# Patient Record
Sex: FEMALE | Race: WHITE | NOT HISPANIC OR LATINO | Employment: FULL TIME | URBAN - METROPOLITAN AREA
[De-identification: names, ages, dates, MRNs, and addresses within clinical notes are randomized per-mention and may not be internally consistent; named-entity substitution may affect disease eponyms.]

---

## 2017-08-01 ENCOUNTER — APPOINTMENT (OUTPATIENT)
Dept: LAB | Facility: MEDICAL CENTER | Age: 32
End: 2017-08-01
Payer: COMMERCIAL

## 2017-08-01 ENCOUNTER — LAB CONVERSION - ENCOUNTER (OUTPATIENT)
Dept: OTHER | Facility: OTHER | Age: 32
End: 2017-08-01

## 2017-08-01 ENCOUNTER — TRANSCRIBE ORDERS (OUTPATIENT)
Dept: ADMINISTRATIVE | Facility: HOSPITAL | Age: 32
End: 2017-08-01

## 2017-08-01 DIAGNOSIS — Z00.8 HEALTH EXAMINATION IN POPULATION SURVEYS: Primary | ICD-10-CM

## 2017-08-01 LAB
CHOLEST SERPL-MCNC: 170 MG/DL (ref 50–200)
EST. AVERAGE GLUCOSE BLD GHB EST-MCNC: 128 MG/DL
HBA1C MFR BLD: 6.1 % (ref 4.2–6.3)
HDLC SERPL-MCNC: 64 MG/DL (ref 40–60)
LDLC SERPL CALC-MCNC: 90 MG/DL (ref 0–100)
TRIGL SERPL-MCNC: 79 MG/DL

## 2017-08-01 PROCEDURE — 80061 LIPID PANEL: CPT | Performed by: PREVENTIVE MEDICINE

## 2017-08-01 PROCEDURE — 83036 HEMOGLOBIN GLYCOSYLATED A1C: CPT | Performed by: PREVENTIVE MEDICINE

## 2017-08-01 PROCEDURE — 36415 COLL VENOUS BLD VENIPUNCTURE: CPT | Performed by: PREVENTIVE MEDICINE

## 2017-11-06 ENCOUNTER — ALLSCRIPTS OFFICE VISIT (OUTPATIENT)
Dept: OTHER | Facility: OTHER | Age: 32
End: 2017-11-06

## 2017-11-06 ENCOUNTER — LAB REQUISITION (OUTPATIENT)
Dept: LAB | Facility: HOSPITAL | Age: 32
End: 2017-11-06
Payer: COMMERCIAL

## 2017-11-06 DIAGNOSIS — Z01.419 ENCOUNTER FOR GYNECOLOGICAL EXAMINATION WITHOUT ABNORMAL FINDING: ICD-10-CM

## 2017-11-06 DIAGNOSIS — Z11.51 ENCOUNTER FOR SCREENING FOR HUMAN PAPILLOMAVIRUS (HPV): ICD-10-CM

## 2017-11-06 PROCEDURE — 87624 HPV HI-RISK TYP POOLED RSLT: CPT | Performed by: PHYSICIAN ASSISTANT

## 2017-11-06 PROCEDURE — G0145 SCR C/V CYTO,THINLAYER,RESCR: HCPCS | Performed by: PHYSICIAN ASSISTANT

## 2017-11-07 NOTE — PROGRESS NOTES
Assessment  1  Encounter for gynecological examination without abnormal finding (V72 31) (Z01 419)   2  Encounter for birth control pills maintenance (V25 41) (Z30 41)    Plan  Encounter for gynecological examination without abnormal finding    · Tri-Sprintec 0 18/0 215/0 25 MG-35 MCG Oral Tablet; TAKE ONE TABLET EVERY  DAY   Rx By: Libby Dee; Dispense: 84 Days ; #:84 Tablet; Refill: 3;For: Encounter for gynecological examination without abnormal finding; LASHELL = N; Verified Transmission to [address]; Last Updated By: System, [employer]; 11/6/2017 9:07:50 AM  Encounter for gynecological examination without abnormal finding, Screening for HPV  (human papillomavirus)    · (1) THIN PREP PAP WITH IMAGING; Status: In Progress - Specimen/Data  Collected,Retrospective By Protocol Authorization;   Done: [id number]   Perform:[facility] In Office Collection; DGR:[id number]; Last Updated Norma Caceres; 11/6/2017 8:59:10 AM;Ordered;For:Encounter for gynecological examination without abnormal finding, Screening for HPV (human papillomavirus); Ordered By:Susy Botello;  Maturation index required? : No  : 11/2/17  HPV? : Regardless of Interpretation    Discussion/Summary  healthy adult female cervical cancer screening is needed every three years HPV and Pap Co-testing Done Today Testing was done today for declines  Annual exam and pap performed, will call if abnormalrx sent via EMR1 year for annual       Chief Complaint  Pt here for yearly doing well today  History of Present Illness  HPI: 33 y/o female here for annual GYN exam  Pt denies any changes in medical or surgical histories  MGM- dx and passed away from kidney cancer  Menses regular, pt on trisprintec, no problems  (+) sexually active with 1 partner  Pt engaged  complaints       GYN HM, Adult Female [facility] Leandra: The patient is being seen for a Annual Gyn Exam evaluation  The last health maintenance visit was 1 year(s) ago     General Health: The patient's health since the last visit is described as good  Lifestyle:  She does not have any weight concerns  -- She does not exercise regularly  -- She does not use tobacco  The patient is a former cigarette smoker  -- She consumes alcohol  She reports occasional alcohol use  -- She denies drug use  She has never used illicit drugs  Reproductive health: the patient is premenopausal--   she reports no menstrual problems  Menstrual history: LMP: the last menstrual period was 11/2/17  Recent menstrual periods: bleeding has been normal  The cycles have been regular  The duration of her recent periods has been regular  -- she uses contraception  For contraception, she uses oral contraception pills  -- she is sexually active  She is monogamous with a male partner  -- she denies prior pregnancies G     Screening: Cervical cancer screening includes a pap smear performed 10/22/14 Neg,-- human papilloma virus screening performed 10/22/14 Neg-- and-- no previous colposcopy  Breast cancer screening includes no previous mammogram  Colorectal cancer screening includes a colonoscopy performed 2 yrs  Review of Systems    Constitutional: no fever-- and-- no chills  Cardiovascular: no chest pain  Respiratory: no shortness of breath  Breasts: no breast pain-- and-- no breast lump  Gastrointestinal: no abdominal pain  Genitourinary: dysmenorrhea, but-- no dysuria,-- no pelvic pain,-- no vaginal discharge-- and-- no unexplained vaginal bleeding  Neurological: no headache  Active Problems  1  Birth Control Method - Oral Contraceptives   2  Cyst of left ovary (620 2) (N83 202)   3  Encounter for birth control pills maintenance (V25 41) (Z30 41)   4  Encounter for gynecological examination without abnormal finding (V72 31) (Z01 419)   5   Encounter for routine gynecological examination (V72 31) (Z01 419)    Past Medical History   · History of migraine (V12 49) (Z86 69)    The active problems and past medical history were reviewed and updated today  Surgical History   · Denied: History Of Prior Surgery    The surgical history was reviewed and updated today  Family History  Father    · No pertinent family history  Family History    · Family history of Denial Of Any Significant Medical History    The family history was reviewed and updated today  Social History   · Being A Social Drinker   · Birth Control Method - Oral Contraceptives   · Current Every Day Smoker (305 1)   · Current Smoker (305 1)   · Daily Coffee Consumption (1  Cups/Day)   · Exercising Erratically  The social history was reviewed and updated today  The social history was reviewed and is unchanged  Current Meds   1  Tri-Sprintec 0 18/0 215/0 25 MG-35 MCG Oral Tablet; TAKE 1 TABLET DAILY AS   DIRECTED; Therapy: [id number] to [phone]  Requested for: 48OHC5608; Last   Rx:03Nov2016 Ordered    Allergies  1  No Known Drug Allergies    Vitals   Recorded: 08MRD7073 74:98ZZ   Systolic 890, RUE, Sitting   Diastolic 82, RUE, Sitting   Height 5 ft 2 in   Weight 243 lb    BMI Calculated 44 45   BSA Calculated 2 08   LMP [id number]     Physical Exam    Constitutional   General appearance: No acute distress, well appearing and well nourished  Neck   Thyroid: Normal, no thyromegaly  Pulmonary   Respiratory effort: No increased work of breathing or signs of respiratory distress  Auscultation of lungs: Clear to auscultation  Cardiovascular   Auscultation of heart: Normal rate and rhythm, normal S1 and S2, no murmurs  Genitourinary   External genitalia: Normal and no lesions appreciated  Vagina: Normal, no lesions or dryness appreciated  Urethra: Normal     Urethral meatus: Normal     Bladder: Normal, soft, non-tender and no prolapse or masses appreciated  Cervix: Normal, no palpable masses  A Pap smear was performed  Uterus: Normal, non-tender, not enlarged, and no palpable masses      Adnexa/parametria: Normal, non-tender and no fullness or masses appreciated  Chest   Breasts: Normal and no dimpling or skin changes noted  Abdomen   Abdomen: Normal, non-tender, and no organomegaly noted      Psychiatric   Orientation to person, place, and time: Normal     Mood and affect: Normal        Signatures   Electronically signed by : Maxene Prader, PAC; Nov 6 2017  9:13AM EST                       (Author)    Electronically signed by : BETHANIE Guadalupe ; Nov 6 2017 11:42AM EST

## 2017-11-09 LAB — HPV RRNA GENITAL QL NAA+PROBE: NORMAL

## 2017-11-13 LAB
LAB AP GYN PRIMARY INTERPRETATION: NORMAL
Lab: NORMAL

## 2018-01-12 VITALS
WEIGHT: 243 LBS | SYSTOLIC BLOOD PRESSURE: 132 MMHG | HEIGHT: 62 IN | BODY MASS INDEX: 44.72 KG/M2 | DIASTOLIC BLOOD PRESSURE: 82 MMHG

## 2018-01-17 NOTE — MISCELLANEOUS
Message   Recorded as Task   Date: 10/27/2017 12:57 PM, Created By: Everett Torrez   Task Name: Med Renewal Request   Assigned To: Yahaira Fierro   Regarding Patient: Zafar Weaver, Status: Active   CommentAbdullahi Lay - 27 Oct 2017 12:57 PM     TASK CREATED  Caller: Self; [phone] (Home); [phone] (Work)  pt requested bc refill to [facility], 1 pck called in        Active Problems    1  Birth Control Method - Oral Contraceptives   2  Cyst of left ovary (620 2) (N83 202)   3  Encounter for birth control pills maintenance (V25 41) (Z30 41)   4  Encounter for gynecological examination without abnormal finding (V72 31) (Z01 419)   5  Encounter for routine gynecological examination (V72 31) (Z01 419)   6  Screening for HPV (human papillomavirus) (V73 81) (Z11 51)    Current Meds   1  Tri-Sprintec 0 18/0 215/0 25 MG-35 MCG Oral Tablet; take 1 tablet by mouth every day; Therapy: 05Slo3450 to (Evaluate:29Quv1063)  Requested for: 42LCX8699; Last   Rx:26Oct2015 Ordered   2  Tri-Sprintec 0 18/0 215/0 25 MG-35 MCG Oral Tablet; TAKE 1 TABLET DAILY AS   DIRECTED; Therapy: 09UIM1596 to [phone])  Requested for: 47GIQ2136; Last   Rx:65Jwh4007 Ordered    Allergies    1   No Known Drug Allergies    Signatures   Electronically signed by : Ani Carrillo, ; Oct 27 2017 12:57PM EST                       (Author)

## 2018-08-25 ENCOUNTER — TRANSCRIBE ORDERS (OUTPATIENT)
Dept: LAB | Facility: HOSPITAL | Age: 33
End: 2018-08-25

## 2018-08-25 ENCOUNTER — APPOINTMENT (OUTPATIENT)
Dept: LAB | Facility: HOSPITAL | Age: 33
End: 2018-08-25

## 2018-08-25 DIAGNOSIS — Z00.8 HEALTH EXAMINATION IN POPULATION SURVEY: Primary | ICD-10-CM

## 2018-08-25 DIAGNOSIS — Z00.8 HEALTH EXAMINATION IN POPULATION SURVEY: ICD-10-CM

## 2018-08-25 LAB
CHOLEST SERPL-MCNC: 172 MG/DL (ref 50–200)
EST. AVERAGE GLUCOSE BLD GHB EST-MCNC: 134 MG/DL
HBA1C MFR BLD: 6.3 % (ref 4.2–6.3)
HDLC SERPL-MCNC: 39 MG/DL (ref 40–60)
LDLC SERPL CALC-MCNC: 104 MG/DL (ref 0–100)
NONHDLC SERPL-MCNC: 133 MG/DL
TRIGL SERPL-MCNC: 144 MG/DL

## 2018-08-25 PROCEDURE — 80061 LIPID PANEL: CPT

## 2018-08-25 PROCEDURE — 83036 HEMOGLOBIN GLYCOSYLATED A1C: CPT

## 2018-08-25 PROCEDURE — 36415 COLL VENOUS BLD VENIPUNCTURE: CPT

## 2018-10-05 DIAGNOSIS — Z30.41 ORAL CONTRACEPTIVE PILL SURVEILLANCE: Primary | ICD-10-CM

## 2018-10-05 NOTE — TELEPHONE ENCOUNTER
pts yearly was resched by us from 11/12 to 11/26 ,  pls send 1 refill of bc trisprintec to [facility], thanks

## 2018-10-08 RX ORDER — NORGESTIMATE AND ETHINYL ESTRADIOL 7DAYSX3 28
1 KIT ORAL DAILY
Qty: 90 TABLET | Refills: 0 | Status: SHIPPED | OUTPATIENT
Start: 2018-10-08 | End: 2018-11-26 | Stop reason: SDUPTHER

## 2018-11-26 ENCOUNTER — ANNUAL EXAM (OUTPATIENT)
Dept: OBGYN CLINIC | Facility: MEDICAL CENTER | Age: 33
End: 2018-11-26
Payer: COMMERCIAL

## 2018-11-26 VITALS — DIASTOLIC BLOOD PRESSURE: 74 MMHG | SYSTOLIC BLOOD PRESSURE: 110 MMHG | WEIGHT: 190.8 LBS | BODY MASS INDEX: 34.9 KG/M2

## 2018-11-26 DIAGNOSIS — Z30.41 ORAL CONTRACEPTIVE PILL SURVEILLANCE: ICD-10-CM

## 2018-11-26 DIAGNOSIS — Z01.419 ENCOUNTER FOR GYNECOLOGICAL EXAMINATION (GENERAL) (ROUTINE) WITHOUT ABNORMAL FINDINGS: Primary | ICD-10-CM

## 2018-11-26 PROCEDURE — 99395 PREV VISIT EST AGE 18-39: CPT | Performed by: PHYSICIAN ASSISTANT

## 2018-11-26 RX ORDER — NORGESTIMATE AND ETHINYL ESTRADIOL 7DAYSX3 28
1 KIT ORAL DAILY
Qty: 84 TABLET | Refills: 3 | Status: SHIPPED | OUTPATIENT
Start: 2018-11-26 | End: 2019-12-03 | Stop reason: SDUPTHER

## 2018-11-26 NOTE — ASSESSMENT & PLAN NOTE
Annual exam performed, pt declined STD testing  OCP rx sent via EMR, rec taking placebo and seeing if headache improves, otherwise rec switching to monophasic and taking continuous OCP if withdrawal insertion  RTO 1 year

## 2018-11-26 NOTE — PROGRESS NOTES
Assessment/Plan  Problem List Items Addressed This Visit     Encounter for gynecological examination (general) (routine) without abnormal findings - Primary     Annual exam performed, pt declined STD testing  OCP rx sent via EMR, rec taking placebo and seeing if headache improves, otherwise rec switching to monophasic and taking continuous OCP if withdrawal insertion  RTO 1 year           Other Visit Diagnoses     Oral contraceptive pill surveillance        Relevant Medications    norgestimate-ethinyl estradiol (TRI-SPRINTEC) 0 18/0 215/0 25 MG-35 MCG per tablet        Bethany Argueta is a 28 y o  female who presents for annual GYN exam  She denies any changes in Medical, Surgical or Family histories  Periods are regular every 28-30 days, pt on trisprintec, gets headache in placebo week before menses  Dysmenorrhea:mild, occurring first 1-2 days of flow  She denies intermenstrual bleeding, spotting, or discharge  She reports that she is sexually active with 1 partner and using OCP (estrogen/progesterone) for contraception  Last Pap smear: 2017  Regular self breast exam: yes    Family history of uterine or ovarian cancer: no  Family history of breast cancer: no  Family history of colon cancer: no    Menstrual History:  OB History      Para Term  AB Living    0 0 0 0 0 0    SAB TAB Ectopic Multiple Live Births    0 0 0 0 0         Patient's last menstrual period was 2018 (exact date)    Period Pattern: Regular  Menstrual Flow: Moderate    The following portions of the patient's history were reviewed and updated as appropriate: allergies, current medications, past family history, past medical history, past social history, past surgical history and problem list     Review of Systems  Review of Systems     Objective   /74 (BP Location: Right arm)   Wt 86 5 kg (190 lb 12 8 oz)   LMP 2018 (Exact Date)   BMI 34 90 kg/m²     Physical Exam   Constitutional: She is oriented to person, place, and time  She appears well-developed and well-nourished  Neck: No thyromegaly present  Cardiovascular: Normal rate and regular rhythm  Pulmonary/Chest: Effort normal and breath sounds normal  Right breast exhibits no mass, no nipple discharge, no skin change and no tenderness  Left breast exhibits no mass, no nipple discharge, no skin change and no tenderness  Abdominal: Soft  There is no tenderness  There is no rebound and no guarding  Genitourinary: There is no rash or lesion on the right labia  There is no rash or lesion on the left labia  Uterus is not enlarged and not tender  Cervix exhibits no motion tenderness and no discharge  Right adnexum displays no mass and no tenderness  Left adnexum displays no mass and no tenderness  No bleeding in the vagina  No vaginal discharge found  Neurological: She is alert and oriented to person, place, and time  Psychiatric: She has a normal mood and affect  Nursing note and vitals reviewed

## 2019-10-19 ENCOUNTER — OFFICE VISIT (OUTPATIENT)
Dept: URGENT CARE | Facility: MEDICAL CENTER | Age: 34
End: 2019-10-19
Payer: COMMERCIAL

## 2019-10-19 VITALS
BODY MASS INDEX: 29.52 KG/M2 | HEIGHT: 62 IN | SYSTOLIC BLOOD PRESSURE: 110 MMHG | RESPIRATION RATE: 18 BRPM | DIASTOLIC BLOOD PRESSURE: 78 MMHG | HEART RATE: 94 BPM | OXYGEN SATURATION: 100 % | WEIGHT: 160.4 LBS | TEMPERATURE: 98.4 F

## 2019-10-19 DIAGNOSIS — J01.00 ACUTE MAXILLARY SINUSITIS, RECURRENCE NOT SPECIFIED: Primary | ICD-10-CM

## 2019-10-19 PROCEDURE — S9088 SERVICES PROVIDED IN URGENT: HCPCS | Performed by: PHYSICIAN ASSISTANT

## 2019-10-19 PROCEDURE — 99213 OFFICE O/P EST LOW 20 MIN: CPT | Performed by: PHYSICIAN ASSISTANT

## 2019-10-19 RX ORDER — AMOXICILLIN 500 MG/1
500 TABLET, FILM COATED ORAL 2 TIMES DAILY
Qty: 14 TABLET | Refills: 0 | Status: SHIPPED | OUTPATIENT
Start: 2019-10-19 | End: 2019-10-26

## 2019-10-19 NOTE — LETTER
October 19, 2019     Patient: Joanna Catalan   YOB: 1985   Date of Visit: 10/19/2019       To Whom it May Concern:    Joanna Catalan was seen in my clinic on 10/19/2019  If you have any questions or concerns, please don't hesitate to call           Sincerely,          Klarissa Little PA-C        CC: Joanna Marquesnatividad

## 2019-10-19 NOTE — PROGRESS NOTES
[facility]        NAME: Glenna Brown is a 27 y o  female  : 1985    MRN: [id number]  DATE: 2019  TIME: 11:16 AM    Assessment and Plan   Acute maxillary sinusitis, recurrence not specified [J01 00]  1  Acute maxillary sinusitis, recurrence not specified  amoxicillin (AMOXIL) 500 MG tablet         Patient Instructions       May continue OTC medications  Use antibiotic as directed   follow-up with PCP if symptoms do not improve    Chief Complaint     Chief Complaint   Patient presents with    Facial Pain     Pt  C/O sinus pressure and congerstion for the past two days  Reports low grade fevers  History of Present Illness        Patient is a 26-year-old female who presents today with complaints of sinus pressure, sinus congestion, sore throat, cough for the past 4 days  She reports low-grade fever of 99  She has used OTC Tylenol Sinus without any relief  Review of Systems   Review of Systems   Constitutional: Negative for fever  HENT: Positive for congestion, ear pain, sinus pressure, sinus pain and sore throat  Eyes: Negative for redness  Respiratory: Positive for cough  Negative for shortness of breath  Cardiovascular: Negative for chest pain  Current Medications       Current Outpatient Medications:     norgestimate-ethinyl estradiol (TRI-SPRINTEC) 0 18/0 215/0 25 MG-35 MCG per tablet, Take 1 tablet by mouth daily, Disp: 84 tablet, Rfl: 3    amoxicillin (AMOXIL) 500 MG tablet, Take 1 tablet (500 mg total) by mouth 2 (two) times a day for 7 days, Disp: 14 tablet, Rfl: 0    Current Allergies     Allergies as of 10/19/2019    (No Known Allergies)            The following portions of the patient's history were reviewed and updated as appropriate: allergies, current medications, past family history, past medical history, past social history, past surgical history and problem list      History reviewed  No pertinent past medical history      Past Surgical History:   Procedure Laterality Date    NO PAST SURGERIES         Family History   Problem Relation Age of Onset    No Known Problems Father     No Known Problems Family          Medications have been verified  Objective   /78   Pulse 94   Temp 98 4 °F (36 9 °C) (Temporal)   Resp 18   Ht 5' 2" (1 575 m)   Wt 72 8 kg (160 lb 6 4 oz)   SpO2 100%   BMI 29 34 kg/m²        Physical Exam     Physical Exam   Constitutional: She appears well-developed and well-nourished  HENT:   Head: Normocephalic and atraumatic  Right Ear: Tympanic membrane and ear canal normal    Left Ear: Tympanic membrane and ear canal normal    Nose: Mucosal edema present  Right sinus exhibits maxillary sinus tenderness  Left sinus exhibits maxillary sinus tenderness  Mouth/Throat: Uvula is midline and mucous membranes are normal  Posterior oropharyngeal erythema present  No oropharyngeal exudate or posterior oropharyngeal edema  Eyes: Pupils are equal, round, and reactive to light  Conjunctivae are normal    Neck: Neck supple  Cardiovascular: Normal rate and regular rhythm  Pulmonary/Chest: Effort normal and breath sounds normal    Lymphadenopathy:     She has no cervical adenopathy  Skin: Skin is warm and dry

## 2019-10-19 NOTE — PATIENT INSTRUCTIONS
May continue OTC medications  Use antibiotic as directed   follow-up with PCP if symptoms do not improve    Sinusitis   WHAT YOU NEED TO KNOW:   Sinusitis is inflammation or infection of your sinuses  It is most often caused by a virus  Acute sinusitis may last up to 12 weeks  Chronic sinusitis lasts longer than 12 weeks  Recurrent sinusitis means you have 4 or more times in 1 year  DISCHARGE INSTRUCTIONS:   Return to the emergency department if:   · Your eye and eyelid are red, swollen, and painful  · You cannot open your eye  · You have vision changes, such as double vision  · Your eyeball bulges out or you cannot move your eye  · You are more sleepy than normal, or you notice changes in your ability to think, move, or talk  · You have a stiff neck, a fever, or a bad headache  · You have swelling of your forehead or scalp  Contact your healthcare provider if:   · Your symptoms do not improve after 3 days  · Your symptoms do not go away after 10 days  · You have nausea and are vomiting  · Your nose is bleeding  · You have questions or concerns about your condition or care  Medicines: Your symptoms may go away on their own  Your healthcare provider may recommend watchful waiting for up to 10 days before starting antibiotics  You may  need any of the following:  · Acetaminophen  decreases pain and fever  It is available without a doctor's order  Ask how much to take and how often to take it  Follow directions  Read the labels of all other medicines you are using to see if they also contain acetaminophen, or ask your doctor or pharmacist  Acetaminophen can cause liver damage if not taken correctly  Do not use more than 4 grams (4,000 milligrams) total of acetaminophen in one day  · NSAIDs , such as ibuprofen, help decrease swelling, pain, and fever  This medicine is available with or without a doctor's order   NSAIDs can cause stomach bleeding or kidney problems in certain people  If you take blood thinner medicine, always ask your healthcare provider if NSAIDs are safe for you  Always read the medicine label and follow directions  · Nasal steroid sprays  may help decrease inflammation in your nose and sinuses  · Decongestants  help reduce swelling and drain mucus in the nose and sinuses  They may help you breathe easier  · Antihistamines  help dry mucus in the nose and relieve sneezing  · Antibiotics  help treat or prevent a bacterial infection  · Take your medicine as directed  Contact your healthcare provider if you think your medicine is not helping or if you have side effects  Tell him or her if you are allergic to any medicine  Keep a list of the medicines, vitamins, and herbs you take  Include the amounts, and when and why you take them  Bring the list or the pill bottles to follow-up visits  Carry your medicine list with you in case of an emergency  Self-care:   · Rinse your sinuses  Use a sinus rinse device to rinse your nasal passages with a saline (salt water) solution or distilled water  Do not use tap water  This will help thin the mucus in your nose and rinse away pollen and dirt  It will also help reduce swelling so you can breathe normally  Ask your healthcare provider how often to do this  · Breathe in steam   Heat a bowl of water until you see steam  Lean over the bowl and make a tent over your head with a large towel  Breathe deeply for about 20 minutes  Be careful not to get too close to the steam or burn yourself  Do this 3 times a day  You can also breathe deeply when you take a hot shower  · Sleep with your head elevated  Place an extra pillow under your head before you go to sleep to help your sinuses drain  · Drink liquids as directed  Ask your healthcare provider how much liquid to drink each day and which liquids are best for you  Liquids will thin the mucus in your nose and help it drain   Avoid drinks that contain alcohol or caffeine  · Do not smoke, and avoid secondhand smoke  Nicotine and other chemicals in cigarettes and cigars can make your symptoms worse  Ask your healthcare provider for information if you currently smoke and need help to quit  E-cigarettes or smokeless tobacco still contain nicotine  Talk to your healthcare provider before you use these products  Prevent the spread of germs that cause sinusitis:  Wash your hands often with soap and water  Wash your hands after you use the bathroom, change a child's diaper, or sneeze  Wash your hands before you prepare or eat food  Follow up with your healthcare provider as directed: You may be referred to an ear, nose, and throat specialist  Write down your questions so you remember to ask them during your visits  © 2017 [facility] Information is for End User's use only and may not be sold, redistributed or otherwise used for commercial purposes  All illustrations and images included in CareNotes® are the copyrighted property of A [employer]  or [employer]  The above information is an  only  It is not intended as medical advice for individual conditions or treatments  Talk to your doctor, nurse or pharmacist before following any medical regimen to see if it is safe and effective for you

## 2019-12-03 ENCOUNTER — ANNUAL EXAM (OUTPATIENT)
Dept: OBGYN CLINIC | Facility: MEDICAL CENTER | Age: 34
End: 2019-12-03
Payer: COMMERCIAL

## 2019-12-03 VITALS
DIASTOLIC BLOOD PRESSURE: 72 MMHG | SYSTOLIC BLOOD PRESSURE: 110 MMHG | HEIGHT: 62 IN | BODY MASS INDEX: 30.03 KG/M2 | WEIGHT: 163.2 LBS

## 2019-12-03 DIAGNOSIS — Z30.41 ORAL CONTRACEPTIVE PILL SURVEILLANCE: ICD-10-CM

## 2019-12-03 DIAGNOSIS — Z01.419 ENCOUNTER FOR GYNECOLOGICAL EXAMINATION (GENERAL) (ROUTINE) WITHOUT ABNORMAL FINDINGS: Primary | ICD-10-CM

## 2019-12-03 DIAGNOSIS — Z30.41 ENCOUNTER FOR SURVEILLANCE OF CONTRACEPTIVE PILLS: ICD-10-CM

## 2019-12-03 PROCEDURE — 99395 PREV VISIT EST AGE 18-39: CPT | Performed by: NURSE PRACTITIONER

## 2019-12-03 RX ORDER — NORGESTIMATE AND ETHINYL ESTRADIOL 7DAYSX3 28
1 KIT ORAL DAILY
Qty: 84 TABLET | Refills: 3 | Status: SHIPPED | OUTPATIENT
Start: 2019-12-03 | End: 2020-11-17 | Stop reason: SDUPTHER

## 2019-12-03 NOTE — PATIENT INSTRUCTIONS
Birth Control Pills   WHAT YOU NEED TO KNOW:   What are birth control pills? Birth control pills are also called oral contraceptives, or the pill  It is medicine that helps prevent pregnancy  Birth control pills work by preventing ovulation  Ovulation is when the ovaries make and release an egg cell each month  If this egg gets fertilized by sperm, pregnancy occurs  Birth control pills may also help to prevent pregnancy by keeping sperm from fertilizing an egg  What may be done before I can start taking birth control pills? You need to see your healthcare provider to get a prescription  Any of the following may be done before your healthcare provider gives you a prescription:  · Your healthcare provider will ask you about diseases and illnesses you have had in the past  He will check your risk for blood clots, heart conditions, or stroke  He will also check your blood pressure, and may do a breast and pelvic exam  A Pap smear may also be done during the pelvic exam  This is a test to make sure you do not have abnormal changes on your cervix  You may need other tests, such as a urine test, to make sure you are not pregnant  · Your healthcare provider will ask if you take any medicines and if you smoke  Smoking increases your risk for stroke, heart attack, or a blood clot in your lungs  If you smoke, you should not take certain kinds of birth control pills  What are the advantages of birth control pills? When birth control pills are used correctly, the chances of getting pregnant are very low  Birth control pills may help decrease bleeding and pain during your monthly period  They may also help prevent cancer of the uterus and ovaries  What are the disadvantages of birth control pills? You may have sudden changes in your mood or feelings while you take birth control pills  You may have nausea and decreased sex drive  You may have an increased appetite and rapid weight gain   You may also have bleeding in between periods, less frequent periods, vaginal dryness, and breast pain  Birth control pills will not protect you from sexually transmitted infections  Rarely, some birth control pills can increase your risk for a blood clot  This may become life-threatening  What should I do if I decide I want to get pregnant? If you are planning to have a baby, ask your healthcare provider when you may stop taking your birth control pills  It may take some time for you to start ovulating again  Ask your healthcare provider for more information about pregnancy after birth control pills  When should I start taking birth control pills after I have a baby? If you are not breastfeeding, you may start taking birth control pills 3 weeks after you give birth  You may be able to take certain types of birth control pills if you are breastfeeding  These pills can be started from 6 weeks to 6 months after you give birth  Ask your healthcare provider for more information about when to start taking birth control pills after you give birth  When should I contact my healthcare provider? · You have forgotten to take a birth control pill  · You have mood changes, such as depression, since starting birth control pills  · You have nausea or you are vomiting  · You have severe abdominal pain  · You missed a period and have questions or concerns about being pregnant  · You still have bleeding 4 months after taking birth control pills correctly  · You have questions or concerns about your condition or care  When should I seek immediate care or call 911? · Your arm or leg feels warm, tender, and painful  It may look swollen and red  · You feel lightheaded, short of breath, and have chest pain  · You cough up blood      · You have any of the following signs of a stroke:      ¨ Numbness or drooping on one side of your face     ¨ Weakness in an arm or leg    ¨ Confusion or difficulty speaking    ¨ Dizziness, a severe headache, or vision loss    · You have severe pain, numbness, or swelling in your arms or legs  CARE AGREEMENT:   You have the right to help plan your care  Learn about your health condition and how it may be treated  Discuss treatment options with your caregivers to decide what care you want to receive  You always have the right to refuse treatment  The above information is an  only  It is not intended as medical advice for individual conditions or treatments  Talk to your doctor, nurse or pharmacist before following any medical regimen to see if it is safe and effective for you  © 2017 [facility] Information is for End User's use only and may not be sold, redistributed or otherwise used for commercial purposes  All illustrations and images included in CareNotes® are the copyrighted property of A D A M , Inc  or Ted Bauer

## 2019-12-03 NOTE — ASSESSMENT & PLAN NOTE
Normal findings on routine annual gyn exam  Recommended monthly SBE, annual CBE  Reviewed ASCCP guidelines and pap noted to be up to date  STI testing was offered and declined at this time; the patient is aware that condoms are recommended for all sexual contact for prevention of STI  The patient likes OCP for contraception and desires to continue  Reviewed diet/activity recommendations and reasons to call  F/u in one year for routine annual gyn exam or sooner PRN

## 2019-12-03 NOTE — ASSESSMENT & PLAN NOTE
The patient likes current OCP  She denies ACHES and desires to continue  She is aware condoms are advised with all sexual contact for prevention of STI  Refill provided

## 2019-12-03 NOTE — PROGRESS NOTES
Assessment/Plan:    Encounter for gynecological examination (general) (routine) without abnormal findings  Normal findings on routine annual gyn exam  Recommended monthly SBE, annual CBE  Reviewed ASCCP guidelines and pap noted to be up to date  STI testing was offered and declined at this time; the patient is aware that condoms are recommended for all sexual contact for prevention of STI  The patient likes OCP for contraception and desires to continue  Reviewed diet/activity recommendations and reasons to call  F/u in one year for routine annual gyn exam or sooner PRN  Encounter for surveillance of contraceptive pills  The patient likes current OCP  She denies ACHES and desires to continue  She is aware condoms are advised with all sexual contact for prevention of STI  Refill provided  Diagnoses and all orders for this visit:    Encounter for gynecological examination (general) (routine) without abnormal findings    Encounter for surveillance of contraceptive pills    Oral contraceptive pill surveillance  -     norgestimate-ethinyl estradiol (TRI-SPRINTEC) 0 18/0 215/0 25 MG-35 MCG per tablet; Take 1 tablet by mouth daily      Levi Musa  1985      CC:  Yearly exam     S: Barbie Vasquez is a  27 y o  female here for yearly exam  She denies breast concerns, abdominal/pelvic pain, abnormal vaginal discharge or bladder/bowel dysfunction  Her cycles are regular, not heavy or painful  Sexual activity: She is sexually active without pain, bleeding or dryness  She had migraines on inactive OCP in past but states they are much improved when she starting taking placebos  Contraception: She uses RICHAR  for contraception  STD testing:  She does not want STD testing today  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        Last Pap/HPV 11/17 neg/neg  SBE monthly     Family hx of breast cancer: denies   Family hx of ovarian cancer: denies  Family hx of colon cancer: denies       Current Outpatient Medications:     norgestimate-ethinyl estradiol (TRI-SPRINTEC) 0 18/0 215/0 25 MG-35 MCG per tablet, Take 1 tablet by mouth daily, Disp: 84 tablet, Rfl: 3  Social History     Socioeconomic History    Marital status:      Spouse name: Not on file    Number of children: Not on file    Years of education: Not on file    Highest education level: Not on file   Occupational History    Not on file   Social Needs    Financial resource strain: Not on file    Food insecurity:     Worry: Not on file     Inability: Not on file    Transportation needs:     Medical: Not on file     Non-medical: Not on file   Tobacco Use    Smoking status: Former Smoker     Last attempt to quit: 2017     Years since quittin 9    Smokeless tobacco: Never Used   Substance and Sexual Activity    Alcohol use: Yes     Comment: rarely    Drug use: No    Sexual activity: Yes     Partners: Male     Birth control/protection: Pill   Lifestyle    Physical activity:     Days per week: Not on file     Minutes per session: Not on file    Stress: Not on file   Relationships    Social connections:     Talks on phone: Not on file     Gets together: Not on file     Attends [employer] service: Not on file     Active member of club or organization: Not on file     Attends meetings of clubs or organizations: Not on file     Relationship status: Not on file    Intimate partner violence:     Fear of current or ex partner: Not on file     Emotionally abused: Not on file     Physically abused: Not on file     Forced sexual activity: Not on file   Other Topics Concern    Not on file   Social History Narrative    Daily coffee consumption (1cup/day)    Exercises erratically      Family History   Problem Relation Age of Onset    Hypertension Father     Hyperlipidemia Father     Diabetes Father     No Known Problems Family     Hypertension Mother     Asthma Mother     Asthma Sister     Hypertension Sister     Hypertension Maternal Grandmother     Kidney cancer Maternal Grandmother     Prostate cancer Maternal Grandfather     Diabetes Paternal Grandmother     No Known Problems Paternal Grandfather       History reviewed  No pertinent past medical history  Review of Systems   Constitutional: Negative for appetite change, fatigue and unexpected weight change  Respiratory: Negative for shortness of breath  Cardiovascular: Negative for chest pain and leg swelling  Gastrointestinal: Negative for abdominal pain  Endocrine: Negative for cold intolerance and heat intolerance  Breasts:  Negative for tenderness, pain or masses  Genitourinary: Negative  Negative for dyspareunia, dysuria, flank pain, frequency, genital sores, hematuria, menstrual problem and pelvic pain  Musculoskeletal: Negative for back pain  Neurological: Negative for headaches  O:  Blood pressure 110/72, height 5' 2 25" (1 581 m), weight 74 kg (163 lb 3 2 oz), last menstrual period 11/20/2019  Patient appears well and is not in distress  Neck is supple without masses  Normal thyroid  Heart regular rate and rhythm  Lungs CTA bilaterally   Breasts are symmetrical without mass, tenderness, nipple discharge, skin changes or adenopathy  Fibrocystic changes noted   Abdomen is soft and nontender without masses  External genitals are normal without lesions or rashes  Urethral meatus and urethra are normal  Bladder is normal to palpation  Vagina is normal without discharge or bleeding  Cervix is normal without discharge or lesion  Uterus is normal, mobile, nontender without palpable mass  Adnexa are normal, nontender, without palpable mass

## 2020-11-17 DIAGNOSIS — Z30.41 ORAL CONTRACEPTIVE PILL SURVEILLANCE: ICD-10-CM

## 2020-11-18 ENCOUNTER — TELEPHONE (OUTPATIENT)
Dept: OBGYN CLINIC | Facility: CLINIC | Age: 35
End: 2020-11-18

## 2020-11-18 RX ORDER — NORGESTIMATE AND ETHINYL ESTRADIOL 7DAYSX3 28
1 KIT ORAL DAILY
Qty: 28 TABLET | Refills: 0 | Status: SHIPPED | OUTPATIENT
Start: 2020-11-18 | End: 2020-12-15 | Stop reason: SDUPTHER

## 2020-12-15 DIAGNOSIS — Z30.41 ORAL CONTRACEPTIVE PILL SURVEILLANCE: ICD-10-CM

## 2020-12-16 RX ORDER — NORGESTIMATE AND ETHINYL ESTRADIOL 7DAYSX3 28
1 KIT ORAL DAILY
Qty: 28 TABLET | Refills: 0 | Status: SHIPPED | OUTPATIENT
Start: 2020-12-16 | End: 2021-01-05 | Stop reason: SDUPTHER

## 2020-12-16 RX ORDER — NORGESTIMATE AND ETHINYL ESTRADIOL 7DAYSX3 28
KIT ORAL
Qty: 28 TABLET | Refills: 0 | OUTPATIENT
Start: 2020-12-16

## 2021-01-05 ENCOUNTER — TELEPHONE (OUTPATIENT)
Dept: OBGYN CLINIC | Facility: MEDICAL CENTER | Age: 36
End: 2021-01-05

## 2021-01-05 DIAGNOSIS — Z30.41 ORAL CONTRACEPTIVE PILL SURVEILLANCE: ICD-10-CM

## 2021-01-05 RX ORDER — NORGESTIMATE AND ETHINYL ESTRADIOL 7DAYSX3 28
1 KIT ORAL DAILY
Qty: 90 TABLET | Refills: 0 | Status: SHIPPED | OUTPATIENT
Start: 2021-01-05 | End: 2021-02-23 | Stop reason: SDUPTHER

## 2021-01-06 ENCOUNTER — OFFICE VISIT (OUTPATIENT)
Dept: URGENT CARE | Facility: MEDICAL CENTER | Age: 36
End: 2021-01-06
Payer: COMMERCIAL

## 2021-01-06 VITALS — HEART RATE: 123 BPM | TEMPERATURE: 97.8 F | OXYGEN SATURATION: 98 %

## 2021-01-06 DIAGNOSIS — R09.89 SYMPTOMS OF UPPER RESPIRATORY INFECTION (URI): Primary | ICD-10-CM

## 2021-01-06 PROCEDURE — U0003 INFECTIOUS AGENT DETECTION BY NUCLEIC ACID (DNA OR RNA); SEVERE ACUTE RESPIRATORY SYNDROME CORONAVIRUS 2 (SARS-COV-2) (CORONAVIRUS DISEASE [COVID-19]), AMPLIFIED PROBE TECHNIQUE, MAKING USE OF HIGH THROUGHPUT TECHNOLOGIES AS DESCRIBED BY CMS-2020-01-R: HCPCS | Performed by: PHYSICIAN ASSISTANT

## 2021-01-06 PROCEDURE — G0382 LEV 3 HOSP TYPE B ED VISIT: HCPCS | Performed by: PHYSICIAN ASSISTANT

## 2021-01-06 NOTE — LETTER
January 6, 2021     Patient: Trevon Terrell   YOB: 1985   Date of Visit: 1/6/2021       To Whom it May Concern:    Trevon Terrell was seen in my clinic on 1/6/2021  Please excuse from work until test results available  If you have any questions or concerns, please don't hesitate to call           Sincerely,          Kelley Arvizu PA-C        CC: Trevon Terrell

## 2021-01-06 NOTE — PROGRESS NOTES
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  Advised symptomatic treatment and to isolate until results available  Patient Instructions   Tylenol or Motrin for pain  May use OTC cough or congestion medications  Isolate until results available  Follow up with PCP in 3-5 days  Proceed to  ER if symptoms worsen  Chief Complaint     Chief Complaint   Patient presents with    Cold Like Symptoms     Started yesterday cough, coughing up green mucus and runny nose  Has been taking aireborne cold ez and robatussin  History of Present Illness       Patient is a 29-year-old female who comes in today with complaints of sore throat, cough, runny nose starting yesterday  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  She denies any shortness of breath or wheezing  No fevers, chills, body aches  Review of Systems   Review of Systems   Constitutional: Negative for chills, fatigue and fever  HENT: Positive for congestion, rhinorrhea and sore throat  Negative for ear pain  Respiratory: Positive for cough  Negative for shortness of breath and wheezing  Cardiovascular: Negative for chest pain  Musculoskeletal: Negative for myalgias           Current Medications       Current Outpatient Medications:     norgestimate-ethinyl estradiol (Tri-Sprintec) 0 18/0 215/0 25 MG-35 MCG per tablet, Take 1 tablet by mouth daily, Disp: 90 tablet, Rfl: 0    Current Allergies     Allergies as of 2021    (No Known Allergies)            The following portions of the patient's history were reviewed and updated as appropriate: allergies, current medications, past family history, past medical history, past social history, past surgical history and problem list      History reviewed  No pertinent past medical history  Past Surgical History:   Procedure Laterality Date    NO PAST SURGERIES         Family History   Problem Relation Age of Onset    Hypertension Father     Hyperlipidemia Father     Diabetes Father     No Known Problems Family     Hypertension Mother     Asthma Mother     Asthma Sister     Hypertension Sister     Hypertension Maternal Grandmother     Kidney cancer Maternal Grandmother     Prostate cancer Maternal Grandfather     Diabetes Paternal Grandmother     No Known Problems Paternal Grandfather          Medications have been verified  Objective   Pulse (!) 123   Temp 97 8 °F (36 6 °C)   SpO2 98%        Physical Exam     Physical Exam  Constitutional:       General: She is not in acute distress  Appearance: Normal appearance  She is not ill-appearing  HENT:      Head: Normocephalic and atraumatic  Right Ear: Tympanic membrane and ear canal normal       Left Ear: Tympanic membrane and ear canal normal       Nose: Rhinorrhea present  Mouth/Throat:      Mouth: Mucous membranes are moist       Pharynx: Oropharynx is clear  No posterior oropharyngeal erythema  Eyes:      Conjunctiva/sclera: Conjunctivae normal       Pupils: Pupils are equal, round, and reactive to light  Neck:      Musculoskeletal: Neck supple  Cardiovascular:      Rate and Rhythm: Regular rhythm  Tachycardia present  Pulmonary:      Effort: Pulmonary effort is normal       Breath sounds: Normal breath sounds  Lymphadenopathy:      Cervical: No cervical adenopathy  Skin:     General: Skin is warm and dry  Neurological:      Mental Status: She is alert

## 2021-01-07 LAB — SARS-COV-2 RNA SPEC QL NAA+PROBE: NOT DETECTED

## 2021-01-29 ENCOUNTER — IMMUNIZATIONS (OUTPATIENT)
Dept: FAMILY MEDICINE CLINIC | Facility: HOSPITAL | Age: 36
End: 2021-01-29

## 2021-01-29 DIAGNOSIS — Z23 ENCOUNTER FOR IMMUNIZATION: Primary | ICD-10-CM

## 2021-01-29 PROCEDURE — 0001A SARS-COV-2 / COVID-19 MRNA VACCINE (PFIZER-BIONTECH) 30 MCG: CPT

## 2021-01-29 PROCEDURE — 91300 SARS-COV-2 / COVID-19 MRNA VACCINE (PFIZER-BIONTECH) 30 MCG: CPT

## 2021-02-20 ENCOUNTER — IMMUNIZATIONS (OUTPATIENT)
Dept: FAMILY MEDICINE CLINIC | Facility: HOSPITAL | Age: 36
End: 2021-02-20

## 2021-02-20 DIAGNOSIS — Z23 ENCOUNTER FOR IMMUNIZATION: Primary | ICD-10-CM

## 2021-02-20 PROCEDURE — 91300 SARS-COV-2 / COVID-19 MRNA VACCINE (PFIZER-BIONTECH) 30 MCG: CPT

## 2021-02-20 PROCEDURE — 0002A SARS-COV-2 / COVID-19 MRNA VACCINE (PFIZER-BIONTECH) 30 MCG: CPT

## 2021-02-23 ENCOUNTER — ANNUAL EXAM (OUTPATIENT)
Dept: OBGYN CLINIC | Facility: CLINIC | Age: 36
End: 2021-02-23
Payer: COMMERCIAL

## 2021-02-23 VITALS — WEIGHT: 203.2 LBS | DIASTOLIC BLOOD PRESSURE: 70 MMHG | BODY MASS INDEX: 36.87 KG/M2 | SYSTOLIC BLOOD PRESSURE: 114 MMHG

## 2021-02-23 DIAGNOSIS — Z01.419 ENCOUNTER FOR GYNECOLOGICAL EXAMINATION (GENERAL) (ROUTINE) WITHOUT ABNORMAL FINDINGS: Primary | ICD-10-CM

## 2021-02-23 DIAGNOSIS — Z30.41 ORAL CONTRACEPTIVE PILL SURVEILLANCE: ICD-10-CM

## 2021-02-23 DIAGNOSIS — Z30.41 ENCOUNTER FOR SURVEILLANCE OF CONTRACEPTIVE PILLS: ICD-10-CM

## 2021-02-23 DIAGNOSIS — Z11.51 SCREENING FOR HPV (HUMAN PAPILLOMAVIRUS): ICD-10-CM

## 2021-02-23 PROCEDURE — 87624 HPV HI-RISK TYP POOLED RSLT: CPT | Performed by: NURSE PRACTITIONER

## 2021-02-23 PROCEDURE — G0145 SCR C/V CYTO,THINLAYER,RESCR: HCPCS | Performed by: NURSE PRACTITIONER

## 2021-02-23 PROCEDURE — 99395 PREV VISIT EST AGE 18-39: CPT | Performed by: NURSE PRACTITIONER

## 2021-02-23 RX ORDER — NORGESTIMATE AND ETHINYL ESTRADIOL 7DAYSX3 28
1 KIT ORAL DAILY
Qty: 90 TABLET | Refills: 3 | Status: SHIPPED | OUTPATIENT
Start: 2021-02-23 | End: 2022-05-16 | Stop reason: ALTCHOICE

## 2021-02-23 NOTE — ASSESSMENT & PLAN NOTE
Normal findings on routine gyn exam  Advised monthly SBE, annual CBE and baseline screening mammo at age 35  Reviewed ASCCP guidelines and recommended pap with cotesting q3 yrs for this low risk patient-collected today  STI testing was offered and the patient declines; she reports low risk  The patient desires to continue current contraceptive method-OCP  Diet/activity recommendations:  Encouraged daily Ca++ and vitamin D intake as well as daily weight bearing exercise for promotion of bone health  RTO in one year or sooner PRN

## 2021-02-23 NOTE — PROGRESS NOTES
Encounter for gynecological examination (general) (routine) without abnormal findings    Normal findings on routine gyn exam  Advised monthly SBE, annual CBE and baseline screening mammo at age 35  Reviewed ASCCP guidelines and recommended pap with cotesting q3 yrs for this low risk patient-collected today  STI testing was offered and the patient declines; she reports low risk  The patient desires to continue current contraceptive method-OCP  Diet/activity recommendations:  Encouraged daily Ca++ and vitamin D intake as well as daily weight bearing exercise for promotion of bone health  RTO in one year or sooner PRN  Encounter for surveillance of contraceptive pills    Happy with current OCP  Denies ACHES, breakthrough bleeding, nausea or other side effects  Refill given for one year  RTO 1 year or prn problems or concerns  Diagnoses and all orders for this visit:    Encounter for gynecological examination (general) (routine) without abnormal findings  -     Liquid-based pap, screening    Encounter for surveillance of contraceptive pills    Screening for HPV (human papillomavirus)  -     Liquid-based pap, screening    Oral contraceptive pill surveillance  -     norgestimate-ethinyl estradiol (Tri-Sprintec) 0 18/0 215/0 25 MG-35 MCG per tablet; Take 1 tablet by mouth daily         Jennifer Cyr  1985      CC:  Yearly exam     S:  Jennifer Cyr is a 38 y o  female here for yearly exam  She denies breast concerns, abdominal/pelvic pain, abnormal vaginal discharge or bladder/bowel dysfunction  Her cycles are regular, not heavy or painful on OCP    Sexual activity: She is rarely sexually active without pain, bleeding or dryness  Contraception: She uses OCP  for contraception  STD testing:  She does not want STD testing today         Last Pap 11/17 neg/neg   SBE sometimes       Family hx of breast cancer: denies  Family hx of ovarian cancer:denies  Family hx of colon cancer: denies    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       Current Outpatient Medications:     norgestimate-ethinyl estradiol (Tri-Sprintec) 0 18/0 215/0 25 MG-35 MCG per tablet, Take 1 tablet by mouth daily, Disp: 90 tablet, Rfl: 3  Social History     Socioeconomic History    Marital status:      Spouse name: Not on file    Number of children: Not on file    Years of education: Not on file    Highest education level: Not on file   Occupational History    Not on file   Social Needs    Financial resource strain: Not on file    Food insecurity     Worry: Not on file     Inability: Not on file    Transportation needs     Medical: Not on file     Non-medical: Not on file   Tobacco Use    Smoking status: Former Smoker     Quit date:      Years since quittin 1    Smokeless tobacco: Never Used   Substance and Sexual Activity    Alcohol use: Yes     Comment: rarely    Drug use: No    Sexual activity: Yes     Partners: Male     Birth control/protection: Pill   Lifestyle    Physical activity     Days per week: Not on file     Minutes per session: Not on file    Stress: Not on file   Relationships    Social connections     Talks on phone: Not on file     Gets together: Not on file     Attends [employer] service: Not on file     Active member of club or organization: Not on file     Attends meetings of clubs or organizations: Not on file     Relationship status: Not on file    Intimate partner violence     Fear of current or ex partner: Not on file     Emotionally abused: Not on file     Physically abused: Not on file     Forced sexual activity: Not on file   Other Topics Concern    Not on file   Social History Narrative    Daily coffee consumption (1cup/day)    Exercises erratically      Family History   Problem Relation Age of Onset    Hypertension Father     Hyperlipidemia Father     Diabetes Father     No Known Problems Family     Hypertension Mother     Asthma Mother     Asthma Sister    [facility] Hypertension Sister     Hypertension Maternal Grandmother     Kidney cancer Maternal Grandmother     Prostate cancer Maternal Grandfather     Diabetes Paternal Grandmother     No Known Problems Paternal Grandfather       History reviewed  No pertinent past medical history  Review of Systems   Constitutional: Negative for appetite change, fatigue and unexpected weight change  Respiratory: Negative for shortness of breath  Cardiovascular: Negative for chest pain and leg swelling  Gastrointestinal: Negative for abdominal pain  Endocrine: Negative for cold intolerance and heat intolerance  Breasts:  Negative for tenderness, pain or masses  Genitourinary: Negative for dyspareunia, dysuria, flank pain, frequency, genital sores, hematuria, menstrual problem and pelvic pain  Musculoskeletal: Negative for back pain  Neurological: Negative for headaches  O:  Blood pressure 114/70, weight 92 2 kg (203 lb 3 2 oz)  Patient appears well and is not in distress  ROM normal   Neck is supple without masses  Normal thyroid  Heart regular rate and rhythm  Lungs CTA bilaterally   Breasts are symmetrical without mass, tenderness, nipple discharge, skin changes or adenopathy  Fibrocystic changes noted bilaterally   Abdomen is soft and nontender without masses  External genitals are normal without lesions or rashes  Urethral meatus and urethra are normal  Bladder is normal to palpation  Vagina is normal without discharge or bleeding  Cervix is normal without discharge or lesion  + friability with pap  Uterus is normal, mobile, nontender without palpable mass  Adnexa are normal, nontender, without palpable mass     Skin warm and dry   Capillary refill < 2 seconds  Alert and oriented x 3 with normal affect

## 2021-02-26 LAB
HPV HR 12 DNA CVX QL NAA+PROBE: NEGATIVE
HPV16 DNA CVX QL NAA+PROBE: NEGATIVE
HPV18 DNA CVX QL NAA+PROBE: NEGATIVE
LAB AP GYN PRIMARY INTERPRETATION: NORMAL
Lab: NORMAL

## 2021-03-01 ENCOUNTER — TELEPHONE (OUTPATIENT)
Dept: OBGYN CLINIC | Facility: MEDICAL CENTER | Age: 36
End: 2021-03-01

## 2021-08-31 ENCOUNTER — APPOINTMENT (OUTPATIENT)
Dept: LAB | Facility: MEDICAL CENTER | Age: 36
End: 2021-08-31

## 2021-08-31 DIAGNOSIS — Z00.8 HEALTH EXAMINATION IN POPULATION SURVEY: ICD-10-CM

## 2021-08-31 LAB
CHOLEST SERPL-MCNC: 203 MG/DL (ref 50–200)
EST. AVERAGE GLUCOSE BLD GHB EST-MCNC: 117 MG/DL
HBA1C MFR BLD: 5.7 %
HDLC SERPL-MCNC: 42 MG/DL
LDLC SERPL CALC-MCNC: 134 MG/DL (ref 0–100)
NONHDLC SERPL-MCNC: 161 MG/DL
TRIGL SERPL-MCNC: 134 MG/DL

## 2021-08-31 PROCEDURE — 36415 COLL VENOUS BLD VENIPUNCTURE: CPT

## 2021-08-31 PROCEDURE — 83036 HEMOGLOBIN GLYCOSYLATED A1C: CPT

## 2021-08-31 PROCEDURE — 80061 LIPID PANEL: CPT

## 2022-05-16 ENCOUNTER — ANNUAL EXAM (OUTPATIENT)
Dept: OBGYN CLINIC | Facility: MEDICAL CENTER | Age: 37
End: 2022-05-16
Payer: COMMERCIAL

## 2022-05-16 VITALS
WEIGHT: 235.6 LBS | BODY MASS INDEX: 44.48 KG/M2 | DIASTOLIC BLOOD PRESSURE: 82 MMHG | HEIGHT: 61 IN | SYSTOLIC BLOOD PRESSURE: 140 MMHG

## 2022-05-16 DIAGNOSIS — Z01.419 ENCNTR FOR GYN EXAM (GENERAL) (ROUTINE) W/O ABN FINDINGS: Primary | ICD-10-CM

## 2022-05-16 PROBLEM — Z30.41 ENCOUNTER FOR SURVEILLANCE OF CONTRACEPTIVE PILLS: Status: RESOLVED | Noted: 2019-12-03 | Resolved: 2022-05-16

## 2022-05-16 PROCEDURE — S0612 ANNUAL GYNECOLOGICAL EXAMINA: HCPCS | Performed by: PHYSICIAN ASSISTANT

## 2022-05-16 RX ORDER — FLUTICASONE PROPIONATE 50 MCG
1 SPRAY, SUSPENSION (ML) NASAL DAILY
COMMUNITY

## 2022-05-16 NOTE — PROGRESS NOTES
Angel Holes  1985      CC:  Yearly exam    S:  39 y o  female here for yearly exam  Her cycles are regular, not heavy or crampy  Sexual activity: She is sexually active without pain, bleeding or dryness  Contraception: She uses nothing for contraception  She stopped her birth control pills in 2021  She got  this past month  They are trying for a pregnancy  They have not prevented pregnancy since prior to the wedding but were infrequently active at that time because they were busy planning the wedding  We reviewed the risks of AMA including aneuploidy, gHTN/preE, GDM  We discussed optimizing her health at this time including daily exercise, weight loss  She works on a med/surg/trauma floor at [employer]  Last Pap 21 neg/neg    We reviewed [facility] guidelines for Pap testing today       Family hx of breast cancer: no  Family hx of ovarian cancer: no  Family hx of colon cancer: no      Current Outpatient Medications:     fluticasone (FLONASE) 50 mcg/act nasal spray, 1 spray into each nostril daily, Disp: , Rfl:     loratadine-pseudoephedrine (CLARITIN-D 12-HOUR) 5-120 mg per tablet, Take 1 tablet by mouth in the morning and 1 tablet in the evening , Disp: , Rfl:   Social History     Socioeconomic History    Marital status:      Spouse name: Not on file    Number of children: Not on file    Years of education: Not on file    Highest education level: Not on file   Occupational History    Not on file   Tobacco Use    Smoking status: Former Smoker     Quit date:      Years since quittin 3    Smokeless tobacco: Never Used   Vaping Use    Vaping Use: Not on file   Substance and Sexual Activity    Alcohol use: Yes     Comment: rarely    Drug use: No    Sexual activity: Yes     Partners: Male     Birth control/protection: None   Other Topics Concern    Not on file   Social History Narrative    Daily coffee consumption (1cup/day)    Exercises erratically      Social Determinants of Health     Financial Resource Strain: Not on file   Food Insecurity: Not on file   Transportation Needs: Not on file   Physical Activity: Not on file   Stress: Not on file   Social Connections: Not on file   Intimate Partner Violence: Not on file   Housing Stability: Not on file     Family History   Problem Relation Age of Onset    Hypertension Father     Hyperlipidemia Father     Diabetes Father     No Known Problems Family     Hypertension Mother     Asthma Mother     Asthma Sister     Hypertension Sister     Hypertension Maternal Grandmother     Kidney cancer Maternal Grandmother     Prostate cancer Maternal Grandfather     Diabetes Paternal Grandmother     No Known Problems Paternal Grandfather       History reviewed  No pertinent past medical history  Review of Systems   Respiratory: Negative  Cardiovascular: Negative  Gastrointestinal: Negative for constipation and diarrhea  Genitourinary: Negative for difficulty urinating, pelvic pain, vaginal bleeding, vaginal discharge, itching or odor  O:  Blood pressure 140/82, height 5' 1 42" (1 56 m), weight 107 kg (235 lb 9 6 oz), last menstrual period 05/03/2022, currently breastfeeding  Patient appears well and is not in distress  Neck is supple without masses  Breasts are symmetrical without mass, tenderness, nipple discharge, skin changes or adenopathy  Abdomen is soft and nontender without masses  External genitals are normal without lesions or rashes  Urethral meatus and urethra are normal  Bladder is normal to palpation  Vagina is normal without discharge or bleeding  Cervix is normal without discharge or lesion  Uterus is normal, mobile, nontender without palpable mass  Adnexa are normal, nontender, without palpable mass  A:  Yearly exam      P:   Pap 2026   Prenatal vitamins   Call with +HPT     RTO one year for yearly exam or sooner as needed

## 2022-06-14 ENCOUNTER — TELEPHONE (OUTPATIENT)
Dept: OBGYN CLINIC | Facility: CLINIC | Age: 37
End: 2022-06-14

## 2022-06-14 NOTE — TELEPHONE ENCOUNTER
Pt not had cycle x 40 days - this is irregular for her     Neg at home UPT - states her and her  are trying to conceive  Wants to know what to do - ovs? bloodwork?

## 2022-06-14 NOTE — TELEPHONE ENCOUNTER
Spoke with pt  lmp 5/3  She will wait another week and take another hpt    States had a small amt of bloody disch  Constipated and trying to have a bm  tcb with any further questions or bleeding

## 2022-08-13 ENCOUNTER — APPOINTMENT (OUTPATIENT)
Dept: LAB | Facility: MEDICAL CENTER | Age: 37
End: 2022-08-13

## 2022-08-13 DIAGNOSIS — Z00.8 HEALTH EXAMINATION IN POPULATION SURVEYS: ICD-10-CM

## 2022-08-13 LAB
CHOLEST SERPL-MCNC: 152 MG/DL
EST. AVERAGE GLUCOSE BLD GHB EST-MCNC: 131 MG/DL
HBA1C MFR BLD: 6.2 %
HDLC SERPL-MCNC: 46 MG/DL
LDLC SERPL CALC-MCNC: 80 MG/DL (ref 0–100)
NONHDLC SERPL-MCNC: 106 MG/DL
TRIGL SERPL-MCNC: 128 MG/DL

## 2022-08-13 PROCEDURE — 80061 LIPID PANEL: CPT

## 2022-08-13 PROCEDURE — 36415 COLL VENOUS BLD VENIPUNCTURE: CPT

## 2022-08-13 PROCEDURE — 83036 HEMOGLOBIN GLYCOSYLATED A1C: CPT

## 2023-05-17 NOTE — PROGRESS NOTES
Assessment/Plan:  Pap with high risk HPV testing every 5 years, if normal  Due   Take daily prenatal vitamin with folic acid  Vitamin D 2000 IU by mouth daily  Ubiquinated Co Q 10 200 mg per day  Complete lab work on cycle day 3  Consider use of ovulator predictor kits to determine ovulation and when to have timed sex  Consider starting to have sex cycle day 10  Sexually transmitted infection testing as indicated  Declined  Exercise most days of week-minimum of 150 minutes per week  Obtain appropriate diet and hydration  Calcium 1000 mg (in divided doses-max 600 mg at one time) + 600 vit D daily  HPV 9 vaccine recommended through age 39  Check with your insurance for coverage  If covered, call office to schedule start of vaccine series  Not recommended while trying to conceive  Annual mammogram starting at age 36, monthly breast self exam recommended  Kegels 20 times twice daily  Return to office in one year or sooner, if needed  1  Encntr for gyn exam (general) (routine) w/o abn findings    2  Desire for pregnancy  -     Ambulatory Referral to Endocrinology; Future  -     Antimullerian hormone (AMH); Future  -     Follicle stimulating hormone; Future  -     Estradiol; Future    3  Irregular menses  -     Antimullerian hormone (AMH); Future  -     Follicle stimulating hormone; Future  -     Estradiol; Future    4  BMI 40 0-44 9, adult [facility])               Subjective:      Patient ID: Suni Sutton is a 44 y o  female  HPI    Suni Sutton is a 44 y o   female who is here today for her annual visit  No gynecologic health concerns  Menses Q 25-26 x 3 days with mod flow  Does have premenstrual spotting on and off x 8 months since trying to conceive  Menses is acceptable  Exercise- not currently  Currently on Weight Watchers and lost 30 + lbs in the last 8 months  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[facility]     Suni Sutton is sexually active with male "partner/  of 1 year  Together 10 years  Monogamous and feels safe in this relationship  Irregularly takes a prenatal vitamin  Denies vaginal pain,bleeding or dryness  She is not interested in STD screening today  She denies vaginal discharge, itching or pelvic pain  She has no  urinary concerns, does not have incontinence  No bowel concerns  No breast concerns  Last pap: 2/23/21 normal pap with negative HR HPV  DEXA scan: N/A  Mammogram: N/A   Colonoscopy: N/A  HPV vaccine series:No      The following portions of the patient's history were reviewed and updated as appropriate: allergies, current medications, past family history, past medical history, past social history, past surgical history and problem list     Review of Systems   Constitutional: Negative  Negative for activity change, appetite change, chills, diaphoresis, fatigue, fever and unexpected weight change  HENT: Negative for congestion, dental problem, sneezing, sore throat and trouble swallowing  Eyes: Negative for visual disturbance  Respiratory: Negative for chest tightness and shortness of breath  Cardiovascular: Negative for chest pain and leg swelling  Gastrointestinal: Negative for abdominal pain, constipation, diarrhea, nausea and vomiting  Genitourinary: Negative for difficulty urinating, dyspareunia, dysuria, frequency, hematuria, menstrual problem, pelvic pain, urgency, vaginal bleeding, vaginal discharge and vaginal pain  Musculoskeletal: Negative for back pain and neck pain  Skin: Negative  Allergic/Immunologic: Negative  Neurological: Positive for headaches (x years; lasting 15-20 minutes  No neurology follow up  )  Negative for weakness  Hematological: Negative for adenopathy  Psychiatric/Behavioral: Negative            Objective:      /86 (BP Location: Left arm, Patient Position: Sitting, Cuff Size: Large)   Ht 5' 1 5\" (1 562 m)   Wt 104 kg (229 lb)   LMP 05/05/2023 (Exact Date)  " BMI 42 57 kg/m²          Physical Exam  Vitals and nursing note reviewed  Constitutional:       Appearance: Normal appearance  She is well-developed  She is obese  HENT:      Head: Normocephalic and atraumatic  Eyes:      General:         Right eye: No discharge  Left eye: No discharge  Neck:      Thyroid: No thyromegaly  Trachea: Trachea normal    Cardiovascular:      Rate and Rhythm: Normal rate and regular rhythm  Heart sounds: Normal heart sounds  Pulmonary:      Effort: Pulmonary effort is normal       Breath sounds: Normal breath sounds  Chest:   Breasts:     Breasts are symmetrical       Right: Normal  No inverted nipple, mass, nipple discharge, skin change or tenderness  Left: Normal  No inverted nipple, mass, nipple discharge, skin change or tenderness  Abdominal:      Palpations: Abdomen is soft  Genitourinary:     General: Normal vulva  Exam position: Lithotomy position  Labia:         Right: No rash, tenderness, lesion or injury  Left: No rash, tenderness, lesion or injury  Urethra: No prolapse, urethral pain, urethral swelling or urethral lesion  Vagina: Normal  No signs of injury and foreign body  No vaginal discharge, erythema, tenderness or bleeding  Cervix: Normal       Uterus: Normal        Adnexa:         Right: No mass, tenderness or fullness  Left: No mass, tenderness or fullness  Rectum: No external hemorrhoid  Comments: Physical exam limited by habitus  Musculoskeletal:         General: Normal range of motion  Cervical back: Normal range of motion and neck supple  Lymphadenopathy:      Head:      Right side of head: No submental, submandibular or tonsillar adenopathy  Left side of head: No submental, submandibular or tonsillar adenopathy  Cervical: No cervical adenopathy  Upper Body:      Right upper body: No supraclavicular or axillary adenopathy        Left upper body: No supraclavicular or axillary adenopathy  Lower Body: No right inguinal adenopathy  No left inguinal adenopathy  Skin:     General: Skin is warm and dry  Neurological:      Mental Status: She is alert and oriented to person, place, and time     Psychiatric:         Mood and Affect: Mood normal          Behavior: Behavior normal

## 2023-05-18 ENCOUNTER — APPOINTMENT (OUTPATIENT)
Dept: LAB | Facility: MEDICAL CENTER | Age: 38
End: 2023-05-18

## 2023-05-18 ENCOUNTER — ANNUAL EXAM (OUTPATIENT)
Dept: OBGYN CLINIC | Facility: MEDICAL CENTER | Age: 38
End: 2023-05-18

## 2023-05-18 VITALS
WEIGHT: 229 LBS | DIASTOLIC BLOOD PRESSURE: 86 MMHG | HEIGHT: 62 IN | BODY MASS INDEX: 42.14 KG/M2 | SYSTOLIC BLOOD PRESSURE: 128 MMHG

## 2023-05-18 DIAGNOSIS — Z01.419 ENCNTR FOR GYN EXAM (GENERAL) (ROUTINE) W/O ABN FINDINGS: Primary | ICD-10-CM

## 2023-05-18 DIAGNOSIS — Z31.9 DESIRE FOR PREGNANCY: ICD-10-CM

## 2023-05-18 DIAGNOSIS — N92.6 IRREGULAR MENSES: ICD-10-CM

## 2023-05-18 NOTE — PATIENT INSTRUCTIONS
Pap with high risk HPV testing every 5 years, if normal  Due 2026  Take daily prenatal vitamin with folic acid  Vitamin D 2000 IU by mouth daily  Ubiquinated Co Q 10 200 mg per day  Complete lab work on cycle day 3  Consider use of ovulator predictor kits to determine ovulation and when to have timed sex  Consider starting to have sex cycle day 10  Sexually transmitted infection testing as indicated  Declined  Exercise most days of week-minimum of 150 minutes per week  Obtain appropriate diet and hydration  Calcium 1000 mg (in divided doses-max 600 mg at one time) + 600 vit D daily  HPV 9 vaccine recommended through age 39  Check with your insurance for coverage  If covered, call office to schedule start of vaccine series  Not recommended while trying to conceive  Annual mammogram starting at age 36, monthly breast self exam recommended  Perla 20 times twice daily  Return to office in one year or sooner, if needed

## 2023-06-02 ENCOUNTER — APPOINTMENT (OUTPATIENT)
Dept: LAB | Facility: CLINIC | Age: 38
End: 2023-06-02
Payer: COMMERCIAL

## 2023-06-02 DIAGNOSIS — Z31.9 DESIRE FOR PREGNANCY: ICD-10-CM

## 2023-06-02 DIAGNOSIS — N92.6 IRREGULAR MENSES: ICD-10-CM

## 2023-06-02 LAB
ESTRADIOL SERPL-MCNC: 55.1 PG/ML
FSH SERPL-ACNC: 8.5 MIU/ML

## 2023-06-02 PROCEDURE — 82670 ASSAY OF TOTAL ESTRADIOL: CPT

## 2023-06-02 PROCEDURE — 36415 COLL VENOUS BLD VENIPUNCTURE: CPT

## 2023-06-02 PROCEDURE — 82397 CHEMILUMINESCENT ASSAY: CPT

## 2023-06-02 PROCEDURE — 83001 ASSAY OF GONADOTROPIN (FSH): CPT

## 2023-06-10 LAB — MIS SERPL-MCNC: 0.27 NG/ML

## 2023-06-13 ENCOUNTER — TELEPHONE (OUTPATIENT)
Dept: OBGYN CLINIC | Facility: MEDICAL CENTER | Age: 38
End: 2023-06-13

## 2024-05-21 NOTE — PROGRESS NOTES
Assessment/Plan:  Repeat BP of 138/88. Will monitor.   Pap with high risk HPV testing every 5 years, if normal. Due 206  Sexually transmitted infection testing as indicated.Declined.  Exercise most days of week-minimum of 150-300 minutes per week.   Obtain appropriate diet and hydration.   Calcium 1000 mg (in divided doses-max 600 mg at one time) + 600 vit D daily.  HPV 9 vaccine recommended through age 45. Check with your insurance for coverage. If covered, call office to schedule start of vaccine series. Not recommended while trying to conceive.    Annual mammogram starting at age 40, monthly breast self exam recommended.   Call your insurance company to verify coverage prior to completing any ordered tests.   Return to office in one year or sooner, if needed.        1. Encntr for gyn exam (general) (routine) w/o abn findings  2. Encounter for screening mammogram for breast cancer  -     Mammo screening bilateral w 3d & cad; Future; Expected date: 06/15/2024  3. HPV vaccine counseling  4. BMI 40.0-44.9, adult ([facility])             Subjective:      Patient ID: Teri Mckinney is a 38 y.o. female.    HPI    Teri Mckinney is a 38 y.o.   female who is here today for her annual visit. No gynecologic health concerns.   /84. Symptomatic and not a typical BP.   She followed with Dr Budinetz. She had 2 unsuccessful egg retrieval. Now working on egg donation and a transfer in the next 2 months.   Menses  x 5 days with mod to heavy flow.  Menses is acceptable.  Exercise- 1-2 time per week.   Currently on Weight Watchers.   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     Teri Mckinney is sexually active with male partner/  of 2 year. Together 11 years.  Monogamous and feels safe in this relationship.  Takes a daily prenatal vitamin.  Denies vaginal pain,bleeding or dryness.    23 AMH 0.269, FSH of 8.5, estradiol of 55.10. referred to Dr Budinetz  She is not interested in STD screening today.  "  She denies vaginal discharge, itching or pelvic pain.   She has no  urinary concerns, does not have incontinence.  No bowel concerns.  No breast concerns.      Last pap: 02/23/2021 normal pap with negative HR HPV   DEXA scan: Not on file  Mammogram: Not on file   Colonoscopy: Not on file  HPV vaccine series: no    Family history of cancer:   Cancer-related family history includes Kidney cancer in her maternal grandmother; Prostate cancer in her maternal grandfather. There is no history of Breast cancer, Colon cancer, Ovarian cancer, Uterine cancer, or Cervical cancer.      The following portions of the patient's history were reviewed and updated as appropriate: allergies, current medications, past family history, past medical history, past social history, past surgical history, and problem list.    Review of Systems   Constitutional: Negative.  Negative for activity change, appetite change, chills, diaphoresis, fatigue, fever and unexpected weight change.   HENT:  Negative for congestion, dental problem, sneezing, sore throat and trouble swallowing.    Eyes:  Negative for visual disturbance.   Respiratory:  Negative for chest tightness and shortness of breath.    Cardiovascular:  Negative for chest pain and leg swelling.   Gastrointestinal:  Negative for abdominal pain, constipation, diarrhea, nausea and vomiting.   Genitourinary:  Negative for difficulty urinating, dyspareunia, dysuria, frequency, hematuria, menstrual problem, pelvic pain, urgency, vaginal bleeding, vaginal discharge and vaginal pain.   Musculoskeletal:  Negative for back pain and neck pain.   Skin: Negative.    Allergic/Immunologic: Negative.    Neurological:  Negative for weakness and headaches.   Hematological:  Negative for adenopathy.   Psychiatric/Behavioral: Negative.           Objective:      /84 (BP Location: Left arm, Patient Position: Sitting, Cuff Size: Standard)   Ht 5' 2.25\" (1.581 m)   Wt 100 kg (221 lb)   LMP 05/21/2024 " (Exact Date)   BMI 40.10 kg/m²          Physical Exam  Vitals and nursing note reviewed.   Constitutional:       Appearance: Normal appearance. She is well-developed.      Comments: BMI 40     HENT:      Head: Normocephalic and atraumatic.   Eyes:      General:         Right eye: No discharge.         Left eye: No discharge.   Neck:      Thyroid: No thyromegaly.      Trachea: Trachea normal.   Cardiovascular:      Rate and Rhythm: Normal rate and regular rhythm.      Heart sounds: Normal heart sounds.   Pulmonary:      Effort: Pulmonary effort is normal.      Breath sounds: Normal breath sounds.   Chest:   Breasts:     Breasts are symmetrical.      Right: Normal. No inverted nipple, mass, nipple discharge, skin change or tenderness.      Left: Normal. No inverted nipple, mass, nipple discharge, skin change or tenderness.   Abdominal:      Palpations: Abdomen is soft.   Genitourinary:     General: Normal vulva.      Exam position: Lithotomy position.      Labia:         Right: No rash, tenderness, lesion or injury.         Left: No rash, tenderness, lesion or injury.       Urethra: No prolapse, urethral pain, urethral swelling or urethral lesion.      Vagina: No signs of injury and foreign body. Bleeding (menses started today) present. No vaginal discharge, erythema or tenderness.      Cervix: Normal.      Uterus: Normal.       Adnexa:         Right: No mass, tenderness or fullness.          Left: No mass, tenderness or fullness.        Rectum: No external hemorrhoid.   Musculoskeletal:         General: Normal range of motion.      Cervical back: Normal range of motion and neck supple.   Lymphadenopathy:      Head:      Right side of head: No submental, submandibular or tonsillar adenopathy.      Left side of head: No submental, submandibular or tonsillar adenopathy.      Cervical: No cervical adenopathy.      Upper Body:      Right upper body: No supraclavicular or axillary adenopathy.      Left upper body: No  supraclavicular or axillary adenopathy.      Lower Body: No right inguinal adenopathy. No left inguinal adenopathy.   Skin:     General: Skin is warm and dry.   Neurological:      Mental Status: She is alert and oriented to person, place, and time.   Psychiatric:         Mood and Affect: Mood normal.         Behavior: Behavior normal.

## 2024-05-22 ENCOUNTER — ANNUAL EXAM (OUTPATIENT)
Dept: OBGYN CLINIC | Facility: MEDICAL CENTER | Age: 39
End: 2024-05-22
Payer: COMMERCIAL

## 2024-05-22 VITALS
WEIGHT: 221 LBS | DIASTOLIC BLOOD PRESSURE: 88 MMHG | HEIGHT: 62 IN | SYSTOLIC BLOOD PRESSURE: 138 MMHG | BODY MASS INDEX: 40.67 KG/M2

## 2024-05-22 DIAGNOSIS — Z71.85 HPV VACCINE COUNSELING: ICD-10-CM

## 2024-05-22 DIAGNOSIS — Z12.31 ENCOUNTER FOR SCREENING MAMMOGRAM FOR BREAST CANCER: ICD-10-CM

## 2024-05-22 DIAGNOSIS — Z01.419 ENCNTR FOR GYN EXAM (GENERAL) (ROUTINE) W/O ABN FINDINGS: Primary | ICD-10-CM

## 2024-05-22 PROCEDURE — S0612 ANNUAL GYNECOLOGICAL EXAMINA: HCPCS | Performed by: NURSE PRACTITIONER

## 2024-05-22 NOTE — PATIENT INSTRUCTIONS
Pap with high risk HPV testing every 5 years, if normal. Due 206  Sexually transmitted infection testing as indicated.Declined.  Exercise most days of week-minimum of 150-300 minutes per week.   Obtain appropriate diet and hydration.   Calcium 1000 mg (in divided doses-max 600 mg at one time) + 600 vit D daily.  HPV 9 vaccine recommended through age 45. Check with your insurance for coverage. If covered, call office to schedule start of vaccine series. Not recommended while trying to conceive.    Annual mammogram starting at age 40, monthly breast self exam recommended.   Call your insurance company to verify coverage prior to completing any ordered tests.   Return to office in one year or sooner, if needed.

## 2024-08-21 ENCOUNTER — APPOINTMENT (OUTPATIENT)
Dept: LAB | Facility: MEDICAL CENTER | Age: 39
End: 2024-08-21

## 2024-08-21 DIAGNOSIS — Z00.8 HEALTH EXAMINATION IN POPULATION SURVEYS: ICD-10-CM

## 2024-08-21 LAB
CHOLEST SERPL-MCNC: 176 MG/DL
HDLC SERPL-MCNC: 68 MG/DL
LDLC SERPL CALC-MCNC: 86 MG/DL (ref 0–100)
NONHDLC SERPL-MCNC: 108 MG/DL
TRIGL SERPL-MCNC: 110 MG/DL

## 2024-08-21 PROCEDURE — 36415 COLL VENOUS BLD VENIPUNCTURE: CPT

## 2024-08-21 PROCEDURE — 80061 LIPID PANEL: CPT

## 2024-08-21 PROCEDURE — 83036 HEMOGLOBIN GLYCOSYLATED A1C: CPT

## 2024-08-22 LAB
EST. AVERAGE GLUCOSE BLD GHB EST-MCNC: 120 MG/DL
HBA1C MFR BLD: 5.8 %

## 2024-08-31 ENCOUNTER — APPOINTMENT (OUTPATIENT)
Dept: LAB | Facility: MEDICAL CENTER | Age: 39
End: 2024-08-31
Payer: COMMERCIAL

## 2024-08-31 DIAGNOSIS — Z13.0 SCREENING FOR IRON DEFICIENCY ANEMIA: ICD-10-CM

## 2024-08-31 DIAGNOSIS — Z31.41 FERTILITY TESTING: ICD-10-CM

## 2024-08-31 DIAGNOSIS — Z11.3 SCREENING EXAMINATION FOR VENEREAL DISEASE: ICD-10-CM

## 2024-08-31 DIAGNOSIS — Z11.4 SCREENING FOR HUMAN IMMUNODEFICIENCY VIRUS: ICD-10-CM

## 2024-08-31 DIAGNOSIS — Z11.59 SCREENING FOR VIRAL DISEASE: ICD-10-CM

## 2024-08-31 LAB
BASOPHILS # BLD AUTO: 0.05 THOUSANDS/ÂΜL (ref 0–0.1)
BASOPHILS NFR BLD AUTO: 1 % (ref 0–1)
EOSINOPHIL # BLD AUTO: 0.11 THOUSAND/ÂΜL (ref 0–0.61)
EOSINOPHIL NFR BLD AUTO: 1 % (ref 0–6)
ERYTHROCYTE [DISTWIDTH] IN BLOOD BY AUTOMATED COUNT: 13.3 % (ref 11.6–15.1)
HBV SURFACE AG SER QL: NORMAL
HCT VFR BLD AUTO: 39.6 % (ref 34.8–46.1)
HCV AB SER QL: NORMAL
HGB BLD-MCNC: 12.4 G/DL (ref 11.5–15.4)
HIV 1+2 AB+HIV1 P24 AG SERPL QL IA: NORMAL
HIV 2 AB SERPL QL IA: NORMAL
HIV1 AB SERPL QL IA: NORMAL
HIV1 P24 AG SERPL QL IA: NORMAL
IMM GRANULOCYTES # BLD AUTO: 0.09 THOUSAND/UL (ref 0–0.2)
IMM GRANULOCYTES NFR BLD AUTO: 1 % (ref 0–2)
LYMPHOCYTES # BLD AUTO: 2.88 THOUSANDS/ÂΜL (ref 0.6–4.47)
LYMPHOCYTES NFR BLD AUTO: 27 % (ref 14–44)
MCH RBC QN AUTO: 29.2 PG (ref 26.8–34.3)
MCHC RBC AUTO-ENTMCNC: 31.3 G/DL (ref 31.4–37.4)
MCV RBC AUTO: 93 FL (ref 82–98)
MONOCYTES # BLD AUTO: 0.55 THOUSAND/ÂΜL (ref 0.17–1.22)
MONOCYTES NFR BLD AUTO: 5 % (ref 4–12)
NEUTROPHILS # BLD AUTO: 6.96 THOUSANDS/ÂΜL (ref 1.85–7.62)
NEUTS SEG NFR BLD AUTO: 65 % (ref 43–75)
NRBC BLD AUTO-RTO: 0 /100 WBCS
PLATELET # BLD AUTO: 384 THOUSANDS/UL (ref 149–390)
PMV BLD AUTO: 10.3 FL (ref 8.9–12.7)
RBC # BLD AUTO: 4.24 MILLION/UL (ref 3.81–5.12)
TREPONEMA PALLIDUM IGG+IGM AB [PRESENCE] IN SERUM OR PLASMA BY IMMUNOASSAY: NORMAL
TSH SERPL DL<=0.05 MIU/L-ACNC: 2.4 UIU/ML (ref 0.45–4.5)
WBC # BLD AUTO: 10.64 THOUSAND/UL (ref 4.31–10.16)

## 2024-08-31 PROCEDURE — 87340 HEPATITIS B SURFACE AG IA: CPT

## 2024-08-31 PROCEDURE — 84443 ASSAY THYROID STIM HORMONE: CPT

## 2024-08-31 PROCEDURE — 85025 COMPLETE CBC W/AUTO DIFF WBC: CPT

## 2024-08-31 PROCEDURE — 87389 HIV-1 AG W/HIV-1&-2 AB AG IA: CPT

## 2024-08-31 PROCEDURE — 36415 COLL VENOUS BLD VENIPUNCTURE: CPT

## 2024-08-31 PROCEDURE — 86803 HEPATITIS C AB TEST: CPT

## 2024-08-31 PROCEDURE — 86780 TREPONEMA PALLIDUM: CPT

## 2024-10-08 ENCOUNTER — TELEPHONE (OUTPATIENT)
Age: 39
End: 2024-10-08

## 2024-10-08 NOTE — TELEPHONE ENCOUNTER
Patient called, advised they are being released by [facility] as of 10/25 & recommended to schedule first ob appt week of 11/4. Pt is established, attempted warm transfer to schedule ob intake. Please reach out to pt to schedule ([place] office preferred)

## 2024-11-07 ENCOUNTER — INITIAL PRENATAL (OUTPATIENT)
Dept: OBGYN CLINIC | Facility: CLINIC | Age: 39
End: 2024-11-07

## 2024-11-07 DIAGNOSIS — Z31.430 ENCOUNTER OF FEMALE FOR TESTING FOR GENETIC DISEASE CARRIER STATUS FOR PROCREATIVE MANAGEMENT: ICD-10-CM

## 2024-11-07 DIAGNOSIS — Z34.01 ENCOUNTER FOR SUPERVISION OF NORMAL FIRST PREGNANCY IN FIRST TRIMESTER: Primary | ICD-10-CM

## 2024-11-07 DIAGNOSIS — Z36.9 ENCOUNTER FOR ANTENATAL SCREENING: ICD-10-CM

## 2024-11-07 PROCEDURE — OBC

## 2024-11-07 RX ORDER — DOCUSATE SODIUM 100 MG/1
100 CAPSULE, LIQUID FILLED ORAL 2 TIMES DAILY
COMMUNITY

## 2024-11-07 NOTE — PATIENT INSTRUCTIONS
Congratulations!! Please review our Pregnancy Essential Guide and [facility] L&D Virtual tour from our networks website.     [facility] Pregnancy Essentials Guide  [facility] ([email or web address])     [facility] - Virtual Tour ([employer])     Wanda Mckinney,     It was so nice getting to know you today. Remember if you have an urgent or time sensitive concern, please call the practice phone number so a clinical triage team member can review your symptoms and get in touch with our on call provider if necessary. If you have general questions or need help navigating our services please REPLY to this message so it comes directly to me and I will respond between other patients. If I am out of the office for any reason, another nurse navigator may reach out to help you. Our Pregnancy Essential Guide is a great online resource--please use the link below.     [facility] Pregnancy Essentials Guide  [facility] ([email or web address])     Again, Congratulations and Thank You for choosing [facility]. I look forward to helping you through this journey. Reach out- [phone] RN

## 2024-11-07 NOTE — PROGRESS NOTES
OB INTAKE INTERVIEW  Patient is 39 y.o.y.o. who presents for OB intake at 11-0 wks  She is accompanied by  during this encounter  The father of her baby (Shell) is involved in the pregnancy and they are .       Last Menstrual Period: 24  Ultrasound: Measured 6 weeks 1 days on 10/4/24  Estimated Date of Delivery: 25 via embryo Transfer date of 9/10/24     Signs/Symptoms of Pregnancy  Current pregnancy symptoms: fatigue, heartburn, intermittent nausea  Constipation YES  Headaches no  Cramping/spotting no  PICA cravings no    Diabetes-    If patient has 1 or more, please order early 1 hour GTT  History of GDM no  BMI >35 YES  History of PCOS or current metformin use no  History of LGA/macrosomic infant (4000g/9lbs) no    If patient has 2 or more, please order early 1 hour GTT  BMI>30 YES  AMA YES  First degree relative with type 2 diabetes YES  History of chronic HTN, hyperlipidemia, elevated A1C no  High risk race (, , ,  or ) no    Hypertension- if you answer yes to any of the following, please order baseline preeclampsia labs (cbc, comprehensive metabolic panel, urine protein creatinine ratio, uric acid)  History of of chronic HTN no  History of gestational HTN no  History of preeclampsia, eclampsia, or HELLP syndrome no  History of diabetes no  History of lupus, autoimmune disease, kidney disease no    Thyroid- if yes order TSH with reflex T4  History of thyroid disease no    Bleeding Disorder or Hx of DVT-patient or first degree relative with history of. Order the following if not done previously.   (Factor V, antithrombin III, prothrombin gene mutation, protein C and S Ag, lupus anticoagulant, anticardiolipin, beta-2 glycoprotein)   no    OB/GYN-  History of abnormal pap smear no       Date of last pap smear 2021 wnl  History of HPV no  History of Herpes/HSV no  History of other STI (gonorrhea, chlamydia, trich)  no  History of prior  no  History of prior  no  History of  delivery prior to 36 weeks 6 days no  History of blood transfusion no  Ok for blood transfusion yes    Substance screening- if yes outside of tobacco for her or anyone in her home-order urine drug screen  History of tobacco use YES  Currently using tobacco no  Currently using alcohol no  Presently using drugs no  Past drug use  no  IV drug use- no  Partner drug use no  Parent/Family drug use no    MRSA Screening-   Does the pt have a hx of MRSA? no    Immunizations:  Influenza vaccine given this season no  Discussed Tdap vaccine yes  Discussed COVID Vaccine yes    Genetic/MFM-  Do you or your partner have a history of any of the following in yourselves or first degree relatives?  Cystic fibrosis no  Spinal muscular atrophy no  Hemoglobinopathy/Sickle Cell/Thalassemia no  Fragile X Intellectual Disability no    If yes, discuss Carrier Screening and recommend consultation with MFM/Genetic Counseling and place specific [facility] Referral for.    If no, discuss Carrier Screening being completed once in a lifetime as a standard of care lab test. Place orders for Cystic Fibrosis Gene Test (EIJ883) and Spinal Muscular Atrophy DNA ([id number])      Appointment for Nuchal Translucency Ultrasound at [facility] scheduled for referral placed - pt to scheduled today.     Interview education  [facility] Pregnancy Essentials Book reviewed, discussed and attached to their AVS yes    Nurse/Family Partnership- patient may qualify yes; referral placed yes    Prenatal lab work scripts yes  Extra labs ordered:  One hour glucola, CF & SMA    Aspirin/Preeclampsia Screen    Risk Level Risk Factor Recommendation   LOW Prior Uncomplicated full-term delivery no No Aspirin recommendation        MODERATE Nulliparity YES Recommend low-dose aspirin if     BMI>30 YES 2 or more moderate risk factors    Family History Preeclampsia (mother/sister) YES     35yr old or greater YES       or Low Socioeconomic no     IVF Pregnancy  YES     Personal History Risks (low birth weight, prior adverse preg outcome, >10yr preg interval) no         HIGH History of Preeclampsia no Recommend low-dose aspirin if     Multifetal gestation no 1 or more high risk factors    Chronic HTN no     Type 1 or 2 Diabetes no     Renal Disease no     Autoimmune Disease  no      Contraindications to ASA therapy:  NSAID/ ASA allergy: no  Nasal polyps: no  Asthma with history of ASA induced bronchospasm: no  Relative contraindications:  History of GI bleed: no  Active peptic ulcer disease: no  Severe hepatic dysfunction: no    Patient should be recommended to take ASA 162mg during this pregnancy from 12-36wks to lower her risk of preeclampsia: yes- advised pt to take 2 LDASA daily starting at 12wks-until 36 wks of pregnancy.      The patient has a history now or in prior pregnancy notable for: , Pt of [facility] - Embryo Transfer 9/10/24.- Donor Egg,    AMA,  0+,  BMI 40.1,          Details that I feel the provider should be aware of: Pt Fwftspd-Ahystg-Ilrnys- dermal dysplasia;  FOB negative.  Pt Requested CF & SMA testing- ordered in epic.       PN1 visit scheduled. The patient was oriented to our practice, reviewed delivering physicians and [facility] for Delivery. All questions were answered. PN in-person interview completed. Pt accompanied by Shell WONG.  [facility]. - Donor Egg, Embryo Transfer.  Most PN labs completed- remainder ordered at this visit.  Pt advised to await authorization phone call prior to having bldwk drawn.  PNC referral entered in epic.  PN1 visit to be scheduled.  Reviewed blue folder, & Nurse Navigator role.  EPDS completed- score=5.   Advised pt to call with any further questions/concerns.       Interviewed by: Mi Ayala RN

## 2024-11-18 ENCOUNTER — ROUTINE PRENATAL (OUTPATIENT)
Dept: PERINATAL CARE | Facility: OTHER | Age: 39
End: 2024-11-18
Payer: COMMERCIAL

## 2024-11-18 ENCOUNTER — INITIAL PRENATAL (OUTPATIENT)
Dept: OBGYN CLINIC | Facility: MEDICAL CENTER | Age: 39
End: 2024-11-18
Payer: COMMERCIAL

## 2024-11-18 VITALS
BODY MASS INDEX: 46.38 KG/M2 | SYSTOLIC BLOOD PRESSURE: 138 MMHG | HEIGHT: 62 IN | WEIGHT: 252 LBS | HEART RATE: 90 BPM | DIASTOLIC BLOOD PRESSURE: 62 MMHG

## 2024-11-18 VITALS
WEIGHT: 252.2 LBS | HEART RATE: 114 BPM | DIASTOLIC BLOOD PRESSURE: 70 MMHG | BODY MASS INDEX: 46.41 KG/M2 | HEIGHT: 62 IN | SYSTOLIC BLOOD PRESSURE: 122 MMHG

## 2024-11-18 DIAGNOSIS — Z86.59 HISTORY OF DEPRESSION: ICD-10-CM

## 2024-11-18 DIAGNOSIS — O09.811 PREGNANCY RESULTING FROM IN VITRO FERTILIZATION IN FIRST TRIMESTER: ICD-10-CM

## 2024-11-18 DIAGNOSIS — Z3A.12 12 WEEKS GESTATION OF PREGNANCY: ICD-10-CM

## 2024-11-18 DIAGNOSIS — Z34.01 PRENATAL CARE, FIRST PREGNANCY IN FIRST TRIMESTER: Primary | ICD-10-CM

## 2024-11-18 DIAGNOSIS — O09.521 MULTIGRAVIDA OF ADVANCED MATERNAL AGE IN FIRST TRIMESTER: ICD-10-CM

## 2024-11-18 DIAGNOSIS — Z3A.12 12 WEEKS GESTATION OF PREGNANCY: Primary | ICD-10-CM

## 2024-11-18 DIAGNOSIS — O99.210 OBESITY DURING PREGNANCY: ICD-10-CM

## 2024-11-18 DIAGNOSIS — Z36.82 NUCHAL TRANSLUCENCY OF FETUS ON PRENATAL ULTRASOUND: ICD-10-CM

## 2024-11-18 DIAGNOSIS — Z13.79 GENETIC SCREENING: ICD-10-CM

## 2024-11-18 DIAGNOSIS — Z34.01 ENCOUNTER FOR SUPERVISION OF NORMAL FIRST PREGNANCY IN FIRST TRIMESTER: ICD-10-CM

## 2024-11-18 DIAGNOSIS — O09.511 PRIMIGRAVIDA OF ADVANCED MATERNAL AGE IN FIRST TRIMESTER: ICD-10-CM

## 2024-11-18 DIAGNOSIS — E66.01 OBESITY, CLASS III, BMI 40-49.9 (MORBID OBESITY) (HCC): ICD-10-CM

## 2024-11-18 DIAGNOSIS — Z11.3 SCREEN FOR STD (SEXUALLY TRANSMITTED DISEASE): ICD-10-CM

## 2024-11-18 LAB
SL AMB  POCT GLUCOSE, UA: NORMAL
SL AMB POCT URINE PROTEIN: NORMAL

## 2024-11-18 PROCEDURE — 76814 OB US NUCHAL MEAS ADD-ON: CPT | Performed by: OBSTETRICS & GYNECOLOGY

## 2024-11-18 PROCEDURE — 81002 URINALYSIS NONAUTO W/O SCOPE: CPT | Performed by: STUDENT IN AN ORGANIZED HEALTH CARE EDUCATION/TRAINING PROGRAM

## 2024-11-18 PROCEDURE — 76813 OB US NUCHAL MEAS 1 GEST: CPT | Performed by: OBSTETRICS & GYNECOLOGY

## 2024-11-18 PROCEDURE — 87591 N.GONORRHOEAE DNA AMP PROB: CPT | Performed by: STUDENT IN AN ORGANIZED HEALTH CARE EDUCATION/TRAINING PROGRAM

## 2024-11-18 PROCEDURE — 76801 OB US < 14 WKS SINGLE FETUS: CPT | Performed by: OBSTETRICS & GYNECOLOGY

## 2024-11-18 PROCEDURE — 99244 OFF/OP CNSLTJ NEW/EST MOD 40: CPT | Performed by: OBSTETRICS & GYNECOLOGY

## 2024-11-18 PROCEDURE — 87491 CHLMYD TRACH DNA AMP PROBE: CPT | Performed by: STUDENT IN AN ORGANIZED HEALTH CARE EDUCATION/TRAINING PROGRAM

## 2024-11-18 PROCEDURE — PNV: Performed by: STUDENT IN AN ORGANIZED HEALTH CARE EDUCATION/TRAINING PROGRAM

## 2024-11-18 RX ORDER — ASPIRIN 81 MG/1
162 TABLET ORAL DAILY
COMMUNITY

## 2024-11-18 NOTE — PROGRESS NOTES
12 weeks gestation of pregnancy  40yo  at 12.4wks by embryo transfer date at [facility], report in media, JOSE RAUL 25, presents for initial prenatal visit     Pt denies contractions, vaginal bleeding, leakage of fluid.    -continue PNVs   -prenatal labs reviewed in media, has appointment to complete remaining labs this week   -s/p nuchal this AM, report pending   -GCCT collected today   - labor/bleeding precautions provided   -return in 4 weeks     Obesity during pregnancy  -1 hr GTT ordered, pt encouraged to complete   -healthy lifestyle habits and appropriate weight gain discussed   -continue ASA     Multigravida of advanced maternal age in first trimester  -continue ASA as above    Pregnancy resulting from in vitro fertilization in first trimester  -s/p embryo transfer with DONOR egg on 9/10/24, JOSE RAUL 9/10/24  -genetic testing not performed on embryo due to fresh embryo transfer  -plan for fetal echo

## 2024-11-18 NOTE — PROGRESS NOTES
Patient chose to have [facility] [id number] Non-Invasive Prenatal Screen [id number] PLUS w/ SCA, WITH fetal sex.  Patient choose to be billed through insurance.     Patient given brochure and is aware [facility] will contact patient's insurance and coordinate coverage.  Provided [facility] contact information. General inquiries [phone], Cost estimates [phone] and [facility] Billing [phone]. Website [email or web address].     Blood collection tubes labeled with patient identifiers (name, medical record number, and date of birth).     Filled out [facility] order form. Patient chose to be sent to an outpatient lab to complete blood work. .      If patient chose to have blood work drawn at a [facility] lab we requested patient notify MFM (via phone call or L8 SmartLight message) when blood collected so office can follow up on results.       Maternal Fetal Medicine will have results in approximately 5-7 business days and will call patient or notify via [employer] SmartLight.  Patient aware viewing lab result online will reveal fetal sex if ordered.    Patient verbalized understanding of all instructions and no questions at this time.

## 2024-11-18 NOTE — ASSESSMENT & PLAN NOTE
-s/p embryo transfer with DONOR egg on 9/10/24, JOSE RAUL 9/10/24  -genetic testing not performed on embryo due to fresh embryo transfer  -plan for fetal echo

## 2024-11-18 NOTE — ASSESSMENT & PLAN NOTE
40yo  at 12.4wks by embryo transfer date at [facility], report in media, JOSE RAUL 25, presents for initial prenatal visit     Pt denies contractions, vaginal bleeding, leakage of fluid.    -continue PNVs   -prenatal labs reviewed in media, has appointment to complete remaining labs this week   -s/p nuchal this AM, report pending   -GCCT collected today   - labor/bleeding precautions provided   -return in 4 weeks

## 2024-11-18 NOTE — LETTER
November 20, 2024     Devika Malone DO  [address] [facility] [address]    Patient: Teri Mckinney   YOB: 1985   Date of Visit: 11/18/2024       Dear Dr. Malone:    Thank you for referring Teri Mckinney to me for evaluation. Below are my notes for this consultation.    If you have questions, please do not hesitate to call me. I look forward to following your patient along with you.         Sincerely,        Isak Chakraborty MD        CC: No Recipients    Isak Chakraborty MD  11/20/2024  3:04 PM  Sign when Signing Visit  A fetal ultrasound was completed. See Ob procedures in Epic for an interpretation and recommendations. Do not hesitate to contact us in [facility] if you have questions.    Isak Chakraborty MD, MSCE  Maternal Fetal Medicine      Awilda Polo  11/18/2024  1:41 PM  Sign when Signing Visit  Patient chose to have [facility] [id number] Non-Invasive Prenatal Screen [id number] PLUS w/ SCA, WITH fetal sex.  Patient choose to be billed through insurance.     Patient given brochure and is aware [facility] will contact patient's insurance and coordinate coverage.  Provided [facility] contact information. General inquiries [phone], Cost estimates [phone] and [facility] Billing [phone]. Website [email or web address].     Blood collection tubes labeled with patient identifiers (name, medical record number, and date of birth).     Filled out [facility] order form. Patient chose to be sent to an outpatient lab to complete blood work. .      If patient chose to have blood work drawn at a [facility] lab we requested patient notify [facility] (via phone call or KROGNI message) when blood collected so office can follow up on results.       Maternal Fetal Medicine will have results in approximately 5-7 business days and will call patient or notify via KROGNI.  Patient aware viewing lab result online will reveal fetal sex if ordered.    Patient verbalized understanding of all  instructions and no questions at this time.

## 2024-11-18 NOTE — ASSESSMENT & PLAN NOTE
-1 hr GTT ordered, pt encouraged to complete   -healthy lifestyle habits and appropriate weight gain discussed   -continue ASA

## 2024-11-19 ENCOUNTER — RESULTS FOLLOW-UP (OUTPATIENT)
Dept: OBGYN CLINIC | Facility: CLINIC | Age: 39
End: 2024-11-19

## 2024-11-19 ENCOUNTER — HOSPITAL ENCOUNTER (EMERGENCY)
Facility: HOSPITAL | Age: 39
Discharge: HOME | End: 2024-11-19
Attending: EMERGENCY MEDICINE
Payer: COMMERCIAL

## 2024-11-19 ENCOUNTER — TELEPHONE (OUTPATIENT)
Age: 39
End: 2024-11-19

## 2024-11-19 VITALS
TEMPERATURE: 97.9 F | OXYGEN SATURATION: 99 % | DIASTOLIC BLOOD PRESSURE: 106 MMHG | RESPIRATION RATE: 16 BRPM | SYSTOLIC BLOOD PRESSURE: 189 MMHG | HEART RATE: 105 BPM

## 2024-11-19 DIAGNOSIS — N93.9 VAGINAL BLEEDING: Primary | ICD-10-CM

## 2024-11-19 DIAGNOSIS — Z3A.12 12 WEEKS GESTATION OF PREGNANCY: Primary | ICD-10-CM

## 2024-11-19 LAB
C TRACH DNA SPEC QL NAA+PROBE: NEGATIVE
N GONORRHOEA DNA SPEC QL NAA+PROBE: NEGATIVE

## 2024-11-19 PROCEDURE — 99284 EMERGENCY DEPT VISIT MOD MDM: CPT | Performed by: EMERGENCY MEDICINE

## 2024-11-19 PROCEDURE — 99284 EMERGENCY DEPT VISIT MOD MDM: CPT

## 2024-11-19 NOTE — ED ATTENDING ATTESTATION
2024  I, Carmita Marinelli MD, saw and evaluated the patient. I have discussed the patient with the resident/non-physician practitioner and agree with the resident's/non-physician practitioner's findings, Plan of Care, and MDM as documented in the resident's/non-physician practitioner's note, except where noted. All available labs and Radiology studies were reviewed.  I was present for key portions of any procedure(s) performed by the resident/non-physician practitioner and I was immediately available to provide assistance.       At this point I agree with the current assessment done in the Emergency Department.  I have conducted an independent evaluation of this patient a history and physical is as follows:  This is a 39-year-old woman, 13 weeks pregnant, here with vaginal bleeding.  Patient was working and she felt like she needed to urinate.  She went to go to the bathroom, and had a gush of blood.  No fevers, chills, flank pain, no fluid, no passing of tissue.  No ongoing symptoms.  On exam the patient awake and alert, hypertensive, otherwise unremarkable vital signs.  Well-appearing.  Heart and lung exams are normal.  Abdomen is soft and nontender.  On bedside ultrasound, patient has an IUP with fetal heart rate in the 150s. MEDICAL DECISION MAKING    Number and Complexity of Problems  Differential diagnosis: Threatened , subchorionic hemorrhage    Medical Decision Making Data  External documents reviewed:   My EKG interpretation:   My CT interpretation:   My X-ray interpretation:   My ultrasound interpretation:     No orders to display       Labs Reviewed - No data to display    Labs reviewed by me are significant for:     Clinical decision rules/scores are significant for:     Discussed case with:   Considered admission for:     Treatment and Disposition  ED course: Patient seen and examined.  Patient's blood type is O+, patient does not require RhoGAM.  Will plan to discharge to follow-up  with OB with diagnosis of threatened AB  Shared decision making:   Code status:       ED Course         Critical Care Time  Procedures

## 2024-11-19 NOTE — ED PROVIDER NOTES
Time reflects when diagnosis was documented in both MDM as applicable and the Disposition within this note       Time User Action Codes Description Comment    2024  1:09 PM Devang Davis Add [N93.9] Vaginal bleeding           ED Disposition       ED Disposition   Discharge    Condition   Stable    Date/Time     1:09 PM    Comment   Teri Mckinney discharge to home/self care.                   Assessment & Plan       Medical Decision Making  39-year-old female, G1, P0 at 12 weeks 5 days gestational age presents today for evaluation of vaginal bleeding.    Differential: Vaginal trauma from previous speculum exam, subchorionic hemorrhage, spontaneous     Plan: Bedside point-of-care pelvic ultrasound.  Reassurance, OB/GYN follow-up.    On my bedside point-of-care ultrasound, fetal heart rate of 150 without evidence of subchorionic hemorrhage.  Will plan to discharge patient with follow-up to OB/GYN.  Return precautions given, all presented.  Patient is blood type O+.             Medications - No data to display    ED Risk Strat Scores                           SBIRT 20yo+      Flowsheet Row Most Recent Value   Initial Alcohol Screen: US AUDIT-C     1. How often do you have a drink containing alcohol? 0 Filed at: 2024 1235   2. How many drinks containing alcohol do you have on a typical day you are drinking?  0 Filed at: 2024 1235   3b. FEMALE Any Age, or MALE 65+: How often do you have 4 or more drinks on one occassion? 0 Filed at: 2024 1235   Audit-C Score 0 Filed at: 2024 1235   CHANTAL: How many times in the past year have you...    Used an illegal drug or used a prescription medication for non-medical reasons? Never Filed at: 2024 1235                            History of Present Illness       Chief Complaint   Patient presents with    Pregnancy Problem     Pt was working when she felt the urge to urinate. Pt noticed Bright red blood in toilet and upon  wiping. Denies pain/burning/discomfort. Pt approx 12 wks pregnant. Had an OB appointment yesterday.        Past Medical History:   Diagnosis Date    Depression     years ago hx of- no meds > 20 yrs    Female infertility     [facility]    Migraine     Hx of  none since middle school    Varicella     childhood chickenpox      Past Surgical History:   Procedure Laterality Date    OVUM / OOCYTE RETRIEVAL      WISDOM TOOTH EXTRACTION            Family History   Problem Relation Age of Onset    Hypertension Mother     Asthma Mother     Hypertension Father     Hyperlipidemia Father     Diabetes Father     Asthma Sister     Hypertension Sister     Hypertension Maternal Grandmother     Kidney cancer Maternal Grandmother     Prostate cancer Maternal Grandfather     Diabetes Paternal Grandmother     Diabetes Paternal Grandfather     No Known Problems Family     Breast cancer Neg Hx     Colon cancer Neg Hx     Ovarian cancer Neg Hx     Uterine cancer Neg Hx     Cervical cancer Neg Hx       Social History     Tobacco Use    Smoking status: Former     Current packs/day: 0.00     Average packs/day: 1 pack/day for 5.0 years (5.0 ttl pk-yrs)     Types: Cigarettes     Start date:      Quit date: 2017     Years since quittin.8    Smokeless tobacco: Never   Vaping Use    Vaping status: Former   Substance Use Topics    Alcohol use: Not Currently     Comment: rarely none with pregnancy    Drug use: Never     Comment: pt melecio, JUDITH- nidakoffi, JUDITH's brother drug hx- clean x 5 yrs      E-Cigarette/Vaping    E-Cigarette Use Former User       E-Cigarette/Vaping Substances    Nicotine No     THC No     CBD No     Flavoring No     Other No     Unknown No       I have reviewed and agree with the history as documented.     Patient is a 39-year-old G1, P0 female at 12 weeks 5 days gestational age presents today for evaluation of of bleeding.  Patient reports that she was at work today when she had a sudden urge to urinate.  She states  that she went to the bathroom and noticed bright red blood in the toilet bowl without clots that she believes was per vagina.  She called her OB/GYN who advised evaluation in the emergency department.  Patient states that she has been feeling well otherwise today.  Denies any fevers, chills, dysuria, frequency, contractions, gush of fluid, or abdominal or pelvic pain.  Denies any recent fevers and reports.  Patient did have an MFM and OB/GYN appointment yesterday during which time she had an ultrasound which showed normal fetal heart rate and activity and also had a speculum exam.  She notes that she did have a subchorionic hemorrhage earlier on in the pregnancy which resolved for ultrasound.  Her blood type is O+.        Review of Systems   Constitutional:  Negative for chills and fever.   HENT:  Negative for congestion, rhinorrhea and sore throat.    Eyes:  Negative for pain and visual disturbance.   Respiratory:  Negative for cough and shortness of breath.    Cardiovascular:  Negative for chest pain and palpitations.   Gastrointestinal:  Negative for abdominal pain, constipation, diarrhea, nausea and vomiting.   Genitourinary:  Positive for vaginal bleeding. Negative for dysuria and hematuria.   Musculoskeletal:  Negative for back pain and neck pain.   Skin:  Negative for color change and rash.   Neurological:  Negative for weakness and numbness.   All other systems reviewed and are negative.          Objective       ED Triage Vitals [11/19/24 1233]   Temperature Pulse Blood Pressure Respirations SpO2 Patient Position - Orthostatic VS   97.9 °F (36.6 °C) 105 (!) 189/106 16 99 % Lying      Temp Source Heart Rate Source BP Location FiO2 (%) Pain Score    Oral Monitor Right arm -- --      Vitals      Date and Time Temp Pulse SpO2 Resp BP Pain Score FACES Pain Rating User   11/19/24 1233 97.9 °F (36.6 °C) 105 99 % 16 189/106 -- -- SM            Physical Exam  Vitals and nursing note reviewed.   Constitutional:        General: She is not in acute distress.     Appearance: Normal appearance. She is well-developed. She is obese. She is not ill-appearing, toxic-appearing or diaphoretic.   HENT:      Head: Normocephalic and atraumatic.      Right Ear: External ear normal.      Left Ear: External ear normal.      Nose: Nose normal. No congestion or rhinorrhea.      Mouth/Throat:      Mouth: Mucous membranes are moist.      Pharynx: Oropharynx is clear.   Eyes:      General: No scleral icterus.        Right eye: No discharge.         Left eye: No discharge.      Conjunctiva/sclera: Conjunctivae normal.   Cardiovascular:      Rate and Rhythm: Normal rate and regular rhythm.      Pulses: Normal pulses.      Heart sounds: Normal heart sounds. No murmur heard.     No friction rub. No gallop.   Pulmonary:      Effort: Pulmonary effort is normal. No respiratory distress.      Breath sounds: Normal breath sounds. No stridor. No wheezing, rhonchi or rales.   Abdominal:      General: Abdomen is flat. Bowel sounds are normal. There is no distension.      Palpations: Abdomen is soft.      Tenderness: There is no abdominal tenderness. There is no guarding.   Musculoskeletal:         General: Normal range of motion.      Cervical back: Neck supple.   Skin:     General: Skin is warm and dry.      Capillary Refill: Capillary refill takes less than 2 seconds.      Coloration: Skin is not jaundiced or pale.   Neurological:      General: No focal deficit present.      Mental Status: She is alert and oriented to person, place, and time.   Psychiatric:         Mood and Affect: Mood normal.         Behavior: Behavior normal.         Results Reviewed       None            No orders to display       POC Pelvic US    Date/Time: 11/19/2024 1:03 PM    Performed by: Devang Davis MD  Authorized by: Devang Davis MD    Patient location:  ED  Procedure details:     Exam Type:  Diagnostic    Indications: pregnant with vaginal bleeding      Assessment for:  evaluate fetal viability      Technique:  Transabdominal obstetric (HCG+) exam    Views obtained: uterus (transverse and sagittal)      Image quality: diagnostic      Image availability:  Images available in PACS  Uterine findings:     Single gestation: identified      Fetal heart rate: identified      Fetal heart rate (bpm):  150  Interpretation:     Ultrasound impressions: normal      Pregnancy findings: intrauterine pregnancy (IUP)        ED Medication and Procedure Management   Prior to Admission Medications   Prescriptions Last Dose Informant Patient Reported? Taking?   Acetylcysteine 500 MG CAPS  Self Yes No   Sig: Take by mouth   CALCIUM PO  Self Yes No   Sig: Take by mouth   Cholecalciferol (VITAMIN D-3 PO)  Self Yes No   Sig: Take by mouth   Coenzyme Q10 (COQ10 PO)  Self Yes No   Sig: Take by mouth   Prasterone, DHEA, (DHEA PO)  Self Yes No   Sig: Take by mouth   Prenatal Vit-Fe Fumarate-FA (PRENATAL PO)  Self Yes No   Sig: Take by mouth   aspirin (Aspirin 81) 81 mg EC tablet   Yes No   Sig: Take 162 mg by mouth daily   choline fenofibrate (TRILIPIX) 135 MG capsule   Yes No   Sig: Take 265 mg by mouth daily   docusate sodium (COLACE) 100 mg capsule   Yes No   Sig: Take 100 mg by mouth 2 (two) times a day      Facility-Administered Medications: None     Discharge Medication List as of 11/19/2024  1:10 PM        CONTINUE these medications which have NOT CHANGED    Details   Acetylcysteine 500 MG CAPS Take by mouth, Historical Med      aspirin (Aspirin 81) 81 mg EC tablet Take 162 mg by mouth daily, Historical Med      CALCIUM PO Take by mouth, Historical Med      Cholecalciferol (VITAMIN D-3 PO) Take by mouth, Historical Med      choline fenofibrate (TRILIPIX) 135 MG capsule Take 265 mg by mouth daily, Historical Med      Coenzyme Q10 (COQ10 PO) Take by mouth, Historical Med      docusate sodium (COLACE) 100 mg capsule Take 100 mg by mouth 2 (two) times a day, Historical Med      Prasterone, DHEA, (DHEA PO)  Take by mouth, Historical Med      Prenatal Vit-Fe Fumarate-FA (PRENATAL PO) Take by mouth, Historical Med           No discharge procedures on file.  ED SEPSIS DOCUMENTATION   Time reflects when diagnosis was documented in both MDM as applicable and the Disposition within this note       Time User Action Codes Description Comment    11/19/2024  1:09 PM Devang Davis Add [N93.9] Vaginal bleeding                  Devagn Davis MD  11/20/24 0007

## 2024-11-19 NOTE — TELEPHONE ENCOUNTER
Call to pt and reviewed pelvic rest recommendation and to have her blood work done asap to tell blood type. Pt verbalized understanding, states she is O+. Plans to have labs drawn on Thursday. Pt aware to call back with heavy vaginal bleeding, passing large clots, severe pain, feeling lightheaded/dizzy or any other concerns/questions. Pt thankful.   
Incoming call from patient. OB patient 12w5d  seen in ED today for vaginal bleeding. Patient informed could be due to previously seen subchorionic hemorrhage. Per ED note, bedside ultrasound performed showing IUP with FHR of 150. Patient told to follow-up with OB regarding plan for follow-up. Bleeding has continued since being discharged from ED - patient is wearing pad - small amount of bright red bleeding accumulating on pad. Denies heavy bleeding and abdominal pain. Next OB appointment is scheduled for .    ESC sent to Dr. Godoy and routed to [facility].   
Patient calling from ED. Notes she started experiencing vaginal bleeding and went to ED for eval. Advised to call once discharged to schedule appropriate follow up at that time.     Message to Dr. Malone to make her aware.  
Yes

## 2024-11-19 NOTE — DISCHARGE INSTRUCTIONS
You have been evaluated in the emergency department for bleeding.  You are safe to be discharged back to work.    Please follow-up with your OB/GYN to schedule a follow-up appointment to be reevaluated for your symptoms.    Please return if you develop any new or concerning symptoms including worsening vaginal bleeding, severe abdomen or pelvic pain, or feelings of contractions.

## 2024-11-20 PROBLEM — O09.511 PRIMIGRAVIDA OF ADVANCED MATERNAL AGE IN FIRST TRIMESTER: Status: ACTIVE | Noted: 2024-11-20

## 2024-11-20 PROBLEM — O09.521 MULTIGRAVIDA OF ADVANCED MATERNAL AGE IN FIRST TRIMESTER: Status: RESOLVED | Noted: 2024-11-18 | Resolved: 2024-11-20

## 2024-11-20 NOTE — PROGRESS NOTES
A fetal ultrasound was completed. See Ob procedures in Epic for an interpretation and recommendations. Do not hesitate to contact us in [facility] if you have questions.    Isak Chakraborty MD, MSCE  Maternal Fetal Medicine

## 2024-11-21 ENCOUNTER — APPOINTMENT (OUTPATIENT)
Dept: LAB | Facility: CLINIC | Age: 39
End: 2024-11-21
Payer: COMMERCIAL

## 2024-11-21 ENCOUNTER — TRANSCRIBE ORDERS (OUTPATIENT)
Dept: LAB | Facility: CLINIC | Age: 39
End: 2024-11-21

## 2024-11-21 DIAGNOSIS — Z34.01 ENCOUNTER FOR SUPERVISION OF NORMAL FIRST PREGNANCY IN FIRST TRIMESTER: ICD-10-CM

## 2024-11-21 DIAGNOSIS — Z36.9 ENCOUNTER FOR ANTENATAL SCREENING: ICD-10-CM

## 2024-11-21 DIAGNOSIS — Z3A.12 12 WEEKS GESTATION OF PREGNANCY: ICD-10-CM

## 2024-11-21 DIAGNOSIS — Z31.430 ENCOUNTER OF FEMALE FOR TESTING FOR GENETIC DISEASE CARRIER STATUS FOR PROCREATIVE MANAGEMENT: ICD-10-CM

## 2024-11-21 DIAGNOSIS — O09.511 SUPERVISION OF ELDERLY PRIMIGRAVIDA IN FIRST TRIMESTER: ICD-10-CM

## 2024-11-21 DIAGNOSIS — O09.511 SUPERVISION OF ELDERLY PRIMIGRAVIDA IN FIRST TRIMESTER: Primary | ICD-10-CM

## 2024-11-21 LAB
ABO GROUP BLD: NORMAL
BACTERIA UR QL AUTO: ABNORMAL /HPF
BASOPHILS # BLD AUTO: 0.04 THOUSANDS/ÂΜL (ref 0–0.1)
BASOPHILS NFR BLD AUTO: 0 % (ref 0–1)
BILIRUB UR QL STRIP: NEGATIVE
CLARITY UR: CLEAR
COLOR UR: ABNORMAL
EOSINOPHIL # BLD AUTO: 0.11 THOUSAND/ÂΜL (ref 0–0.61)
EOSINOPHIL NFR BLD AUTO: 1 % (ref 0–6)
ERYTHROCYTE [DISTWIDTH] IN BLOOD BY AUTOMATED COUNT: 12.7 % (ref 11.6–15.1)
GLUCOSE 1H P 50 G GLC PO SERPL-MCNC: 123 MG/DL (ref 70–134)
GLUCOSE UR STRIP-MCNC: ABNORMAL MG/DL
HCT VFR BLD AUTO: 36.7 % (ref 34.8–46.1)
HGB BLD-MCNC: 11.7 G/DL (ref 11.5–15.4)
HGB UR QL STRIP.AUTO: ABNORMAL
IMM GRANULOCYTES # BLD AUTO: 0.09 THOUSAND/UL (ref 0–0.2)
IMM GRANULOCYTES NFR BLD AUTO: 1 % (ref 0–2)
KETONES UR STRIP-MCNC: NEGATIVE MG/DL
LEUKOCYTE ESTERASE UR QL STRIP: NEGATIVE
LYMPHOCYTES # BLD AUTO: 2.57 THOUSANDS/ÂΜL (ref 0.6–4.47)
LYMPHOCYTES NFR BLD AUTO: 22 % (ref 14–44)
MCH RBC QN AUTO: 29.6 PG (ref 26.8–34.3)
MCHC RBC AUTO-ENTMCNC: 31.9 G/DL (ref 31.4–37.4)
MCV RBC AUTO: 93 FL (ref 82–98)
MONOCYTES # BLD AUTO: 0.7 THOUSAND/ÂΜL (ref 0.17–1.22)
MONOCYTES NFR BLD AUTO: 6 % (ref 4–12)
NEUTROPHILS # BLD AUTO: 7.99 THOUSANDS/ÂΜL (ref 1.85–7.62)
NEUTS SEG NFR BLD AUTO: 70 % (ref 43–75)
NITRITE UR QL STRIP: NEGATIVE
NON-SQ EPI CELLS URNS QL MICRO: ABNORMAL /HPF
NRBC BLD AUTO-RTO: 0 /100 WBCS
PH UR STRIP.AUTO: 6.5 [PH]
PLATELET # BLD AUTO: 302 THOUSANDS/UL (ref 149–390)
PMV BLD AUTO: 10.2 FL (ref 8.9–12.7)
PROT UR STRIP-MCNC: ABNORMAL MG/DL
RBC # BLD AUTO: 3.95 MILLION/UL (ref 3.81–5.12)
RBC #/AREA URNS AUTO: ABNORMAL /HPF
RH BLD: POSITIVE
SP GR UR STRIP.AUTO: 1.03 (ref 1–1.03)
UROBILINOGEN UR STRIP-ACNC: <2 MG/DL
WBC # BLD AUTO: 11.5 THOUSAND/UL (ref 4.31–10.16)
WBC #/AREA URNS AUTO: ABNORMAL /HPF

## 2024-11-21 PROCEDURE — 82950 GLUCOSE TEST: CPT

## 2024-11-21 PROCEDURE — 86901 BLOOD TYPING SEROLOGIC RH(D): CPT

## 2024-11-21 PROCEDURE — 36415 COLL VENOUS BLD VENIPUNCTURE: CPT

## 2024-11-21 PROCEDURE — 85025 COMPLETE CBC W/AUTO DIFF WBC: CPT

## 2024-11-21 PROCEDURE — 81001 URINALYSIS AUTO W/SCOPE: CPT

## 2024-11-21 PROCEDURE — 81329 SMN1 GENE DOS/DELETION ALYS: CPT

## 2024-11-21 PROCEDURE — 86900 BLOOD TYPING SEROLOGIC ABO: CPT

## 2024-11-21 PROCEDURE — 87086 URINE CULTURE/COLONY COUNT: CPT

## 2024-11-21 PROCEDURE — 81220 CFTR GENE COM VARIANTS: CPT

## 2024-11-22 ENCOUNTER — RESULTS FOLLOW-UP (OUTPATIENT)
Age: 39
End: 2024-11-22

## 2024-11-23 LAB — BACTERIA UR CULT: NORMAL

## 2024-11-25 ENCOUNTER — RESULTS FOLLOW-UP (OUTPATIENT)
Dept: PERINATAL CARE | Facility: CLINIC | Age: 39
End: 2024-11-25

## 2024-11-25 ENCOUNTER — RESULTS FOLLOW-UP (OUTPATIENT)
Dept: OBGYN CLINIC | Facility: MEDICAL CENTER | Age: 39
End: 2024-11-25

## 2024-11-25 LAB
CFDNA.FET/CFDNA.TOTAL SFR FETUS: NORMAL %
CITATION REF LAB TEST: NORMAL
FET 13+18+21+X+Y ANEUP PLAS.CFDNA: NEGATIVE
FET CHR 21 TS PLAS.CFDNA QL: NEGATIVE
FET CHR 21 TS PLAS.CFDNA QL: NEGATIVE
FET MS X RISK WBC.DNA+CFDNA QL: NOT DETECTED
FET SEX PLAS.CFDNA DOSAGE CFDNA: NORMAL
FET TS 13 RISK PLAS.CFDNA QL: NEGATIVE
FET X + Y ANEUP RISK PLAS.CFDNA SEQ-IMP: NOT DETECTED
GA EST FROM CONCEPTION DATE: NORMAL D
GESTATIONAL AGE > 9:: YES
LAB DIRECTOR NAME PROVIDER: NORMAL
LAB DIRECTOR NAME PROVIDER: NORMAL
LABORATORY COMMENT REPORT: NORMAL
LIMITATIONS OF THE TEST: NORMAL
NEGATIVE PREDICTIVE VALUE: NORMAL
PERFORMANCE CHARACTERISTICS: NORMAL
POSITIVE PREDICTIVE VALUE: NORMAL
REF LAB TEST METHOD: NORMAL
SL AMB NOTE:: NORMAL
TEST PERFORMANCE INFO SPEC: NORMAL

## 2024-11-25 NOTE — RESULT ENCOUNTER NOTE
I have reviewed the patient's lab results which are normal.  Please contact the patient to inform her of the normal results, unless Ms. Mckinney has already reviewed the results using [facility] [facility]    Isak Chakraborty

## 2024-11-26 LAB
CITATION REF LAB TEST: NORMAL
CLINICAL INFO: NORMAL
ETHNIC BACKGROUND STATED: NORMAL
GENE DIS ANL CARRIER INTERP-IMP: NORMAL
GENE MUT TESTED BLD/T: NORMAL
LAB DIRECTOR NAME PROVIDER: NORMAL
REASON FOR REFERRAL (NARRATIVE): NORMAL
RECOMMENDATION PATIENT DOC-IMP: NORMAL
REF LAB TEST METHOD: NORMAL
SERVICE CMNT-IMP: NORMAL
SMN1 GENE MUT ANL BLD/T: NORMAL
SPECIMEN SOURCE: NORMAL

## 2024-12-09 ENCOUNTER — NURSE TRIAGE (OUTPATIENT)
Age: 39
End: 2024-12-09

## 2024-12-09 LAB
CFTR FULL MUT ANL BLD/T SEQ: NORMAL
CITATION REF LAB TEST: NORMAL
CLINICAL INFO: NORMAL
ETHNIC BACKGROUND STATED: NORMAL
GENE DIS ANL CARRIER INTERP-IMP: NORMAL
INDICATION: NORMAL
LAB DIRECTOR NAME PROVIDER: NORMAL
RECOMMENDATION PATIENT DOC-IMP: NORMAL
REF LAB TEST METHOD: NORMAL
SERVICE CMNT-IMP: NORMAL
SPECIMEN SOURCE: NORMAL

## 2024-12-09 NOTE — TELEPHONE ENCOUNTER
"Spoke with patient who is 15w4d, .  She reports being in the ER  for vaginal bleeding, it did stop a few days later.  The following week did not have any bleeding, but last week started with brown spotting when wiping. She does report URI and coughing which is when it started.  She denies recent intercourse, abd pain/LOF.  She was advised to continue to monitor, call back with any increase in bleeding/LOF or pain.  Pt verbalized understanding, no further questions or concerns at this time.     SEC to on call provider.   Per provider: No I agree with recommendations     Reason for Disposition   SPOTTING lasting > 48 hours or spotting happens more than once in a week    Answer Assessment - Initial Assessment Questions  1. ONSET: \"When did this bleeding start?\"        Last week  2. BLEEDING SEVERITY: \"Describe the bleeding that you are having.\" \"How much bleeding is there?\"       Spotting, brown.  3. ABDOMEN PAIN: \"Do you have any pain?\" \"How bad is the pain?\"  (e.g., Scale 0-10; none, mild, moderate, or severe)      0/10  4. PREGNANCY: \"Do you know how many weeks or months pregnant you are?\" \"When was the first day of your last normal menstrual period?\"      15w4d  5. ULTRASOUND: \"Have you had an ultrasound during this pregnancy?\"  Note: To confirm intrauterine pregnancy, placenta location.      yes  6. HEMODYNAMIC STATUS: \"Are you weak or feeling lightheaded?\" If Yes, ask: \"Can you stand and walk normally?\"       denies  7. OTHER SYMPTOMS: \"What other symptoms are you having with the bleeding?\" (e.g., passed tissue, vaginal discharge, fever, menstrual-type cramps)      Coughing for the last week    Protocols used: Pregnancy - Vaginal Bleeding Less Than 20 Weeks EGA-Adult-[place]    "

## 2024-12-11 ENCOUNTER — NURSE TRIAGE (OUTPATIENT)
Age: 39
End: 2024-12-11

## 2024-12-11 NOTE — TELEPHONE ENCOUNTER
"15w6d,  OB patient- called in on Monday with spotting in pregnancy and was advised to monitor for 2 days, and call back if she is still with spotting. (Was seen in the ED  for VB in pregnancy) Reports today, has brown spotting like discharge when she wipes - brown with \"flecks\" on the toilet paper. No pain or cramping, no active bleeding. Feels \"flutters\". No recent intercourse. No other symptoms. O+ blood type.  Confirmed IUP(IVF pregnancy). Discussed with patient light spotting can be normal in pregnancy. It is known that in pregnancy, the cervix is very vascular which means there is an increase in blood flow to the area. Also informed patient, the brown color typically signifies old blood so may just be residual from the prior bleeding. Reviewed with patient monitoring symptoms in the meantime, and contacting the office back with any new or worsening symptoms.  Bleeding precautions reviewed.   Encouraged she keep her visit as scheduled next week.      ESC sent to Dr Stroud. \"That sounds good\".     Reason for Disposition   SPOTTING lasting > 48 hours or spotting happens more than once in a week    Answer Assessment - Initial Assessment Questions  1. ONSET: \"When did this bleeding start?\"        1 week  2. BLEEDING SEVERITY: \"Describe the bleeding that you are having.\" \"How much bleeding is there?\"       Light brown spotting with wiping.   3. ABDOMEN PAIN: \"Do you have any pain?\" \"How bad is the pain?\"  (e.g., Scale 0-10; none, mild, moderate, or severe)      Denies   4. PREGNANCY: \"Do you know how many weeks or months pregnant you are?\" \"When was the first day of your last normal menstrual period?\"      15w6d   5. ULTRASOUND: \"Have you had an ultrasound during this pregnancy?\"  Note: To confirm intrauterine pregnancy, placenta location.      Yes   6. HEMODYNAMIC STATUS: \"Are you weak or feeling lightheaded?\" If Yes, ask: \"Can you stand and walk normally?\"       Denioes   7. OTHER SYMPTOMS: \"What other " "symptoms are you having with the bleeding?\" (e.g., passed tissue, vaginal discharge, fever, menstrual-type cramps)      Denies    Protocols used: Pregnancy - Vaginal Bleeding Less Than 20 Weeks EGA-Adult-[place]    "

## 2024-12-19 ENCOUNTER — ROUTINE PRENATAL (OUTPATIENT)
Dept: OBGYN CLINIC | Facility: CLINIC | Age: 39
End: 2024-12-19
Payer: COMMERCIAL

## 2024-12-19 VITALS
HEART RATE: 104 BPM | DIASTOLIC BLOOD PRESSURE: 74 MMHG | SYSTOLIC BLOOD PRESSURE: 124 MMHG | OXYGEN SATURATION: 99 % | BODY MASS INDEX: 46.99 KG/M2 | WEIGHT: 259 LBS

## 2024-12-19 DIAGNOSIS — Z34.02 PRENATAL CARE, FIRST PREGNANCY IN SECOND TRIMESTER: Primary | ICD-10-CM

## 2024-12-19 DIAGNOSIS — O99.210 OBESITY DURING PREGNANCY: ICD-10-CM

## 2024-12-19 DIAGNOSIS — O09.811 PREGNANCY RESULTING FROM IN VITRO FERTILIZATION IN FIRST TRIMESTER: ICD-10-CM

## 2024-12-19 DIAGNOSIS — Z3A.17 17 WEEKS GESTATION OF PREGNANCY: ICD-10-CM

## 2024-12-19 DIAGNOSIS — O09.511 PRIMIGRAVIDA OF ADVANCED MATERNAL AGE IN FIRST TRIMESTER: ICD-10-CM

## 2024-12-19 LAB
SL AMB  POCT GLUCOSE, UA: NEGATIVE
SL AMB POCT URINE PROTEIN: NEGATIVE

## 2024-12-19 PROCEDURE — 81002 URINALYSIS NONAUTO W/O SCOPE: CPT | Performed by: OBSTETRICS & GYNECOLOGY

## 2024-12-19 PROCEDURE — PNV: Performed by: OBSTETRICS & GYNECOLOGY

## 2024-12-19 RX ORDER — CALCIUM CARBONATE 500 MG/1
1 TABLET, CHEWABLE ORAL DAILY
COMMUNITY

## 2024-12-19 NOTE — ASSESSMENT & PLAN NOTE
Donor egg with fresh embryo transfer so no genetic testing done on embryo.  Fetal echo scheduled 2/7/25.

## 2024-12-19 NOTE — ASSESSMENT & PLAN NOTE
Early .  Repeat at 26-28 weeks.   Continue ASA.  Will need antepartum fetal surveillance in third trimester.

## 2024-12-19 NOTE — PROGRESS NOTES
Prenatal visit @ 17w0d.  Patient reports some tightening but rare and not painful.  Denies LOF.  +brown discharge recently.  She had VB on  and was evaluated in the ED.  That bleeding resolved over the next week. She then began to have a cough and noticed brown discharge associated with the coughing.  She reports it can vary in terms of amount but denies bleeding at present.  +FM.      Problem List Items Addressed This Visit       17 weeks gestation of pregnancy    MSAFP ordered.   precautions given.          Pregnancy resulting from in vitro fertilization in first trimester    Donor egg with fresh embryo transfer so no genetic testing done on embryo.  Fetal echo scheduled 25.         Obesity during pregnancy    Early .  Repeat at 26-28 weeks.   Continue ASA.  Will need antepartum fetal surveillance in third trimester.         Primigravida of advanced maternal age in first trimester    NIPT low risk.  MSAFP pending.  Early anatomy US 1/10/25.          Other Visit Diagnoses         Prenatal care, first pregnancy in second trimester    -  Primary    Relevant Orders    Alpha fetoprotein, maternal    POCT urine dip

## 2025-01-09 ENCOUNTER — APPOINTMENT (OUTPATIENT)
Dept: LAB | Facility: CLINIC | Age: 40
End: 2025-01-09
Payer: COMMERCIAL

## 2025-01-09 DIAGNOSIS — Z34.02 PRENATAL CARE, FIRST PREGNANCY IN SECOND TRIMESTER: ICD-10-CM

## 2025-01-09 PROCEDURE — 36415 COLL VENOUS BLD VENIPUNCTURE: CPT

## 2025-01-09 PROCEDURE — 82105 ALPHA-FETOPROTEIN SERUM: CPT

## 2025-01-10 ENCOUNTER — ROUTINE PRENATAL (OUTPATIENT)
Dept: PERINATAL CARE | Facility: OTHER | Age: 40
End: 2025-01-10
Payer: COMMERCIAL

## 2025-01-10 VITALS
BODY MASS INDEX: 47.84 KG/M2 | HEART RATE: 97 BPM | HEIGHT: 62 IN | WEIGHT: 260 LBS | DIASTOLIC BLOOD PRESSURE: 70 MMHG | SYSTOLIC BLOOD PRESSURE: 120 MMHG

## 2025-01-10 DIAGNOSIS — Z3A.20 20 WEEKS GESTATION OF PREGNANCY: Primary | ICD-10-CM

## 2025-01-10 DIAGNOSIS — O09.512 PRIMIGRAVIDA OF ADVANCED MATERNAL AGE IN SECOND TRIMESTER: ICD-10-CM

## 2025-01-10 PROBLEM — O09.812 PREGNANCY RESULTING FROM IN VITRO FERTILIZATION IN SECOND TRIMESTER: Status: ACTIVE | Noted: 2024-11-18

## 2025-01-10 PROCEDURE — 99213 OFFICE O/P EST LOW 20 MIN: CPT | Performed by: OBSTETRICS & GYNECOLOGY

## 2025-01-10 PROCEDURE — 76811 OB US DETAILED SNGL FETUS: CPT | Performed by: OBSTETRICS & GYNECOLOGY

## 2025-01-10 NOTE — PROGRESS NOTES
The patient was seen today for an ultrasound.  Please see ultrasound report (located under Ob Procedures) for additional details.   Thank you very much for allowing us to participate in the care of this very nice patient.  Should you have any questions, please do not hesitate to contact me.     Dagoberto Sim MD FACOG  Attending Physician, Maternal-Fetal Medicine  [facility]

## 2025-01-13 ENCOUNTER — ROUTINE PRENATAL (OUTPATIENT)
Dept: OBGYN CLINIC | Facility: CLINIC | Age: 40
End: 2025-01-13
Payer: COMMERCIAL

## 2025-01-13 VITALS
DIASTOLIC BLOOD PRESSURE: 78 MMHG | HEART RATE: 112 BPM | SYSTOLIC BLOOD PRESSURE: 128 MMHG | BODY MASS INDEX: 47.75 KG/M2 | WEIGHT: 263.2 LBS | OXYGEN SATURATION: 98 %

## 2025-01-13 DIAGNOSIS — O99.210 OBESITY DURING PREGNANCY: ICD-10-CM

## 2025-01-13 DIAGNOSIS — O09.812 PREGNANCY RESULTING FROM IN VITRO FERTILIZATION IN SECOND TRIMESTER: ICD-10-CM

## 2025-01-13 DIAGNOSIS — O09.512 PRIMIGRAVIDA OF ADVANCED MATERNAL AGE IN SECOND TRIMESTER: ICD-10-CM

## 2025-01-13 DIAGNOSIS — Z34.02 PRENATAL CARE, FIRST PREGNANCY IN SECOND TRIMESTER: Primary | ICD-10-CM

## 2025-01-13 DIAGNOSIS — Z3A.20 20 WEEKS GESTATION OF PREGNANCY: ICD-10-CM

## 2025-01-13 LAB
SL AMB  POCT GLUCOSE, UA: >2000
SL AMB POCT URINE PROTEIN: ABNORMAL

## 2025-01-13 PROCEDURE — PNV: Performed by: PHYSICIAN ASSISTANT

## 2025-01-13 PROCEDURE — 81002 URINALYSIS NONAUTO W/O SCOPE: CPT | Performed by: PHYSICIAN ASSISTANT

## 2025-01-13 NOTE — ASSESSMENT & PLAN NOTE
Teri  is a 39 y.o.  @20w4d who presents for routine prenatal visit.    Denies OB complaints.   EPDS 1    NIPT low risk   APF pending  Level II completed 1/10/25 - recommended echo @ 24 weeks, f/u growth US @ 28 weeks     TWG 4.99 kg (11 lb)    Good fetal movement.  Denies contractions, cramping, leakage of fluid or vaginal bleeding.     Reviewed PTL precautions and reasons to call.

## 2025-01-13 NOTE — PROGRESS NOTES
Prenatal visit  GA  20w 4d   Denies any vaginal bleeding, Leaking of fluid or pelvic cramping.   Normal Fetal movement.   Prenatal labs completed. WNL  AFP in process.   Fetal echo scheduled 02/07/2025  Level II completed 01/10/2025.  Flu vaccine completed through employee health.   -EPDS scale 1

## 2025-01-13 NOTE — ASSESSMENT & PLAN NOTE
Pregravid BMI 45 TWG 11 lbs. Goal 11-20.   A1c 8/2024 5.8  Passed early 1 hr. +4 glucosuria on dip today.   Some glucosuria on PN 1 labs, but urine dip negative at last 3 visits  Discussed diet and exercise recommendations.Will consider consult with dieticain.

## 2025-01-13 NOTE — ASSESSMENT & PLAN NOTE
Donor egg with fresh embryo transfer so no genetic testing done on embryo.  NIPT low risk  AFP pending  Fetal echo scheduled   28 week growth scan scheduled

## 2025-01-13 NOTE — PROGRESS NOTES
Problem List Items Addressed This Visit       20 weeks gestation of pregnancy    Teri  is a 39 y.o.  @20w4d who presents for routine prenatal visit.    Denies OB complaints.   EPDS 1    NIPT low risk   APF pending  Level II completed 1/10/25 - recommended echo @ 24 weeks, f/u growth US @ 28 weeks     TWG 4.99 kg (11 lb)    Good fetal movement.  Denies contractions, cramping, leakage of fluid or vaginal bleeding.     Reviewed PTL precautions and reasons to call.             Pregnancy resulting from in vitro fertilization in second trimester    Donor egg with fresh embryo transfer so no genetic testing done on embryo.  NIPT low risk  AFP pending  Fetal echo scheduled   28 week growth scan scheduled          Obesity during pregnancy    Pregravid BMI 45 TWG 11 lbs. Goal 11-20.   A1c 2024 5.8  Passed early 1 hr. +4 glucosuria on dip today.   Some glucosuria on PN 1 labs, but urine dip negative at last 3 visits  Discussed diet and exercise recommendations.Will consider consult with dieticain.              Primigravida of advanced maternal age in second trimester    NIPT low risk  AFP pending  Third trimester growth scan scheduled.          Other Visit Diagnoses         Prenatal care, first pregnancy in second trimester    -  Primary    Relevant Orders    POCT urine dip (Completed)

## 2025-01-15 ENCOUNTER — RESULTS FOLLOW-UP (OUTPATIENT)
Dept: OBGYN CLINIC | Facility: CLINIC | Age: 40
End: 2025-01-15

## 2025-01-15 LAB
2ND TRIMESTER 4 SCREEN SERPL-IMP: NORMAL
AFP ADJ MOM SERPL: 1.02
AFP INTERP AMN-IMP: NORMAL
AFP INTERP SERPL-IMP: NORMAL
AFP INTERP SERPL-IMP: NORMAL
AFP SERPL-MCNC: 41.9 NG/ML
AGE AT DELIVERY: 40.9 YR
GA METHOD: NORMAL
GA: 20 WEEKS
IDDM PATIENT QL: NO
MULTIPLE PREGNANCY: NO
NEURAL TUBE DEFECT RISK FETUS: NORMAL %

## 2025-02-03 NOTE — PATIENT INSTRUCTIONS
Thank you for choosing us for your  care today.  If you have any questions about your ultrasound or care, please do not hesitate to contact us or your primary obstetrician.        Some general instructions for your pregnancy are:    Exercise: Aim for 150 minutes per week of regular exercise.  Walking is great!  Nutrition: Choose healthy sources of calcium, iron, and protein.  Avoid ultraprocessed foods and added sugar.  Learn about Preeclampsia: preeclampsia is a common, potentially serious high blood pressure complication in pregnancy.  A blood pressure of 140mmHg (systolic or top number) or 90mmHg (diastolic or bottom number) should be evaluated by your doctor.  Aspirin is sometimes prescribed in early pregnancy to prevent preeclampsia in women with risk factors - ask your obstetrician if you should be on this medication.  For more resources, visit:  https://www.highriskpregnancyinfo.org/preeclampsia  If you smoke, please try to quit completely but also try to reduce your smoking by as much as possible (as soon as possible).  Do not vape.  Please also avoid cannabis products.  Other warning signs to watch out for in pregnancy or postpartum: chest pain, obstructed breathing or shortness of breath, seizures, thoughts of hurting yourself or your baby, bleeding, a painful or swollen leg, fever, or headache (see [facility] Warning Signs [facility]).  If these happen call 911.  Itching is also not normal in pregnancy and if you experience this, especially over your hands and feet, potentially worse at night, notify your doctors.     Kick Counts in Pregnancy   AMBULATORY CARE:   Kick counts  measure how much your baby is moving in your womb. A kick from your baby can be felt as a twist, turn, swish, roll, or jab. It is common to feel your baby kicking at 26 to 28 weeks of pregnancy. You may feel your baby kick as early as 20 weeks of pregnancy. You may want to start counting at 28 weeks.   Contact your  doctor immediately if:   You feel a change in the number of kicks or movements of your baby.      You feel fewer than 10 kicks within 2 hours.      You have questions or concerns about your baby's movements.     Why measure kick counts:  Your baby's movement may provide information about your baby's health. He or she may move less, or not at all, if there are problems. Your baby may move less if he or she is not getting enough oxygen or nutrition from the placenta. Do not smoke while you are pregnant. Smoking decreases the amount of oxygen that gets to your baby. Talk to your healthcare provider if you need help to quit smoking. Tell your healthcare provider as soon as you feel a change in your baby's movements.  When to measure kick counts:   Measure kick counts at the same time every day.       Measure kick counts when your baby is awake and most active. Your baby may be most active in the evening.     How to measure kick counts:  Check that your baby is awake before you measure kick counts. You can wake up your baby by lightly pushing on your belly, walking, or drinking something cold. Your healthcare provider may tell you different ways to measure kick counts. You may be told to do the following:  Use a chart or clock to keep track of the time you start and finish counting.      Sit in a chair or lie on your left side.      Place your hands on the largest part of your belly.      Count until you reach 10 kicks. Write down how much time it takes to count 10 kicks.      It may take 30 minutes to 2 hours to count 10 kicks. It should not take more than 2 hours to count 10 kicks.     Follow up with your doctor as directed:  Write down your questions so you remember to ask them during your visits.   © Copyright [employer] 2023 Information is for End User's use only and may not be sold, redistributed or otherwise used for commercial purposes.  The above information is an  only. It is not intended as  medical advice for individual conditions or treatments. Talk to your doctor, nurse or pharmacist before following any medical regimen to see if it is safe and effective for you.

## 2025-02-06 PROBLEM — O99.212 SEVERE OBESITY DUE TO EXCESS CALORIES AFFECTING PREGNANCY IN SECOND TRIMESTER (HCC): Status: ACTIVE | Noted: 2025-02-06

## 2025-02-06 PROBLEM — E66.01 SEVERE OBESITY DUE TO EXCESS CALORIES AFFECTING PREGNANCY IN SECOND TRIMESTER (HCC): Status: ACTIVE | Noted: 2025-02-06

## 2025-02-06 PROBLEM — Z3A.24 24 WEEKS GESTATION OF PREGNANCY: Status: ACTIVE | Noted: 2025-02-06

## 2025-02-07 ENCOUNTER — ROUTINE PRENATAL (OUTPATIENT)
Dept: PERINATAL CARE | Facility: OTHER | Age: 40
End: 2025-02-07
Payer: COMMERCIAL

## 2025-02-07 VITALS
SYSTOLIC BLOOD PRESSURE: 120 MMHG | HEIGHT: 62 IN | BODY MASS INDEX: 48.95 KG/M2 | DIASTOLIC BLOOD PRESSURE: 62 MMHG | HEART RATE: 98 BPM | WEIGHT: 266 LBS

## 2025-02-07 DIAGNOSIS — O99.212 SEVERE OBESITY DUE TO EXCESS CALORIES AFFECTING PREGNANCY IN SECOND TRIMESTER (HCC): ICD-10-CM

## 2025-02-07 DIAGNOSIS — O09.812 PREGNANCY RESULTING FROM IN VITRO FERTILIZATION IN SECOND TRIMESTER: Primary | ICD-10-CM

## 2025-02-07 DIAGNOSIS — E66.01 SEVERE OBESITY DUE TO EXCESS CALORIES AFFECTING PREGNANCY IN SECOND TRIMESTER (HCC): ICD-10-CM

## 2025-02-07 DIAGNOSIS — O09.512 PRIMIGRAVIDA OF ADVANCED MATERNAL AGE IN SECOND TRIMESTER: ICD-10-CM

## 2025-02-07 DIAGNOSIS — Z3A.24 24 WEEKS GESTATION OF PREGNANCY: ICD-10-CM

## 2025-02-07 PROCEDURE — 76825 ECHO EXAM OF FETAL HEART: CPT | Performed by: OBSTETRICS & GYNECOLOGY

## 2025-02-07 PROCEDURE — 93325 DOPPLER ECHO COLOR FLOW MAPG: CPT | Performed by: OBSTETRICS & GYNECOLOGY

## 2025-02-07 PROCEDURE — 76827 ECHO EXAM OF FETAL HEART: CPT | Performed by: OBSTETRICS & GYNECOLOGY

## 2025-02-07 PROCEDURE — 99214 OFFICE O/P EST MOD 30 MIN: CPT | Performed by: OBSTETRICS & GYNECOLOGY

## 2025-02-07 NOTE — PROGRESS NOTES
Teri Mckinney  has no complaints today at 24w1d. She reports fetal movements and does not report any vaginal bleeding or signs of labor.  Her recently completed fetal testing revealed a normal NIPT MSAFP and early Glucola. She is here today for an ultrasound for a fetal echo.    Problem list:  IVF pregnancy  Advanced maternal age  Obesity based on a pregravid BMI of 46    Ultrasound findings:  The ultrasound today shows a normal fetal echo.      Pregnancy ultrasound has limitations and is unable to detect all forms of fetal congenital abnormalities.      Follow up recommended:   Recommend a follow-up ultrasound for growth at 32 weeks  Recommend weekly nonstress test and fluid scans at 34 weeks    Pre visit time reviewing her records   5 minutes  Face to face time 5 minutes  Post visit time on documentation of note, updating her problem list, adding orders and prescriptions 5 minutes.  Procedures that were completed today were charged separately.   The level of decision making was straight forward.    Yahaira Terrazas MD

## 2025-02-07 NOTE — LETTER
February 7, 2025     Alanis Godoy MD  [address] [facility] [id number]    Patient: Teri Mckinney   YOB: 1985   Date of Visit: 2/7/2025       Dear Dr. Godoy:    Thank you for referring Teri Mckinney to me for evaluation. Below are my notes for this consultation.    If you have questions, please do not hesitate to call me. I look forward to following your patient along with you.         Sincerely,        Yahaira Terrazas MD        CC: No Recipients    Yahaira Terrazas MD  2/7/2025  4:17 PM  Sign when Signing Visit  Teri Mckinney  has no complaints today at 24w1d. She reports fetal movements and does not report any vaginal bleeding or signs of labor.  Her recently completed fetal testing revealed a normal NIPT MSAFP and early Glucola. She is here today for an ultrasound for a fetal echo.    Problem list:  IVF pregnancy  Advanced maternal age  Obesity based on a pregravid BMI of 46    Ultrasound findings:  The ultrasound today shows a normal fetal echo.      Pregnancy ultrasound has limitations and is unable to detect all forms of fetal congenital abnormalities.      Follow up recommended:   Recommend a follow-up ultrasound for growth at 32 weeks  Recommend weekly nonstress test and fluid scans at 34 weeks    Pre visit time reviewing her records   5 minutes  Face to face time 5 minutes  Post visit time on documentation of note, updating her problem list, adding orders and prescriptions 5 minutes.  Procedures that were completed today were charged separately.   The level of decision making was straight forward.    Yahaira Terrazas MD

## 2025-02-13 ENCOUNTER — ROUTINE PRENATAL (OUTPATIENT)
Dept: OBGYN CLINIC | Facility: CLINIC | Age: 40
End: 2025-02-13

## 2025-02-13 VITALS
SYSTOLIC BLOOD PRESSURE: 108 MMHG | DIASTOLIC BLOOD PRESSURE: 72 MMHG | OXYGEN SATURATION: 98 % | WEIGHT: 265 LBS | HEART RATE: 103 BPM | BODY MASS INDEX: 48.47 KG/M2

## 2025-02-13 DIAGNOSIS — O99.212 SEVERE OBESITY DUE TO EXCESS CALORIES AFFECTING PREGNANCY IN SECOND TRIMESTER (HCC): ICD-10-CM

## 2025-02-13 DIAGNOSIS — O09.812 PREGNANCY RESULTING FROM IN VITRO FERTILIZATION IN SECOND TRIMESTER: ICD-10-CM

## 2025-02-13 DIAGNOSIS — Z34.02 PRENATAL CARE, FIRST PREGNANCY IN SECOND TRIMESTER: ICD-10-CM

## 2025-02-13 DIAGNOSIS — E66.01 SEVERE OBESITY DUE TO EXCESS CALORIES AFFECTING PREGNANCY IN SECOND TRIMESTER (HCC): ICD-10-CM

## 2025-02-13 DIAGNOSIS — Z11.3 SCREENING FOR STD (SEXUALLY TRANSMITTED DISEASE): Primary | ICD-10-CM

## 2025-02-13 DIAGNOSIS — O09.512 PRIMIGRAVIDA OF ADVANCED MATERNAL AGE IN SECOND TRIMESTER: ICD-10-CM

## 2025-02-13 DIAGNOSIS — Z3A.25 25 WEEKS GESTATION OF PREGNANCY: ICD-10-CM

## 2025-02-13 PROCEDURE — PNV: Performed by: OBSTETRICS & GYNECOLOGY

## 2025-02-13 NOTE — PROGRESS NOTES
Problem List Items Addressed This Visit     Pregnancy resulting from in vitro fertilization in second trimester    Primigravida of advanced maternal age in second trimester    25 weeks gestation of pregnancy    Has wrist pain worse on right; reviewed recommendation to try wrist braces and then if not improved would send to ortho. Also has 2+ b/l LE edema. 28 wk labs ordered.          Relevant Orders    POCT urine dip    Anemia Panel w/Reflex, OB    CBC and differential    Glucose, 1H PG    Severe obesity due to excess calories affecting pregnancy in second trimester (HCC)    Has growth US set up. PG BMI 46; TWG 12 lb        Other Visit Diagnoses       Screening for STD (sexually transmitted disease)    -  Primary    Relevant Orders    RPR-Syphilis Screening (Total Syphilis IGG/IGM)      Prenatal care, first pregnancy in second trimester        Relevant Orders    POCT urine dip        Pre-Medardo Vitals    Flowsheet Row Most Recent Value   Prenatal Assessment    Fetal Heart Rate 140   Movement Present   Prenatal Vitals    Blood Pressure 108/72   Weight - Scale 120 kg (265 lb)   Urine Albumin/Glucose    Dilation/Effacement/Station    Vaginal Drainage    Edema

## 2025-02-13 NOTE — ASSESSMENT & PLAN NOTE
Has wrist pain worse on right; reviewed recommendation to try wrist braces and then if not improved would send to ortho. Also has 2+ b/l LE edema. 28 wk labs ordered.

## 2025-02-24 ENCOUNTER — CLINICAL SUPPORT (OUTPATIENT)
Dept: POSTPARTUM | Facility: CLINIC | Age: 40
End: 2025-02-24

## 2025-02-24 DIAGNOSIS — Z32.2 ENCOUNTER FOR CHILDBIRTH INSTRUCTION: Primary | ICD-10-CM

## 2025-02-25 ENCOUNTER — CLINICAL SUPPORT (OUTPATIENT)
Dept: POSTPARTUM | Facility: CLINIC | Age: 40
End: 2025-02-25

## 2025-02-25 DIAGNOSIS — Z32.2 ENCOUNTER FOR CHILDBIRTH INSTRUCTION: Primary | ICD-10-CM

## 2025-03-01 ENCOUNTER — APPOINTMENT (OUTPATIENT)
Dept: LAB | Facility: MEDICAL CENTER | Age: 40
End: 2025-03-01
Payer: COMMERCIAL

## 2025-03-01 DIAGNOSIS — Z3A.25 25 WEEKS GESTATION OF PREGNANCY: ICD-10-CM

## 2025-03-01 DIAGNOSIS — Z11.3 SCREENING FOR STD (SEXUALLY TRANSMITTED DISEASE): ICD-10-CM

## 2025-03-01 PROCEDURE — 82950 GLUCOSE TEST: CPT

## 2025-03-01 PROCEDURE — 85025 COMPLETE CBC W/AUTO DIFF WBC: CPT

## 2025-03-01 PROCEDURE — 86780 TREPONEMA PALLIDUM: CPT

## 2025-03-01 PROCEDURE — 36415 COLL VENOUS BLD VENIPUNCTURE: CPT

## 2025-03-02 LAB
BASOPHILS # BLD AUTO: 0.04 THOUSANDS/ÂΜL (ref 0–0.1)
BASOPHILS NFR BLD AUTO: 0 % (ref 0–1)
EOSINOPHIL # BLD AUTO: 0.07 THOUSAND/ÂΜL (ref 0–0.61)
EOSINOPHIL NFR BLD AUTO: 1 % (ref 0–6)
ERYTHROCYTE [DISTWIDTH] IN BLOOD BY AUTOMATED COUNT: 12.8 % (ref 11.6–15.1)
GLUCOSE 1H P 50 G GLC PO SERPL-MCNC: 205 MG/DL (ref 70–134)
HCT VFR BLD AUTO: 38.2 % (ref 34.8–46.1)
HGB BLD-MCNC: 12 G/DL (ref 11.5–15.4)
IMM GRANULOCYTES # BLD AUTO: 0.07 THOUSAND/UL (ref 0–0.2)
IMM GRANULOCYTES NFR BLD AUTO: 1 % (ref 0–2)
LYMPHOCYTES # BLD AUTO: 1.88 THOUSANDS/ÂΜL (ref 0.6–4.47)
LYMPHOCYTES NFR BLD AUTO: 16 % (ref 14–44)
MCH RBC QN AUTO: 29.8 PG (ref 26.8–34.3)
MCHC RBC AUTO-ENTMCNC: 31.4 G/DL (ref 31.4–37.4)
MCV RBC AUTO: 95 FL (ref 82–98)
MONOCYTES # BLD AUTO: 0.49 THOUSAND/ÂΜL (ref 0.17–1.22)
MONOCYTES NFR BLD AUTO: 4 % (ref 4–12)
NEUTROPHILS # BLD AUTO: 8.95 THOUSANDS/ÂΜL (ref 1.85–7.62)
NEUTS SEG NFR BLD AUTO: 78 % (ref 43–75)
NRBC BLD AUTO-RTO: 0 /100 WBCS
PLATELET # BLD AUTO: 362 THOUSANDS/UL (ref 149–390)
PMV BLD AUTO: 10.8 FL (ref 8.9–12.7)
RBC # BLD AUTO: 4.03 MILLION/UL (ref 3.81–5.12)
WBC # BLD AUTO: 11.5 THOUSAND/UL (ref 4.31–10.16)

## 2025-03-03 ENCOUNTER — RESULTS FOLLOW-UP (OUTPATIENT)
Dept: OBGYN CLINIC | Facility: CLINIC | Age: 40
End: 2025-03-03

## 2025-03-03 ENCOUNTER — TELEPHONE (OUTPATIENT)
Age: 40
End: 2025-03-03

## 2025-03-03 DIAGNOSIS — O24.419 GESTATIONAL DIABETES MELLITUS (GDM) IN THIRD TRIMESTER, GESTATIONAL DIABETES METHOD OF CONTROL UNSPECIFIED: Primary | ICD-10-CM

## 2025-03-03 LAB — TREPONEMA PALLIDUM IGG+IGM AB [PRESENCE] IN SERUM OR PLASMA BY IMMUNOASSAY: NORMAL

## 2025-03-03 NOTE — TELEPHONE ENCOUNTER
Pt called MFM stating referral was placed by OB, Alanis Godoy, for Diabetic EDU. It appears a referral was placed, however, does not specify MFM for services. Communicated to patient that a message would be sent to OB office to have new referral placed in order to schedule MFM appt. Thank you.

## 2025-03-03 NOTE — TELEPHONE ENCOUNTER
----- Message from Alanis Godoy MD sent at 3/3/2025  2:13 PM EST -----  HI, can you please put referral to DM education for BG meter and DM ed classes in? New dx GDM? Please see if she has any questions below\    Called pt - informed of recent glucose result & new dx of GDM. Referral entered for diabetic pathways.  Pt to call today for an appt. Advised to call with any further questions/concerns.       2nd trimester phone call:  Overall how are you doing? Good - but just diagnosed with GDM- referral entered for diabetic pathways    Compliant with routine OB care appointments? yes    Have you completed your 1st trimester labs? yes    If you had NIPS with MFM, do you have a order for MSAFP? yes   Can be completed 15w-22w6d, ideally 16w-18w    Have you seen MFM and do you have your detailed US scheduled? Yes   It's A Girl!!    Pregnancy Education-have you had a chance to review the classes offered and registered? Registered for most classes.

## 2025-03-03 NOTE — TELEPHONE ENCOUNTER
New order placed. Spoke to MFM to make sure correct. Patient aware she can call or wait for them to call her.

## 2025-03-13 ENCOUNTER — ROUTINE PRENATAL (OUTPATIENT)
Dept: OBGYN CLINIC | Facility: CLINIC | Age: 40
End: 2025-03-13
Payer: COMMERCIAL

## 2025-03-13 VITALS — DIASTOLIC BLOOD PRESSURE: 66 MMHG | WEIGHT: 265 LBS | BODY MASS INDEX: 48.47 KG/M2 | SYSTOLIC BLOOD PRESSURE: 110 MMHG

## 2025-03-13 DIAGNOSIS — O09.812 PREGNANCY RESULTING FROM IN VITRO FERTILIZATION IN SECOND TRIMESTER: ICD-10-CM

## 2025-03-13 DIAGNOSIS — Z23 NEED FOR TDAP VACCINATION: ICD-10-CM

## 2025-03-13 DIAGNOSIS — O24.410 DIET CONTROLLED GESTATIONAL DIABETES MELLITUS (GDM) IN THIRD TRIMESTER: ICD-10-CM

## 2025-03-13 DIAGNOSIS — Z3A.29 29 WEEKS GESTATION OF PREGNANCY: Primary | ICD-10-CM

## 2025-03-13 LAB
DME PARACHUTE DELIVERY DATE REQUESTED: NORMAL
DME PARACHUTE ITEM DESCRIPTION: NORMAL
DME PARACHUTE ORDER STATUS: NORMAL
DME PARACHUTE SUPPLIER NAME: NORMAL
DME PARACHUTE SUPPLIER PHONE: NORMAL

## 2025-03-13 PROCEDURE — 90715 TDAP VACCINE 7 YRS/> IM: CPT | Performed by: STUDENT IN AN ORGANIZED HEALTH CARE EDUCATION/TRAINING PROGRAM

## 2025-03-13 PROCEDURE — PNV: Performed by: STUDENT IN AN ORGANIZED HEALTH CARE EDUCATION/TRAINING PROGRAM

## 2025-03-13 PROCEDURE — 90471 IMMUNIZATION ADMIN: CPT | Performed by: STUDENT IN AN ORGANIZED HEALTH CARE EDUCATION/TRAINING PROGRAM

## 2025-03-13 NOTE — PROGRESS NOTES
CLASS 1   The Diabetes and Pregnancy Program  Group  virtual visit    Teri Mckinney is a 39 y.o. female who presents today unaccompanied for Patient Education (Class 1), Virtual Regular Visit, Gestational Diabetes (29w5d/), and Virtual Regular Visit Patient is at 29w5d gestation, Estimated Date of Delivery: 25.     Self assessment responses pending completion of questionnaire sent via AMS VariCode message. Reviewed the following from patients medical record: Demographics, Education, Occupation, PMH, Allergies, and Current Medications.     Problem List Items Addressed This Visit       29 weeks gestation of pregnancy      Orders:    [facility] glucose flowsheet         Relevant Orders    Egenera glucose flowsheet    Gestational diabetes mellitus (GDM) in third trimester - Primary      Lab Results   Component Value Date    HGBA1C 5.8 (H) 2024     Orders:    Ambulatory Referral to Maternal Fetal Medicine    Egenera glucose flowsheet         Relevant Orders    Egenera glucose flowsheet     *Orders placed to repeat A1c/CMP    Discussed:   - Pathophysiology of Gestational diabetes mellitus (GDM) in third trimester, gestational diabetes method of control unspecified [O24.419].  - Untreated hyperglycemia in pregnancy and maternal fetal complications (fetal macrosomia,  hypoglycemia, polyhydramnios, increased incidence of  section,  labor, and in severe cases fetal demise and still birth).   - Importance of blood glucose monitoring, nutrition, and medication if necessary in achieving BG goals.     LABS  Lab Results   Component Value Date/Time    [id number] 205 (H) 2025 10:21 AM      Lab Results   Component Value Date/Time    GLUF 99 2014 09:25 AM      Lab Results   Component Value Date/Time    HGBA1C 5.8 (H) 2024 05:33 PM    HGBA1C 5.8 (H) 2023 09:58 AM    HGBA1C 6.2 (H) 2022 09:20 AM    HGBA1C 6.9 (H) 2015 09:22 AM        MEDICATIONS  Diabetic Medications:  "None    ANTHROPOMETRICS  Weight Gain in Pregnancy:  Current TWG (5.806 kg (12 lb 12.8 oz)) compared to recommended TWG (5 kg (11 lb)-9 kg (19 lb)): Within Range -- Continue recommended rate of weight gain based on pre-pregnancy BMI till delivery    Pre-Pregnancy:   Pre-Gravid Wt: 114 kg (252 lb 3.2 oz)  Pre-Gravid BMI: 46.12    Current:  Ht Readings from Last 1 Encounters:   02/07/25 5' 2\" (1.575 m)      Wt Readings from Last 3 Encounters:   03/13/25 120 kg (265 lb)   02/13/25 120 kg (265 lb)   02/07/25 121 kg (266 lb)      Current BMI: There is no height or weight on file to calculate BMI.    RECENT ULTRASOUND RESULTS  Findings: NML Growth/TONYA - 1/10/25; Fetal Echo 2/7/25  See US note for additional details.   Next US:  4/4/25    BLOOD GLUCOSE MONITORING  Ordered Glucometer, Testing Strips, and Lancets - Contour Next (sent Crunchbutton message about [employer]); Patient reports purchasing FreeStyle Precision Josef meter and supplies over the counter prior to visit  Meter Teaching: Gave instruction on site selection, skin preparation, loading strips and lancet device, meter activation, obtaining blood sample, test strip and lancet disposal and storage, and recording log book entries.   - Frequency: 4 x per day (Fasting, 2 hour after start of each meal)  - Goals: (Fasting) 60-95mg/dL // (2hr PP) <120mg/dL  - Reporting: Weekly via Crunchbutton Glucose Flowsheet     DIET   Meal Plan Recommendations:  - Daily Calories/Carbohydrate/Protein: 1800 calorie (CHO: 45-15-45-15-45-30) (PRO: 2-1-3-1-3-2)   - Appropriate amounts of CHO, PRO, and Fat at each meal and snack.   - CHO exchange list, and portion sizes for both CHO and PRO  - How to read a food label  - Suggested meal/snack options to increase nutrition and maintain consistent meal and snack intakes  - Eat every 2.0-3.5 hours while awake  - Go no longer than 8-10 hours fasting overnight until first meal of the day.     PHYSICAL ACTIVITY  - Discussed benefits of physical activity to " "optimize blood glucose control, encouraged activity at patient is physically able.   - Instructed pt to always consult a physician prior to starting an exercise program.   - Recommend 20-30 minutes daily.     Patient Stated Goal: \"I will report my blood sugar values to the diabetes educators each week\"  Expected Compliance: good  Barriers to Learning/Change: No Barriers  Diabetes Self Management Support Plan (outside of ongoing care): Spouse/Family     Follow-up: Return in about 1 week (around 3/25/2025) for Class 2.   - Patient instructed to bring 3 day food diary and/or write down foods associated with elevated after meal blood sugars  - Call ([phone]) with any questions or concerns.    Start Time: 2:28 pm  End Time: 3:30 pm     Aleena Hernández RD   Diabetes Educator  [facility] Maternal Fetal Medicine  Diabetes and Pregnancy Program  [address] [place] [address], [address]  [place] [id number]  Virtual Regular VisitName: Teri Mckinney      : 1985      MRN: [id number]  Encounter Provider: Aleena Hernández RD  Encounter Date: 3/18/2025   Encounter department: [facility]  :  Assessment & Plan  Gestational diabetes mellitus (GDM) in third trimester, gestational diabetes method of control unspecified    Lab Results   Component Value Date    HGBA1C 5.8 (H) 2024     Orders:    Ambulatory Referral to Maternal Fetal Medicine    Mychart glucose flowsheet    29 weeks gestation of pregnancy    Orders:    [facility]art glucose flowsheet    Gestational diabetes mellitus (GDM) in third trimester, gestational diabetes method of control unspecified    Lab Results   Component Value Date    HGBA1C 5.8 (H) 2024          29 weeks gestation of pregnancy               History of Present Illness     HPI  Review of Systems    Objective   LMP 2024     Physical Exam    Administrative Statements   Encounter provider Aleena Hernández RD    The Patient is located at Other and in the following state in which " I hold an active license [place].    The patient was identified by name and date of birth. Teri Mckinney was informed that this is a telemedicine visit and that the visit is being conducted through the [employer] platform. She agrees to proceed..  My office door was closed. No one else was in the room.  She acknowledged consent and understanding of privacy and security of the video platform. The patient has agreed to participate and understands they can discontinue the visit at any time.    I have spent a total time of 60 minutes in caring for this patient on the day of the visit/encounter including Instructions for management, Patient and family education, Importance of tx compliance, Risk factor reductions, Counseling / Coordination of care, Documenting in the medical record, and Reviewing/placing orders in the medical record (including tests, medications, and/or procedures), not including the time spent for establishing the audio/video connection.

## 2025-03-13 NOTE — ASSESSMENT & PLAN NOTE
Donor egg with fresh embryo transfer so no genetic testing done on embryo.  NIPT low risk  AFP nml  Fetal echo nml  32 weeK growth

## 2025-03-13 NOTE — PROGRESS NOTES
29 weeks gestation of pregnancy  38 yo here for ob visit. G1 at 29+0. No contractions, leaking or bleeding. Great movement. Has some LE swelling and occasionally belly tightening at the end of the day. Reviewed comfort measures. Return in 2 wks.    Gestational diabetes mellitus (GDM) in third trimester  First appt 3/18/25  Lab Results   Component Value Date    HGBA1C 5.8 (H) 2024       Pregnancy resulting from in vitro fertilization in second trimester  Donor egg with fresh embryo transfer so no genetic testing done on embryo.  NIPT low risk  AFP nml  Fetal echo nml  32 weeK growth     The patient was counseled about the labor process. She was counseled regarding potential reasons that she may need a  section including arrest of dilation/descent, non-reassuring fetal status, or worsening maternal status.      She was counseled on the risks of  including bleeding, infection, and injury to surrounding structures including the bowel and bladder. She was counseled that there are medical and surgical methods to manage excessive postpartum bleeding. She was counseled that in the event of excessive blood loss, she may require blood transfusion which includes a small risk of blood borne diseases such as hepatitis and HIV. The patient is OK with receiving a blood transfusion if necessary.  The patient had an opportunity to ask questions and signed consent.      She was counseled about the possible need for operative delivery using the vacuum or forceps and the indications for doing so. She was counseled that there is a small risk of  complications including intracranial hemorrhage, lacerations, nerve damage or fracture as well as the increased risk for more severe maternal laceration. The patient signed consent.

## 2025-03-13 NOTE — PROGRESS NOTES
Pt is here for routine ob visit   No concerns at this time  Unable to void   No LOF,VB,Contractions   +FM   28wk Labs completed   UTD flu vaccine   Tdap given today/pt tolerated well   Yellow folder given/reviewed w/ pt  Delivery Consent signed today   Breast Pump ordered

## 2025-03-13 NOTE — ASSESSMENT & PLAN NOTE
40 yo here for ob visit. G1 at 29+0. No contractions, leaking or bleeding. Great movement. Has some LE swelling and occasionally belly tightening at the end of the day. Reviewed comfort measures. Return in 2 wks.

## 2025-03-18 ENCOUNTER — TELEMEDICINE (OUTPATIENT)
Facility: HOSPITAL | Age: 40
End: 2025-03-18
Attending: OBSTETRICS & GYNECOLOGY
Payer: COMMERCIAL

## 2025-03-18 DIAGNOSIS — Z3A.29 29 WEEKS GESTATION OF PREGNANCY: ICD-10-CM

## 2025-03-18 DIAGNOSIS — O24.419 GESTATIONAL DIABETES MELLITUS (GDM) IN THIRD TRIMESTER, GESTATIONAL DIABETES METHOD OF CONTROL UNSPECIFIED: Primary | ICD-10-CM

## 2025-03-18 PROCEDURE — G0109 DIAB MANAGE TRN IND/GROUP: HCPCS | Performed by: DIETITIAN, REGISTERED

## 2025-03-18 RX ORDER — BLOOD-GLUCOSE METER
EACH MISCELLANEOUS
Qty: 1 KIT | Refills: 0 | Status: SHIPPED | OUTPATIENT
Start: 2025-03-18

## 2025-03-18 RX ORDER — LANCETS
EACH MISCELLANEOUS
Qty: 100 EACH | Refills: 3 | Status: SHIPPED | OUTPATIENT
Start: 2025-03-18

## 2025-03-18 NOTE — PATIENT INSTRUCTIONS
CLASS 1   The Diabetes and Pregnancy Program   Formerly [facility] - Maternal Fetal Medicine    Diabetes Team  - GODFREY Nayak CDE   - Alice (Sunny Hayden RD MS  - Aleena Hernández RD CDE MPH  - Etelvina Velasco RDN, LDN    Contact  - Reply to a previous message from the diabetes team  - Send a message to Nolvia Shaw  - Call [phone] (recommended if high priority)    Gestational Diabetes    CHECKING BLOOD SUGARS  Carry your meter and testing supplies with you at all times. Bring your glucose meter to all your appointments in our [facility] office.     Your prescription for a glucometer, test strips, and lancets  A request for a glucose meter, test strips and lancets was sent to the provider for approval. Once your prescriptions are approved by the provider, your prescription will be sent electronically to your pharmacy.     The provider is seeing patients in the office today so orders allow time for your prescriptions to be approved. We recommend calling your pharmacy to verify your medication was received electronically and is ready for pick-up before going to pharmacy.     Issues with insurance coverage?   Check your formulary on your insurance website or call the number on your insurance card to determine the preferred brand glucometer, testing strips, and lancets. Some examples of brands include One Touch, Contour, Accuchek, Freestyle Custer). Notify the diabetes team of your preferred brand and a new order will be placed.    Need to purchase over the counter?   We recommend either the [employer] ReliOn Prime or [facility] Advanced. Please notify the diabetes team if you will be using one of these meters.    Check out online coupons for testing strips:  - One Touch Verio: RX Finder  Automatic Savings for Diabetes Supplies  OneTouch®   - Contour Next: Contour® NEXT Test Strips Discounts  [facility]     How to Check Blood Sugars:  1) Wash your hands. We recommend unscented soap and water to avoid  "potentially inaccurate readings.   Alcohol can cause false elevated glucose readings if not fully dried and may dry your skin with continued use.  2) Gather your glucose meter kit and supplies.  3) Prepare your meter by inserting a new test strip. This will automatically turn on your meter. Use a new test strip each time. Avoid using  testing strips.  4) Prepare your lancing device by inserting the lancet. After the lancet is securely in place, twist off the top to reveal needle. Reattach lancing device top.  5) Once lancing device top is reattached, you are now ready to prick the side of your fingertip to get a blood sample. You can adjust the depth setting as needed. We recommend to start at \"4\".   6) Apply the blood sample to test strip according to instructions.   7) Properly dispose of your lancet. Use a new lancet every time. Make sure your sharp container is: heavy duty plastic, puncture-resistant lid, upright and stable, leak resistant, properly labeled.   8) Record your blood sugar      Instructions   One Touch Verio: OneTouch Verio Flex®  Blood Glucose Meter  OneTouch®   Contour Next: Instructional Videos - [facility]     Frequency: 4 Times Daily     Timing:   Fasting   Test when you wake up before you have anything to eat or drink  At least 8hrs but no more than 10hrs from your bedtime snack  Exceeding 10hrs may result in inaccurate readings  2 hrs after the first bite of your meal (breakfast, lunch, dinner) **do not test for snacks    Goals:    Fasting  60-95   1 Hour   After Meals <140   2 Hours   After Meals <120   *please inform member of diabetes team if you begin testing 1hr blood sugars    REPORTING BLOOD SUGARS:   Please only pick ONE way to report to our team. Choose the best option for you.     Pets are family too Blood Glucose Flow sheet under \"Track my Health\"   Remember to click \"save\" for your readings to automatically upload into Epic.   Pets are family too Message with " Attachment(s)  Send a picture of a written blood sugar log   To: GODFREY Nayak (Medical Question - Non Urgent)  You may include up to 3 images per message  Leave Voicemail at [phone]   Include: Name, Date of Birth, and Date + Blood Sugars    The diabetes team will review your blood sugar log weekly till delivery.             MEAL PLAN AND DIET INSTRUCTIONS    *Carbohydrates: Sources of food that increase your blood sugar (include: starches, fruits, milk, desserts, starchy vegetables such as corn, peas, squash)    Meal Plan (3 Meals and 3 Snacks)  Outlines grams of carbohydrates to have at each meal and snack  Minimum Carbohydrate Intake Daily (avoid ketosis): 175g *to be distributed throughout day as instructed in meal plan  Include Protein at Every Meal and Snack  Eat all 3 meals and 3 snacks  Eating every 2 to 3.5 hours during the day.   Preferably at the same times every day.   Avoid going long periods of time without eating.        Be sure to have bedtime snack that includes at least 30 grams of carbohydrates and 2 ounces (14 grams) of protein.  Refer to Food Lists in Booklet (pages 15-17)   Each food listed is equal to 15g (1 Carbohydrate Serving)  Read Food Label   Check Total Carbohydrate  15g = 1 Carbohydrate Serving  Check Serving Size!   The total carbohydrate amount listed is based on 1 serving size  Be careful as many items contain more than one serving per package  Check Total Sugars  Choose items with <15g per serving of total sugar    Exercise:  If ok by your OB try adding a 20-30 minute walk after dinner to help keep fasting blood sugar within range.  Try incorporating at least 10 minutes of walking after each meal  Resource: Exercise During Pregnancy  ACOG     Additional Information: (to be reviewed at class 2)  Continue to follow up with your OB and MFM providers as recommended.  Always have glucose available for hypoglycemia, use 15 by 15 rule. (Page 29 in booklet)  While sick or feeling  ill, follow our sick day guidelines in our GDM booklet. (Page 28 in booklet)  For travel and dining out tips refer to your GDM booklet. See attachment (Page 31 in booklet)    Class 2 Information: Approximately 1 week following Class 1  Bring 3 Day Food Log (everything you eat and drink for each meal and snack) or write down meals associated with elevated after meal blood sugars  Will review diet more in depth and provide feedback     Remember: Importance of maintaining tight control of blood sugars during pregnancy = decrease risk factors including fetal macrosomia; birth injury; risk of ; polyhydramnios; pre-term labor; pre-eclampsia;  hypoglycemia; jaundice and stillbirth.    Any questions give us a call at [phone] or send us a FiberLightt message to GODFREY Nayak.     Aleena Hernández RD  Diabetes Educator   The Diabetes and Pregnancy Program  At [facility] - Maternal Fetal Medicine

## 2025-03-18 NOTE — ASSESSMENT & PLAN NOTE
Lab Results   Component Value Date    HGBA1C 5.8 (H) 08/21/2024     Orders:    Ambulatory Referral to Maternal Fetal Medicine    Health system glucose flowsheet

## 2025-03-21 LAB
DME PARACHUTE DELIVERY DATE ACTUAL: NORMAL
DME PARACHUTE DELIVERY DATE REQUESTED: NORMAL
DME PARACHUTE ITEM DESCRIPTION: NORMAL
DME PARACHUTE ORDER STATUS: NORMAL
DME PARACHUTE SUPPLIER NAME: NORMAL
DME PARACHUTE SUPPLIER PHONE: NORMAL

## 2025-03-25 ENCOUNTER — TELEMEDICINE (OUTPATIENT)
Dept: PERINATAL CARE | Facility: CLINIC | Age: 40
End: 2025-03-25
Attending: OBSTETRICS & GYNECOLOGY
Payer: COMMERCIAL

## 2025-03-25 DIAGNOSIS — O24.410 DIET CONTROLLED GESTATIONAL DIABETES MELLITUS (GDM) IN THIRD TRIMESTER: Primary | ICD-10-CM

## 2025-03-25 DIAGNOSIS — Z3A.30 30 WEEKS GESTATION OF PREGNANCY: ICD-10-CM

## 2025-03-25 PROCEDURE — G0109 DIAB MANAGE TRN IND/GROUP: HCPCS

## 2025-03-25 NOTE — PROGRESS NOTES
"CLASS 2 - Group  (virtual visit)    Thank you for referring your patient to [facility] Maternal Fetal Medicine Diabetes and Pregnancy Program.     Teri Mckinney is a  39 y.o. female who presents today unaccompanied for Virtual Regular Visit, Patient Education, and Gestational Diabetes  Patient is at 30w5d gestation, Estimated Date of Delivery: 5/29/25.     Visit Diagnosis:  Encounter Diagnosis     ICD-10-CM    1. Diet controlled gestational diabetes mellitus (GDM) in third trimester  O24.410       2. 30 weeks gestation of pregnancy  Z3A.30          Reviewed and updated the following from patients medical record: PMH, Problem List, Allergies, and Current Medications.    Labs  GDM LABS: See Class 1 Note    A1C:  Lab Results   Component Value Date/Time    HGBA1C 5.8 (H) 08/21/2024 05:33 PM    HGBA1C 5.8 (H) 05/18/2023 09:58 AM    HGBA1C 6.2 (H) 08/13/2022 09:20 AM    HGBA1C 6.9 (H) 08/19/2015 09:22 AM        Labs Ordered This Visit: None    Current Medications:    Current Outpatient Medications:     aspirin (Aspirin 81) 81 mg EC tablet, Take 162 mg by mouth daily, Disp: , Rfl:     Blood Glucose Monitoring Suppl (Contour Next EZ) w/Device KIT, Test x4 Daily or as instructed, Disp: 1 kit, Rfl: 0    calcium carbonate (Tums) 500 mg chewable tablet, Chew 1 tablet daily, Disp: , Rfl:     choline fenofibrate (TRILIPIX) 135 MG capsule, Take 265 mg by mouth daily, Disp: , Rfl:     Contour Next Test test strip, Test 4 Times Daily or as instructed, Disp: 100 strip, Rfl: 3    docusate sodium (COLACE) 100 mg capsule, Take 100 mg by mouth 2 (two) times a day, Disp: , Rfl:     Microlet Lancets MISC, Use 4 a Day or as instructed, Disp: 100 each, Rfl: 3    Prenatal Vit-Fe Fumarate-FA (PRENATAL PO), Take by mouth, Disp: , Rfl:      Anthropometrics:  Ht Readings from Last 1 Encounters:   02/07/25 5' 2\" (1.575 m)      Wt Readings from Last 3 Encounters:   03/13/25 120 kg (265 lb)   02/13/25 120 kg (265 lb)   02/07/25 121 kg (266 lb)    "     Pre-Gravid Wt Pre-Gravid BMI TWG   114 kg (252 lb 3.2 oz) 46.12 5.806 kg (12 lb 12.8 oz)     Total Pregnancy Weight Gain Recommendations: 5 kg (11 lb)-9 kg (19 lb)   Current Wt Status Compared to Recommendations: Within Range -- Continue recommended rate of weight gain based on pre-pregnancy BMI till delivery    Most Recent Ultrasound Results:  Findings: NML Growth/TONYA    BLOOD GLUCOSE MONITORING:   Timing/Frequency of SMB x per day (Fasting, 2 hour after start of each meal)  Goals: (Fasting) 60-95mg/dL // (2hr PP) <120mg/dL  Method of Reporting Blood Sugars: [employer] Glucose Flowsheet    BG LOG:         Review of Blood Glucose Log:   FBG =  Two elevated though not consistently elevated above goal.  Post-Prandial BG = Well controlled    MEAL PLAN   1800 calorie (CHO: 45-15-45-15-45-30) (PRO:2-1-3-1-3-2)    Reinforced Diet Instructions:  Individualized meal plan.   Importance of consistent carbohydrate intake via 3 meals and 3 snacks per day   Importance of protein as it relates to blood glucose control.   Encouraged  patient to eat every 2.0-3.5 hours while awake  Encouraged patient to go no longer than 8-10 hours fasting overnight until first meal of the day.  Provided suggested meal/snack options to increase nutrition and maintain consistent meal and snack intakes.    Physical Activity:    Reviewed w/ Pt:   Benefits of physical activity to optimize blood glucose control, encouraged activity at patient is physically able.   Instructed pt to always consult a physician prior to starting an exercise program.   Recommend 20-30 minutes daily.    Additional Topics Reviewed:    Medications: (reviewed options available with pt)  Discussed if blood sugars are not within normal range with meal planning and exercise  Reviewed medication such as metformin and/or basal/bolus insulin may be needed for better glucose control  Maternal-Fetal Testing:   Ultrasounds: growth scans every 4 weeks.  If anti-diabetic medication  "started, pt should complete NST x2 weekly and TONYA x 1 weekly starting at 32nd week GA unless otherwise instructed per M physician.  Sick day Guidelines:   Advised that sickness will raise blood sugar   If blood sugar is > 160 mg/dL twice in one day call doctor  If on diabetes medications, continue as instructed   If unable to consume normal meal plan, instructed to remain well hydrated   Hypoglycemia & Treatment Guidelines:  Reviewed what hypoglycemia is, signs and symptoms, and how to treat via the 15:15 rule.  Post-Partum Guidelines:  Completion of 75 gm CHO 2 hr gtt at 6 weeks post-partum to check for Type 2 DM diagnosis  Breastfeeding Guidelines:  Continue GDM meal plan plus additional 350-500 calories daily  Examples of protein and carbohydrate snacks provided.  Stay hydrated by drinking 8-10 (8 oz.) fluids daily.  Dining Out & Travel Guidelines:  Patient advised to be prepared with extra diabetes supplies, medications, and snacks, as well as sticking to the same time schedule and portions eaten at home for meals and snacks.    Patient Stated Goal: \"I will report my blood sugar values to the diabetes educators each week\"  Goal Assessment: On track    Diabetes Self Management Support Plan outside of ongoing care: Spouse/Family    Barriers to Learning/Change: No Barriers  Expected Compliance: good    Date to report blood sugars: Weekly   Follow up:  Return for per review of weekly blood sugar log.     Begin Time: 1:00pm  End Time: 2:00pm    It was a pleasure working with them today. Please feel free to call ([phone]) with any questions or concerns.    Alice Hayden RD   Diabetes Educator  [facility] Maternal Fetal Medicine  Diabetes and Pregnancy Program  [address] [place] [address], [address]  [place] [address]      Virtual Regular VisitName: Teri Faye      : 1985      MRN: [id number]  Encounter Provider: Alice Hayden RD  Encounter Date: 3/25/2025   Encounter department: [facility]  " [facility] [place]  :  Assessment & Plan  Diet controlled gestational diabetes mellitus (GDM) in third trimester    Lab Results   Component Value Date    HGBA1C 5.8 (H) 08/21/2024          30 weeks gestation of pregnancy         Diet controlled gestational diabetes mellitus (GDM) in third trimester    Lab Results   Component Value Date    HGBA1C 5.8 (H) 08/21/2024          30 weeks gestation of pregnancy               History of Present Illness     HPI  Review of Systems    Objective   LMP 08/19/2024     Physical Exam    Administrative Statements   Encounter provider Alice Hayden RD    The Patient is located at Home and in the following state in which I hold an active license [place].    The patient was identified by name and date of birth. Teri Mckinney was informed that this is a telemedicine visit and that the visit is being conducted through the [employer] platform. She agrees to proceed..  My office door was closed. No one else was in the room.  She acknowledged consent and understanding of privacy and security of the video platform. The patient has agreed to participate and understands they can discontinue the visit at any time.    I have spent a total time of 60 minutes in caring for this patient on the day of the visit/encounter

## 2025-03-28 ENCOUNTER — ROUTINE PRENATAL (OUTPATIENT)
Dept: OBGYN CLINIC | Facility: CLINIC | Age: 40
End: 2025-03-28
Payer: COMMERCIAL

## 2025-03-28 VITALS — BODY MASS INDEX: 48.83 KG/M2 | SYSTOLIC BLOOD PRESSURE: 122 MMHG | WEIGHT: 267 LBS | DIASTOLIC BLOOD PRESSURE: 82 MMHG

## 2025-03-28 DIAGNOSIS — O24.414 INSULIN CONTROLLED GESTATIONAL DIABETES MELLITUS (GDM) IN THIRD TRIMESTER: ICD-10-CM

## 2025-03-28 DIAGNOSIS — Z34.03 ENCOUNTER FOR SUPERVISION OF NORMAL FIRST PREGNANCY IN THIRD TRIMESTER: Primary | ICD-10-CM

## 2025-03-28 DIAGNOSIS — Z3A.31 31 WEEKS GESTATION OF PREGNANCY: ICD-10-CM

## 2025-03-28 LAB
SL AMB  POCT GLUCOSE, UA: NEGATIVE
SL AMB POCT URINE PROTEIN: POSITIVE

## 2025-03-28 PROCEDURE — 81002 URINALYSIS NONAUTO W/O SCOPE: CPT | Performed by: STUDENT IN AN ORGANIZED HEALTH CARE EDUCATION/TRAINING PROGRAM

## 2025-03-28 PROCEDURE — PNV: Performed by: STUDENT IN AN ORGANIZED HEALTH CARE EDUCATION/TRAINING PROGRAM

## 2025-03-28 NOTE — ASSESSMENT & PLAN NOTE
-precautions reviewed  -s/p Tdap vaccine   -prepregnancy BMI 46 with goal weight gain <20#: TWG = 14#

## 2025-03-28 NOTE — PROGRESS NOTES
Pt is here for routine ob visit   No concerns at this time  Urine +1 protein/neg glucose   No LOF,VB  +[place] Contractions  +FM   UTD flu/tdap   Delivery consent signed at previous visit

## 2025-03-28 NOTE — PROGRESS NOTES
39 y.o.  at 31w1d, here for routine OB visit. Feeling well overall, but with intermittent abdominal tightening.     Good FM. Denies LOF, VB, contractions. Denies dysuria, hematuria.      Problem   31 Weeks Gestation of Pregnancy       Problem List Items Addressed This Visit          Endocrine    Gestational diabetes mellitus (GDM) in third trimester       Obstetrics/Gynecology    31 weeks gestation of pregnancy    -precautions reviewed  -s/p Tdap vaccine   -prepregnancy BMI 46 with goal weight gain <20#: TWG = 14#           Other Visit Diagnoses         Encounter for supervision of normal first pregnancy in third trimester    -  Primary    Relevant Orders    POCT urine dip

## 2025-03-29 ENCOUNTER — APPOINTMENT (OUTPATIENT)
Dept: LAB | Facility: MEDICAL CENTER | Age: 40
End: 2025-03-29
Payer: COMMERCIAL

## 2025-03-29 DIAGNOSIS — Z3A.29 29 WEEKS GESTATION OF PREGNANCY: ICD-10-CM

## 2025-03-29 DIAGNOSIS — O24.419 GESTATIONAL DIABETES MELLITUS (GDM) IN THIRD TRIMESTER, GESTATIONAL DIABETES METHOD OF CONTROL UNSPECIFIED: ICD-10-CM

## 2025-03-29 LAB
ALBUMIN SERPL BCG-MCNC: 3.1 G/DL (ref 3.5–5)
ALP SERPL-CCNC: 94 U/L (ref 34–104)
ALT SERPL W P-5'-P-CCNC: 27 U/L (ref 7–52)
ANION GAP SERPL CALCULATED.3IONS-SCNC: 7 MMOL/L (ref 4–13)
AST SERPL W P-5'-P-CCNC: 20 U/L (ref 13–39)
BILIRUB SERPL-MCNC: 0.3 MG/DL (ref 0.2–1)
BUN SERPL-MCNC: 12 MG/DL (ref 5–25)
CALCIUM ALBUM COR SERPL-MCNC: 10.4 MG/DL (ref 8.3–10.1)
CALCIUM SERPL-MCNC: 9.7 MG/DL (ref 8.4–10.2)
CHLORIDE SERPL-SCNC: 104 MMOL/L (ref 96–108)
CO2 SERPL-SCNC: 25 MMOL/L (ref 21–32)
CREAT SERPL-MCNC: 0.72 MG/DL (ref 0.6–1.3)
EST. AVERAGE GLUCOSE BLD GHB EST-MCNC: 126 MG/DL
GFR SERPL CREATININE-BSD FRML MDRD: 105 ML/MIN/1.73SQ M
GLUCOSE P FAST SERPL-MCNC: 96 MG/DL (ref 65–99)
HBA1C MFR BLD: 6 %
POTASSIUM SERPL-SCNC: 4.8 MMOL/L (ref 3.5–5.3)
PROT SERPL-MCNC: 5.8 G/DL (ref 6.4–8.4)
SODIUM SERPL-SCNC: 136 MMOL/L (ref 135–147)

## 2025-03-29 PROCEDURE — 36415 COLL VENOUS BLD VENIPUNCTURE: CPT

## 2025-03-29 PROCEDURE — 83036 HEMOGLOBIN GLYCOSYLATED A1C: CPT

## 2025-03-29 PROCEDURE — 80053 COMPREHEN METABOLIC PANEL: CPT

## 2025-03-30 PROBLEM — E66.01 SEVERE OBESITY DUE TO EXCESS CALORIES AFFECTING PREGNANCY IN THIRD TRIMESTER (HCC): Status: ACTIVE | Noted: 2025-03-30

## 2025-03-30 PROBLEM — O99.213 SEVERE OBESITY DUE TO EXCESS CALORIES AFFECTING PREGNANCY IN THIRD TRIMESTER (HCC): Status: ACTIVE | Noted: 2025-03-30

## 2025-03-30 PROBLEM — O09.513 AMA (ADVANCED MATERNAL AGE) PRIMIGRAVIDA 35+, THIRD TRIMESTER: Status: ACTIVE | Noted: 2025-03-30

## 2025-03-30 PROBLEM — O09.813 PREGNANCY RESULTING FROM IN VITRO FERTILIZATION IN THIRD TRIMESTER: Status: ACTIVE | Noted: 2025-03-30

## 2025-03-30 PROBLEM — Z3A.32 32 WEEKS GESTATION OF PREGNANCY: Status: ACTIVE | Noted: 2025-03-30

## 2025-03-31 ENCOUNTER — RESULTS FOLLOW-UP (OUTPATIENT)
Facility: HOSPITAL | Age: 40
End: 2025-03-31

## 2025-04-04 ENCOUNTER — ULTRASOUND (OUTPATIENT)
Dept: PERINATAL CARE | Facility: OTHER | Age: 40
End: 2025-04-04
Payer: COMMERCIAL

## 2025-04-04 VITALS
HEART RATE: 91 BPM | SYSTOLIC BLOOD PRESSURE: 138 MMHG | HEIGHT: 62 IN | WEIGHT: 267 LBS | BODY MASS INDEX: 49.13 KG/M2 | DIASTOLIC BLOOD PRESSURE: 80 MMHG

## 2025-04-04 DIAGNOSIS — Z3A.32 32 WEEKS GESTATION OF PREGNANCY: ICD-10-CM

## 2025-04-04 DIAGNOSIS — Z36.89 ENCOUNTER FOR ULTRASOUND TO ASSESS FETAL GROWTH: ICD-10-CM

## 2025-04-04 DIAGNOSIS — O09.813 PREGNANCY RESULTING FROM IN VITRO FERTILIZATION IN THIRD TRIMESTER: ICD-10-CM

## 2025-04-04 DIAGNOSIS — E66.01 SEVERE OBESITY DUE TO EXCESS CALORIES AFFECTING PREGNANCY IN THIRD TRIMESTER (HCC): ICD-10-CM

## 2025-04-04 DIAGNOSIS — O99.213 SEVERE OBESITY DUE TO EXCESS CALORIES AFFECTING PREGNANCY IN THIRD TRIMESTER (HCC): ICD-10-CM

## 2025-04-04 DIAGNOSIS — O24.410 DIET CONTROLLED GESTATIONAL DIABETES MELLITUS (GDM) IN THIRD TRIMESTER: Primary | ICD-10-CM

## 2025-04-04 DIAGNOSIS — O09.513 AMA (ADVANCED MATERNAL AGE) PRIMIGRAVIDA 35+, THIRD TRIMESTER: ICD-10-CM

## 2025-04-04 PROCEDURE — 76816 OB US FOLLOW-UP PER FETUS: CPT | Performed by: OBSTETRICS & GYNECOLOGY

## 2025-04-04 PROCEDURE — 99213 OFFICE O/P EST LOW 20 MIN: CPT

## 2025-04-04 NOTE — LETTER
April 4, 2025     Jovanna Salas PA-C  [address]  [address]  [place] [address]    Patient: Teri Mckinney   YOB: 1985   Date of Visit: 4/4/2025       Dear Dr. Jovanna Salas PA-C:    Thank you for referring Teri Mckinney to me for evaluation. Below are my notes for this consultation.    If you have questions, please do not hesitate to call me. I look forward to following your patient along with you.         Sincerely,        Yahaira Terrazas MD        CC: No Recipients

## 2025-04-04 NOTE — PROGRESS NOTES
Teri Mckinney is here today for evaluation of fetal weight and amniotic fluid at 32 weeks 1 day. She endorses appropriate fetal movement is overall feeling well.     Ultrasound findings:  A viable wills intrauterine pregnancy is seen. Estimated fetal weight and amniotic fluid are within normal limits for gestational age. No fetal anomalies are visualized on limited survey. Placenta is within normal limits. Pregnancy ultrasound has limitations and is unable to detect all forms of fetal congenital abnormalities.  The inaccuracy in the EFW can be off by 1 lb either way in the third trimester.    Problem List:  1.  IVF pregnancy  2.  A1 GDM  3.  Advanced maternal age of 39 years old  4.  Obesity class III    My recommendations are as follows:    1.  Teri will return in 4 weeks for a follow-up growth ultrasound due to the indications of A1GDM.  Her blood sugars were reviewed prior to today's visit and are overall well-controlled.    2.  She will begin weekly antepartum fetal surveillance at 34 weeks due to the indications of AMA, IVF pregnancy, and class III obesity.    Split-shared decision-making between GODFREY Mccarthy and myself was utilized, with the majority of the time spent by KELVIN Mccarthy.  Medical decision-making for this encounter was low (diagnosis low, data low and risk low). I reviewed the ultrasound pictures and recommended the medical decision making transcribed in the care of this patient.    I reviewed the ultrasound pictures and recommended the medical decision making transcribed in the care of this patient.    Yahaira Terrazas M.D.

## 2025-04-04 NOTE — LETTER
April 4, 2025     Jovanna Salas PA-C  [address]  [address]  [place] [address]    Patient: Teri Mckinney   YOB: 1985   Date of Visit: 4/4/2025       Dear Dr. Jovanna Salas PA-C:    Thank you for referring Teri Mckinney to me for evaluation. Below are my notes for this consultation.    If you have questions, please do not hesitate to call me. I look forward to following your patient along with you.         Sincerely,        Yahaira Terrazas MD        CC: No Recipients    Yahaira Terrazas MD  4/4/2025  6:12 PM  Sign when Signing Visit  Teri Mckinney is here today for evaluation of fetal weight and amniotic fluid at 32 weeks 1 day. She endorses appropriate fetal movement is overall feeling well.     Ultrasound findings:  A viable wills intrauterine pregnancy is seen. Estimated fetal weight and amniotic fluid are within normal limits for gestational age. No fetal anomalies are visualized on limited survey. Placenta is within normal limits. Pregnancy ultrasound has limitations and is unable to detect all forms of fetal congenital abnormalities.  The inaccuracy in the EFW can be off by 1 lb either way in the third trimester.    Problem List:  1.  IVF pregnancy  2.  A1 GDM  3.  Advanced maternal age of 39 years old  4.  Obesity class III    My recommendations are as follows:    1.  Teri will return in 4 weeks for a follow-up growth ultrasound due to the indications of A1GDM.  Her blood sugars were reviewed prior to today's visit and are overall well-controlled.    2.  She will begin weekly antepartum fetal surveillance at 34 weeks due to the indications of AMA, IVF pregnancy, and class III obesity.    Split-shared decision-making between GODFREY Mccarthy and myself was utilized, with the majority of the time spent by KELVIN Mccarthy.  Medical decision-making for this encounter was low (diagnosis low, data low and risk low). I reviewed the ultrasound pictures and recommended  the medical decision making transcribed in the care of this patient.    I reviewed the ultrasound pictures and recommended the medical decision making transcribed in the care of this patient.    Yahaira Terrazas M.D.

## 2025-04-07 ENCOUNTER — CLINICAL SUPPORT (OUTPATIENT)
Dept: POSTPARTUM | Facility: CLINIC | Age: 40
End: 2025-04-07

## 2025-04-07 DIAGNOSIS — Z32.2 ENCOUNTER FOR CHILDBIRTH INSTRUCTION: Primary | ICD-10-CM

## 2025-04-10 ENCOUNTER — ROUTINE PRENATAL (OUTPATIENT)
Dept: OBGYN CLINIC | Facility: CLINIC | Age: 40
End: 2025-04-10
Payer: COMMERCIAL

## 2025-04-10 VITALS
SYSTOLIC BLOOD PRESSURE: 132 MMHG | DIASTOLIC BLOOD PRESSURE: 84 MMHG | BODY MASS INDEX: 49.2 KG/M2 | HEART RATE: 98 BPM | OXYGEN SATURATION: 97 % | WEIGHT: 269 LBS

## 2025-04-10 DIAGNOSIS — Z3A.33 33 WEEKS GESTATION OF PREGNANCY: ICD-10-CM

## 2025-04-10 DIAGNOSIS — O24.414 INSULIN CONTROLLED GESTATIONAL DIABETES MELLITUS (GDM) IN THIRD TRIMESTER: ICD-10-CM

## 2025-04-10 DIAGNOSIS — O09.813 PREGNANCY RESULTING FROM IN VITRO FERTILIZATION IN THIRD TRIMESTER: ICD-10-CM

## 2025-04-10 DIAGNOSIS — O99.213 SEVERE OBESITY DUE TO EXCESS CALORIES AFFECTING PREGNANCY IN THIRD TRIMESTER (HCC): ICD-10-CM

## 2025-04-10 DIAGNOSIS — O09.513 AMA (ADVANCED MATERNAL AGE) PRIMIGRAVIDA 35+, THIRD TRIMESTER: ICD-10-CM

## 2025-04-10 DIAGNOSIS — E66.01 SEVERE OBESITY DUE TO EXCESS CALORIES AFFECTING PREGNANCY IN THIRD TRIMESTER (HCC): ICD-10-CM

## 2025-04-10 DIAGNOSIS — O24.410 DIET CONTROLLED GESTATIONAL DIABETES MELLITUS (GDM) IN THIRD TRIMESTER: ICD-10-CM

## 2025-04-10 DIAGNOSIS — Z34.03 ENCOUNTER FOR SUPERVISION OF NORMAL FIRST PREGNANCY IN THIRD TRIMESTER: Primary | ICD-10-CM

## 2025-04-10 LAB
SL AMB  POCT GLUCOSE, UA: ABNORMAL
SL AMB POCT URINE PROTEIN: 30

## 2025-04-10 PROCEDURE — 81002 URINALYSIS NONAUTO W/O SCOPE: CPT | Performed by: PHYSICIAN ASSISTANT

## 2025-04-10 PROCEDURE — PNV: Performed by: PHYSICIAN ASSISTANT

## 2025-04-10 NOTE — ASSESSMENT & PLAN NOTE
Teri  is a 39 y.o.  @33w0d who presents for routine prenatal visit.      28 wk labs - completed - GDM. Otherwise normal.   TDAP - received   Delivery consent - on file.   To return birth plan.  Plans to breastfeed. Pump - received. Classes scheduled.   Perineal massage - encouraged  PP contraception - reviewed recommended interval between pregnancy and options while BF. Conceived by IVF - unsure that they will utilize     TWG 7.62 kg (16 lb 12.8 oz)    Reports good fetal movement.  Denies LOF, vaginal bleeding, regular uterine contractions, cramping, headaches or visual changes.      Reviewed PTL/Labor precautions and FKC.

## 2025-04-10 NOTE — ASSESSMENT & PLAN NOTE
Scheduled for growth scan and to begin APFS @ 34 weeks.   Discussed delivery time frame. Will consider IOL.  BS remain well controlled with diet.      Lab Results   Component Value Date    HGBA1C 6.0 (H) 03/29/2025

## 2025-04-10 NOTE — PROGRESS NOTES
Prenatal visit  GA 33w 0d  Denies any vaginal bleeding, Leaking of fluid.   +Sony Callejas.  Normal Fetal movement.    28 wk labs completed. GDM, following diabetic pathways.   S/p Tdap & Flu vaccines  Delivery consent signed on 03/13/2025  Breast pump received.   GBS collect at 36 weeks  -EPDS scale 4    Patient reports swelling in her calves, ankles, feet, and hands.   
Problem List Items Addressed This Visit       Diet controlled gestational diabetes mellitus (GDM) in third trimester    Scheduled for growth scan and to begin APFS @ 34 weeks.   Discussed delivery time frame. Will consider IOL.  BS remain well controlled with diet.      Lab Results   Component Value Date    HGBA1C 6.0 (H) 2025            AMA (advanced maternal age) primigravida 35+, third trimester    Severe obesity due to excess calories affecting pregnancy in third trimester (HCC)    TWG 16 lbs. Following with diabetes program and showing good control with diet.          Pregnancy resulting from in vitro fertilization in third trimester    33 weeks gestation of pregnancy    Teri  is a 39 y.o.  @33w0d who presents for routine prenatal visit.      28 wk labs - completed - GDM. Otherwise normal.   TDAP - received   Delivery consent - on file.   To return birth plan.  Plans to breastfeed. Pump - received. Classes scheduled.   Perineal massage - encouraged  PP contraception - reviewed recommended interval between pregnancy and options while BF. Conceived by IVF - unsure that they will utilize     TWG 7.62 kg (16 lb 12.8 oz)    Reports good fetal movement.  Denies LOF, vaginal bleeding, regular uterine contractions, cramping, headaches or visual changes.      Reviewed PTL/Labor precautions and FKC.           Relevant Orders    POCT urine dip (Completed)     Other Visit Diagnoses         Encounter for supervision of normal first pregnancy in third trimester    -  Primary    Relevant Orders    POCT urine dip (Completed)      Insulin controlled gestational diabetes mellitus (GDM) in third trimester                
normal

## 2025-04-14 ENCOUNTER — CLINICAL SUPPORT (OUTPATIENT)
Dept: POSTPARTUM | Facility: CLINIC | Age: 40
End: 2025-04-14

## 2025-04-14 DIAGNOSIS — Z32.2 ENCOUNTER FOR CHILDBIRTH INSTRUCTION: Primary | ICD-10-CM

## 2025-04-16 PROBLEM — Z3A.34 34 WEEKS GESTATION OF PREGNANCY: Status: ACTIVE | Noted: 2025-04-16

## 2025-04-17 ENCOUNTER — NURSE TRIAGE (OUTPATIENT)
Dept: OTHER | Facility: OTHER | Age: 40
End: 2025-04-17

## 2025-04-17 ENCOUNTER — HOSPITAL ENCOUNTER (INPATIENT)
Facility: HOSPITAL | Age: 40
LOS: 7 days | Discharge: HOME | End: 2025-04-25
Attending: STUDENT IN AN ORGANIZED HEALTH CARE EDUCATION/TRAINING PROGRAM | Admitting: STUDENT IN AN ORGANIZED HEALTH CARE EDUCATION/TRAINING PROGRAM
Payer: COMMERCIAL

## 2025-04-17 DIAGNOSIS — Z98.891 S/P PRIMARY LOW TRANSVERSE C-SECTION: ICD-10-CM

## 2025-04-17 DIAGNOSIS — O09.813 PREGNANCY RESULTING FROM IN VITRO FERTILIZATION IN THIRD TRIMESTER: ICD-10-CM

## 2025-04-17 DIAGNOSIS — E66.01 SEVERE OBESITY DUE TO EXCESS CALORIES AFFECTING PREGNANCY IN THIRD TRIMESTER (HCC): ICD-10-CM

## 2025-04-17 DIAGNOSIS — Z3A.34 34 WEEKS GESTATION OF PREGNANCY: Primary | ICD-10-CM

## 2025-04-17 DIAGNOSIS — O61.9 FAILED INDUCTION OF LABOR, UNSPECIFIED TYPE: ICD-10-CM

## 2025-04-17 DIAGNOSIS — O09.513 AMA (ADVANCED MATERNAL AGE) PRIMIGRAVIDA 35+, THIRD TRIMESTER: ICD-10-CM

## 2025-04-17 DIAGNOSIS — O99.213 SEVERE OBESITY DUE TO EXCESS CALORIES AFFECTING PREGNANCY IN THIRD TRIMESTER (HCC): ICD-10-CM

## 2025-04-18 PROBLEM — O14.10 SEVERE PREECLAMPSIA: Status: ACTIVE | Noted: 2025-04-18

## 2025-04-18 LAB
ABO GROUP BLD: NORMAL
ALBUMIN SERPL BCG-MCNC: 2.8 G/DL (ref 3.5–5)
ALBUMIN SERPL BCG-MCNC: 2.9 G/DL (ref 3.5–5)
ALBUMIN SERPL BCG-MCNC: 3 G/DL (ref 3.5–5)
ALP SERPL-CCNC: 109 U/L (ref 34–104)
ALP SERPL-CCNC: 113 U/L (ref 34–104)
ALP SERPL-CCNC: 118 U/L (ref 34–104)
ALT SERPL W P-5'-P-CCNC: 13 U/L (ref 7–52)
ALT SERPL W P-5'-P-CCNC: 15 U/L (ref 7–52)
ALT SERPL W P-5'-P-CCNC: 15 U/L (ref 7–52)
ANION GAP SERPL CALCULATED.3IONS-SCNC: 11 MMOL/L (ref 4–13)
ANION GAP SERPL CALCULATED.3IONS-SCNC: 7 MMOL/L (ref 4–13)
ANION GAP SERPL CALCULATED.3IONS-SCNC: 8 MMOL/L (ref 4–13)
AST SERPL W P-5'-P-CCNC: 17 U/L (ref 13–39)
AST SERPL W P-5'-P-CCNC: 17 U/L (ref 13–39)
AST SERPL W P-5'-P-CCNC: 19 U/L (ref 13–39)
BILIRUB SERPL-MCNC: 0.25 MG/DL (ref 0.2–1)
BILIRUB SERPL-MCNC: 0.26 MG/DL (ref 0.2–1)
BILIRUB SERPL-MCNC: 0.32 MG/DL (ref 0.2–1)
BLD GP AB SCN SERPL QL: NEGATIVE
BUN SERPL-MCNC: 15 MG/DL (ref 5–25)
BUN SERPL-MCNC: 17 MG/DL (ref 5–25)
BUN SERPL-MCNC: 18 MG/DL (ref 5–25)
CALCIUM ALBUM COR SERPL-MCNC: 10.3 MG/DL (ref 8.3–10.1)
CALCIUM ALBUM COR SERPL-MCNC: 8.7 MG/DL (ref 8.3–10.1)
CALCIUM ALBUM COR SERPL-MCNC: 9.5 MG/DL (ref 8.3–10.1)
CALCIUM SERPL-MCNC: 7.8 MG/DL (ref 8.4–10.2)
CALCIUM SERPL-MCNC: 8.7 MG/DL (ref 8.4–10.2)
CALCIUM SERPL-MCNC: 9.3 MG/DL (ref 8.4–10.2)
CHLORIDE SERPL-SCNC: 106 MMOL/L (ref 96–108)
CHLORIDE SERPL-SCNC: 106 MMOL/L (ref 96–108)
CHLORIDE SERPL-SCNC: 107 MMOL/L (ref 96–108)
CO2 SERPL-SCNC: 18 MMOL/L (ref 21–32)
CO2 SERPL-SCNC: 20 MMOL/L (ref 21–32)
CO2 SERPL-SCNC: 22 MMOL/L (ref 21–32)
CREAT SERPL-MCNC: 0.78 MG/DL (ref 0.6–1.3)
CREAT SERPL-MCNC: 0.81 MG/DL (ref 0.6–1.3)
CREAT SERPL-MCNC: 0.93 MG/DL (ref 0.6–1.3)
CREAT UR-MCNC: 28.3 MG/DL
ERYTHROCYTE [DISTWIDTH] IN BLOOD BY AUTOMATED COUNT: 13.5 % (ref 11.6–15.1)
ERYTHROCYTE [DISTWIDTH] IN BLOOD BY AUTOMATED COUNT: 13.6 % (ref 11.6–15.1)
ERYTHROCYTE [DISTWIDTH] IN BLOOD BY AUTOMATED COUNT: 13.6 % (ref 11.6–15.1)
GFR SERPL CREATININE-BSD FRML MDRD: 77 ML/MIN/1.73SQ M
GFR SERPL CREATININE-BSD FRML MDRD: 91 ML/MIN/1.73SQ M
GFR SERPL CREATININE-BSD FRML MDRD: 95 ML/MIN/1.73SQ M
GLUCOSE SERPL-MCNC: 100 MG/DL (ref 65–140)
GLUCOSE SERPL-MCNC: 102 MG/DL (ref 65–140)
GLUCOSE SERPL-MCNC: 102 MG/DL (ref 65–140)
GLUCOSE SERPL-MCNC: 103 MG/DL (ref 65–140)
GLUCOSE SERPL-MCNC: 103 MG/DL (ref 65–140)
GLUCOSE SERPL-MCNC: 105 MG/DL (ref 65–140)
GLUCOSE SERPL-MCNC: 106 MG/DL (ref 65–140)
GLUCOSE SERPL-MCNC: 106 MG/DL (ref 65–140)
GLUCOSE SERPL-MCNC: 111 MG/DL (ref 65–140)
GLUCOSE SERPL-MCNC: 111 MG/DL (ref 65–140)
GLUCOSE SERPL-MCNC: 112 MG/DL (ref 65–140)
GLUCOSE SERPL-MCNC: 115 MG/DL (ref 65–140)
GLUCOSE SERPL-MCNC: 116 MG/DL (ref 65–140)
GLUCOSE SERPL-MCNC: 118 MG/DL (ref 65–140)
GLUCOSE SERPL-MCNC: 118 MG/DL (ref 65–140)
GLUCOSE SERPL-MCNC: 123 MG/DL (ref 65–140)
GLUCOSE SERPL-MCNC: 94 MG/DL (ref 65–140)
GLUCOSE SERPL-MCNC: 97 MG/DL (ref 65–140)
HCT VFR BLD AUTO: 34.9 % (ref 34.8–46.1)
HCT VFR BLD AUTO: 35.6 % (ref 34.8–46.1)
HCT VFR BLD AUTO: 38.1 % (ref 34.8–46.1)
HGB BLD-MCNC: 11.9 G/DL (ref 11.5–15.4)
HGB BLD-MCNC: 11.9 G/DL (ref 11.5–15.4)
HGB BLD-MCNC: 12.7 G/DL (ref 11.5–15.4)
MAGNESIUM SERPL-MCNC: 4.9 MG/DL (ref 1.9–2.7)
MAGNESIUM SERPL-MCNC: 6.6 MG/DL (ref 1.9–2.7)
MCH RBC QN AUTO: 29.8 PG (ref 26.8–34.3)
MCH RBC QN AUTO: 30.4 PG (ref 26.8–34.3)
MCH RBC QN AUTO: 30.5 PG (ref 26.8–34.3)
MCHC RBC AUTO-ENTMCNC: 33.3 G/DL (ref 31.4–37.4)
MCHC RBC AUTO-ENTMCNC: 33.4 G/DL (ref 31.4–37.4)
MCHC RBC AUTO-ENTMCNC: 34.1 G/DL (ref 31.4–37.4)
MCV RBC AUTO: 89 FL (ref 82–98)
MCV RBC AUTO: 90 FL (ref 82–98)
MCV RBC AUTO: 91 FL (ref 82–98)
PLATELET # BLD AUTO: 276 THOUSANDS/UL (ref 149–390)
PLATELET # BLD AUTO: 299 THOUSANDS/UL (ref 149–390)
PLATELET # BLD AUTO: 301 THOUSANDS/UL (ref 149–390)
PMV BLD AUTO: 10.7 FL (ref 8.9–12.7)
PMV BLD AUTO: 10.9 FL (ref 8.9–12.7)
PMV BLD AUTO: 11.1 FL (ref 8.9–12.7)
POTASSIUM SERPL-SCNC: 3.9 MMOL/L (ref 3.5–5.3)
POTASSIUM SERPL-SCNC: 4.1 MMOL/L (ref 3.5–5.3)
POTASSIUM SERPL-SCNC: 4.4 MMOL/L (ref 3.5–5.3)
PROT SERPL-MCNC: 5.5 G/DL (ref 6.4–8.4)
PROT SERPL-MCNC: 5.9 G/DL (ref 6.4–8.4)
PROT SERPL-MCNC: 6 G/DL (ref 6.4–8.4)
PROT UR-MCNC: 215 MG/DL
PROT/CREAT UR: 7.6 MG/G{CREAT}
RBC # BLD AUTO: 3.9 MILLION/UL (ref 3.81–5.12)
RBC # BLD AUTO: 3.91 MILLION/UL (ref 3.81–5.12)
RBC # BLD AUTO: 4.26 MILLION/UL (ref 3.81–5.12)
RH BLD: POSITIVE
SODIUM SERPL-SCNC: 135 MMOL/L (ref 135–147)
SPECIMEN EXPIRATION DATE: NORMAL
TREPONEMA PALLIDUM IGG+IGM AB [PRESENCE] IN SERUM OR PLASMA BY IMMUNOASSAY: NORMAL
WBC # BLD AUTO: 12.4 THOUSAND/UL (ref 4.31–10.16)
WBC # BLD AUTO: 9.16 THOUSAND/UL (ref 4.31–10.16)
WBC # BLD AUTO: 9.2 THOUSAND/UL (ref 4.31–10.16)

## 2025-04-18 PROCEDURE — 86850 RBC ANTIBODY SCREEN: CPT

## 2025-04-18 PROCEDURE — 83735 ASSAY OF MAGNESIUM: CPT

## 2025-04-18 PROCEDURE — 80053 COMPREHEN METABOLIC PANEL: CPT

## 2025-04-18 PROCEDURE — 96374 THER/PROPH/DIAG INJ IV PUSH: CPT

## 2025-04-18 PROCEDURE — 85027 COMPLETE CBC AUTOMATED: CPT

## 2025-04-18 PROCEDURE — 96372 THER/PROPH/DIAG INJ SC/IM: CPT

## 2025-04-18 PROCEDURE — 96361 HYDRATE IV INFUSION ADD-ON: CPT

## 2025-04-18 PROCEDURE — 87150 DNA/RNA AMPLIFIED PROBE: CPT

## 2025-04-18 PROCEDURE — 82948 REAGENT STRIP/BLOOD GLUCOSE: CPT

## 2025-04-18 PROCEDURE — NC001 PR NO CHARGE: Performed by: STUDENT IN AN ORGANIZED HEALTH CARE EDUCATION/TRAINING PROGRAM

## 2025-04-18 PROCEDURE — 86901 BLOOD TYPING SEROLOGIC RH(D): CPT

## 2025-04-18 PROCEDURE — 86780 TREPONEMA PALLIDUM: CPT

## 2025-04-18 PROCEDURE — 96360 HYDRATION IV INFUSION INIT: CPT

## 2025-04-18 PROCEDURE — 96375 TX/PRO/DX INJ NEW DRUG ADDON: CPT

## 2025-04-18 PROCEDURE — 96365 THER/PROPH/DIAG IV INF INIT: CPT

## 2025-04-18 PROCEDURE — 86900 BLOOD TYPING SEROLOGIC ABO: CPT

## 2025-04-18 PROCEDURE — 99214 OFFICE O/P EST MOD 30 MIN: CPT

## 2025-04-18 PROCEDURE — 84156 ASSAY OF PROTEIN URINE: CPT

## 2025-04-18 PROCEDURE — 82570 ASSAY OF URINE CREATININE: CPT

## 2025-04-18 RX ORDER — MAGNESIUM SULFATE HEPTAHYDRATE 40 MG/ML
4 INJECTION, SOLUTION INTRAVENOUS ONCE
Status: COMPLETED | OUTPATIENT
Start: 2025-04-18 | End: 2025-04-18

## 2025-04-18 RX ORDER — CALCIUM CARBONATE 500 MG/1
500 TABLET, CHEWABLE ORAL 3 TIMES DAILY PRN
Status: DISCONTINUED | OUTPATIENT
Start: 2025-04-18 | End: 2025-04-19

## 2025-04-18 RX ORDER — LABETALOL HYDROCHLORIDE 5 MG/ML
INJECTION, SOLUTION INTRAVENOUS
Status: COMPLETED
Start: 2025-04-18 | End: 2025-04-18

## 2025-04-18 RX ORDER — DEXTROSE MONOHYDRATE AND SODIUM CHLORIDE 5; .9 G/100ML; G/100ML
50 INJECTION, SOLUTION INTRAVENOUS CONTINUOUS
Status: DISCONTINUED | OUTPATIENT
Start: 2025-04-18 | End: 2025-04-21

## 2025-04-18 RX ORDER — SODIUM CHLORIDE 9 MG/ML
50 INJECTION, SOLUTION INTRAVENOUS CONTINUOUS
Status: DISCONTINUED | OUTPATIENT
Start: 2025-04-18 | End: 2025-04-18

## 2025-04-18 RX ORDER — SODIUM CHLORIDE 9 MG/ML
50 INJECTION, SOLUTION INTRAVENOUS CONTINUOUS
Status: DISCONTINUED | OUTPATIENT
Start: 2025-04-18 | End: 2025-04-21

## 2025-04-18 RX ORDER — LABETALOL HYDROCHLORIDE 5 MG/ML
20 INJECTION, SOLUTION INTRAVENOUS ONCE
Status: COMPLETED | OUTPATIENT
Start: 2025-04-18 | End: 2025-04-18

## 2025-04-18 RX ORDER — SODIUM CHLORIDE, SODIUM LACTATE, POTASSIUM CHLORIDE, CALCIUM CHLORIDE 600; 310; 30; 20 MG/100ML; MG/100ML; MG/100ML; MG/100ML
50 INJECTION, SOLUTION INTRAVENOUS CONTINUOUS
Status: DISCONTINUED | OUTPATIENT
Start: 2025-04-18 | End: 2025-04-18

## 2025-04-18 RX ORDER — SODIUM CHLORIDE 9 MG/ML
125 INJECTION, SOLUTION INTRAVENOUS CONTINUOUS
Status: DISCONTINUED | OUTPATIENT
Start: 2025-04-18 | End: 2025-04-18

## 2025-04-18 RX ORDER — LABETALOL HYDROCHLORIDE 5 MG/ML
40 INJECTION, SOLUTION INTRAVENOUS ONCE
Status: COMPLETED | OUTPATIENT
Start: 2025-04-18 | End: 2025-04-18

## 2025-04-18 RX ORDER — ONDANSETRON 2 MG/ML
4 INJECTION INTRAMUSCULAR; INTRAVENOUS EVERY 6 HOURS PRN
Status: DISCONTINUED | OUTPATIENT
Start: 2025-04-18 | End: 2025-04-21

## 2025-04-18 RX ORDER — BUTORPHANOL TARTRATE 1 MG/ML
1 INJECTION, SOLUTION INTRAMUSCULAR; INTRAVENOUS ONCE
Status: DISCONTINUED | OUTPATIENT
Start: 2025-04-18 | End: 2025-04-18

## 2025-04-18 RX ORDER — PROMETHAZINE HYDROCHLORIDE 25 MG/ML
25 INJECTION, SOLUTION INTRAMUSCULAR; INTRAVENOUS ONCE
Status: DISCONTINUED | OUTPATIENT
Start: 2025-04-18 | End: 2025-04-18

## 2025-04-18 RX ORDER — BETAMETHASONE SODIUM PHOSPHATE AND BETAMETHASONE ACETATE 3; 3 MG/ML; MG/ML
12 INJECTION, SUSPENSION INTRA-ARTICULAR; INTRALESIONAL; INTRAMUSCULAR; SOFT TISSUE EVERY 24 HOURS
Status: DISCONTINUED | OUTPATIENT
Start: 2025-04-18 | End: 2025-04-18

## 2025-04-18 RX ORDER — CALCIUM GLUCONATE 94 MG/ML
1 INJECTION, SOLUTION INTRAVENOUS ONCE AS NEEDED
Status: DISCONTINUED | OUTPATIENT
Start: 2025-04-18 | End: 2025-04-25 | Stop reason: HOSPADM

## 2025-04-18 RX ORDER — BUPIVACAINE HYDROCHLORIDE 2.5 MG/ML
30 INJECTION, SOLUTION EPIDURAL; INFILTRATION; INTRACAUDAL; PERINEURAL ONCE AS NEEDED
Status: DISCONTINUED | OUTPATIENT
Start: 2025-04-18 | End: 2025-04-21

## 2025-04-18 RX ORDER — BETAMETHASONE SODIUM PHOSPHATE AND BETAMETHASONE ACETATE 3; 3 MG/ML; MG/ML
12 INJECTION, SUSPENSION INTRA-ARTICULAR; INTRALESIONAL; INTRAMUSCULAR; SOFT TISSUE EVERY 24 HOURS
Status: COMPLETED | OUTPATIENT
Start: 2025-04-18 | End: 2025-04-19

## 2025-04-18 RX ORDER — NIFEDIPINE 30 MG/1
30 TABLET, EXTENDED RELEASE ORAL DAILY
Status: DISCONTINUED | OUTPATIENT
Start: 2025-04-18 | End: 2025-04-19

## 2025-04-18 RX ORDER — MAGNESIUM SULFATE HEPTAHYDRATE 40 MG/ML
2 INJECTION, SOLUTION INTRAVENOUS ONCE
Status: COMPLETED | OUTPATIENT
Start: 2025-04-18 | End: 2025-04-18

## 2025-04-18 RX ORDER — MAGNESIUM SULFATE HEPTAHYDRATE 40 MG/ML
1 INJECTION, SOLUTION INTRAVENOUS CONTINUOUS
Status: DISCONTINUED | OUTPATIENT
Start: 2025-04-18 | End: 2025-04-22

## 2025-04-18 RX ADMIN — MAGNESIUM SULFATE IN WATER 2 G/HR: 20 INJECTION, SOLUTION INTRAVENOUS at 02:53

## 2025-04-18 RX ADMIN — SODIUM CHLORIDE 50 ML/HR: 0.9 INJECTION, SOLUTION INTRAVENOUS at 04:50

## 2025-04-18 RX ADMIN — BETAMETHASONE SODIUM PHOSPHATE AND BETAMETHASONE ACETATE 12 MG: 3; 3 INJECTION, SUSPENSION INTRA-ARTICULAR; INTRALESIONAL; INTRAMUSCULAR at 04:49

## 2025-04-18 RX ADMIN — LABETALOL HYDROCHLORIDE 20 MG: 5 INJECTION, SOLUTION INTRAVENOUS at 01:23

## 2025-04-18 RX ADMIN — PENICILLIN G POTASSIUM 2.5 MILLION UNITS: 20000000 INJECTION, POWDER, FOR SOLUTION INTRAMUSCULAR; INTRAVENOUS at 13:22

## 2025-04-18 RX ADMIN — NIFEDIPINE 30 MG: 30 TABLET, FILM COATED, EXTENDED RELEASE ORAL at 08:09

## 2025-04-18 RX ADMIN — PENICILLIN G POTASSIUM 2.5 MILLION UNITS: 20000000 INJECTION, POWDER, FOR SOLUTION INTRAMUSCULAR; INTRAVENOUS at 21:03

## 2025-04-18 RX ADMIN — PENICILLIN G POTASSIUM 5 MILLION UNITS: 20000000 INJECTION, POWDER, FOR SOLUTION INTRAMUSCULAR; INTRAVENOUS at 04:50

## 2025-04-18 RX ADMIN — PENICILLIN G POTASSIUM 2.5 MILLION UNITS: 20000000 INJECTION, POWDER, FOR SOLUTION INTRAMUSCULAR; INTRAVENOUS at 09:05

## 2025-04-18 RX ADMIN — SODIUM CHLORIDE, SODIUM LACTATE, POTASSIUM CHLORIDE, AND CALCIUM CHLORIDE 50 ML/HR: .6; .31; .03; .02 INJECTION, SOLUTION INTRAVENOUS at 02:15

## 2025-04-18 RX ADMIN — LABETALOL HYDROCHLORIDE 40 MG: 5 INJECTION, SOLUTION INTRAVENOUS at 01:54

## 2025-04-18 RX ADMIN — DEXTROSE AND SODIUM CHLORIDE 50 ML/HR: 5; .9 INJECTION, SOLUTION INTRAVENOUS at 14:25

## 2025-04-18 RX ADMIN — PENICILLIN G POTASSIUM 2.5 MILLION UNITS: 20000000 INJECTION, POWDER, FOR SOLUTION INTRAMUSCULAR; INTRAVENOUS at 17:12

## 2025-04-18 RX ADMIN — MAGNESIUM SULFATE HEPTAHYDRATE 2 G: 40 INJECTION, SOLUTION INTRAVENOUS at 02:36

## 2025-04-18 RX ADMIN — SODIUM CHLORIDE 0.5 UNITS/HR: 9 INJECTION, SOLUTION INTRAVENOUS at 14:39

## 2025-04-18 RX ADMIN — CALCIUM CARBONATE 500 MG: 500 TABLET, CHEWABLE ORAL at 22:41

## 2025-04-18 RX ADMIN — Medication 25 MCG: at 20:23

## 2025-04-18 RX ADMIN — DEXTROSE AND SODIUM CHLORIDE 25 ML/HR: 5; .9 INJECTION, SOLUTION INTRAVENOUS at 20:18

## 2025-04-18 RX ADMIN — MAGNESIUM SULFATE HEPTAHYDRATE 4 G: 40 INJECTION, SOLUTION INTRAVENOUS at 02:16

## 2025-04-18 RX ADMIN — Medication 50 MCG: at 04:49

## 2025-04-18 RX ADMIN — MAGNESIUM SULFATE IN WATER 2 G/HR: 20 INJECTION, SOLUTION INTRAVENOUS at 12:13

## 2025-04-18 NOTE — PLAN OF CARE
Problem: PAIN - ADULT  Goal: Verbalizes/displays adequate comfort level or baseline comfort level  Description: Interventions:- Encourage patient to monitor pain and request assistance- Assess pain using appropriate pain scale- Administer analgesics based on type and severity of pain and evaluate response- Implement non-pharmacological measures as appropriate and evaluate response- Consider cultural and social influences on pain and pain management- Notify physician/advanced practitioner if interventions unsuccessful or patient reports new pain  Outcome: Progressing     Problem: INFECTION - ADULT  Goal: Absence or prevention of progression during hospitalization  Description: INTERVENTIONS:- Assess and monitor for signs and symptoms of infection- Monitor lab/diagnostic results- Monitor all insertion sites, i.e. indwelling lines, tubes, and drains- Monitor endotracheal if appropriate and nasal secretions for changes in amount and color- [place] appropriate cooling/warming therapies per order- Administer medications as ordered- Instruct and encourage patient and family to use good hand hygiene technique- Identify and instruct in appropriate isolation precautions for identified infection/condition  Outcome: Progressing  Goal: Absence of fever/infection during neutropenic period  Description: INTERVENTIONS:- Monitor WBC  Outcome: Progressing     Problem: SAFETY ADULT  Goal: Patient will remain free of falls  Description: INTERVENTIONS:- Educate patient/family on patient safety including physical limitations- Instruct patient to call for assistance with activity - Consult OT/PT to assist with strengthening/mobility - Keep Call bell within reach- Keep bed low and locked with side rails adjusted as appropriate- Keep care items and personal belongings within reach- Initiate and maintain comfort rounds-   Outcome: Progressing  Goal: Maintain or return to baseline ADL function  Description: INTERVENTIONS:-  Assess  patient's ability to carry out ADLs; assess patient's baseline for ADL function and identify physical deficits which impact ability to perform ADLs (bathing, care of mouth/teeth, toileting, grooming, dressing, etc.)- Assess/evaluate cause of self-care deficits - Assess range of motion- Assess patient's mobility; develop plan if impaired- Assess patient's need for assistive devices and provide as appropriate- Encourage maximum independence but intervene and supervise when necessary- Involve family in performance of ADLs- Assess for home care needs following discharge - Consider OT consult to assist with ADL evaluation and planning for discharge- Provide patient education as appropriate  Outcome: Progressing  Goal: Maintains/Returns to pre admission functional level  Description: INTERVENTIONS:- Perform AM-PAC 6 Click Basic Mobility/ Daily Activity assessment daily.- Set and communicate daily mobility goal to care team and patient/family/caregiver. -  Outcome: Progressing     Problem: Knowledge Deficit  Goal: Patient/family/caregiver demonstrates understanding of disease process, treatment plan, medications, and discharge instructions  Description: Complete learning assessment and assess knowledge base.Interventions:- Provide teaching at level of understanding- Provide teaching via preferred learning methods  Outcome: Progressing  Goal: Verbalizes understanding of labor plan  Description: Assess patient/family/caregiver's baseline knowledge level and ability to understand information.  Provide education via patient/family/caregiver's preferred learning method at appropriate level of understanding. 1. Provide teaching at level of understanding.2. Provide teaching via preferred learning method(s).  Outcome: Progressing     Problem: DISCHARGE PLANNING  Goal: Discharge to home or other facility with appropriate resources  Description: INTERVENTIONS:- Identify barriers to discharge w/patient and caregiver- Arrange for  needed discharge resources and transportation as appropriate- Identify discharge learning needs (meds, wound care, etc.)- Arrange for interpretive services to assist at discharge as needed- Refer to Case Management Department for coordinating discharge planning if the patient needs post-hospital services based on physician/advanced practitioner order or complex needs related to functional status, cognitive ability, or social support system  Outcome: Progressing     Problem: Labor & Delivery  Goal: Manages discomfort  Description: Assess and monitor for signs and symptoms of discomfort.  Assess patient's pain level regularly and per hospital policy.  Administer medications as ordered. Support use of nonpharmacological methods to help control pain such as distraction, imagery, relaxation, and application of heat and cold.  Collaborate with interdisciplinary team and patient to determine appropriate pain management plan.1. Include patient in decisions related to comfort.2. Offer non-pharmacological pain management interventions.3. Report ineffective pain management to physician.  Outcome: Progressing  Goal: Patient vital signs are stable  Description: 1. Assess vital signs - vaginal delivery.  Outcome: Progressing

## 2025-04-18 NOTE — ASSESSMENT & PLAN NOTE
Met criteria for severe features for sustained severe range blood pressures  Patient s/p 2 doses of betamethasone -  PreE w SF (SRBP): CBCwnl, CMP notable for AST/AL, CrT; p:c 7.6; s/p 24hr PP mag  - AST/ALT: 78/103 () -> 137/206 ( PM) -> 109/184 () -> 95/181 () -> 64/146 () -> 51/121 ()  - Cr: 0.9 () -> 1.05 () -> 1 () -> 1.05 () -> 1.06 () -> 1.01 () -> 0.96 ()  Current regimen: Procardia XL 60mg BID, lasix protocol  - : 20/40 Lab, Procardia 30qD  - : Procardia 60qD, IV Lab   - : Procardia 60 AM + 30 PM, IV Lab 20 (not given)  - : Procardia 60mg BID, lasix protocol    Monitor blood pressures and signs/symptoms of worsening preeclampsia  Consider adding labetalol 200mg BID    Systolic (12hrs), Av , Min:127 , Max:159   Diastolic (12hrs), Av, Min:68, Max:93

## 2025-04-18 NOTE — TELEPHONE ENCOUNTER
"FOLLOW UP: No    REASON FOR CONVERSATION: Hypertension - Pregnant    SYMPTOMS: slight headache, /109    OTHER: going to [facility]     DISPOSITION: Go to ED Now      Reason for Disposition   [1] Systolic BP  >= 160 OR Diastolic >= 100 AND [2] cardiac (e.g., breathing difficulty, chest pain) or neurologic symptoms (e.g., new-onset blurred or double vision, unsteady gait)    Answer Assessment - Initial Assessment Questions  1. BLOOD PRESSURE: \"What is your blood pressure?\" \"Did you take at least two measurements 5 minutes apart?\"        194/109    2. ONSET: \"When did you take your blood pressure?\"        10:35 pm     3. HOW: \"How did you take your blood pressure?\" (e.g., automatic home BP monitor, visiting nurse)        Automatic BP cuff at home     4. HISTORY: \"Do you have a history of high blood pressure?\"        No history of high blood pressure     5. MEDICINES: \"Are you taking any medicines for blood pressure?\" \"Have you missed any doses recently?\"        None     6. OTHER SYMPTOMS: \"Do you have any symptoms?\" (e.g., blurred vision, chest pain, difficulty breathing, headache, weakness)        Slight headache    7. PREGNANCY: \"Is there any chance you are pregnant?\" \"When was your last menstrual period?\"        34 weeks    Protocols used: Blood Pressure - High-Adult-AH    "

## 2025-04-18 NOTE — OB LABOR/OXYTOCIN SAFETY PROGRESS
Labor Progress Note - Teri Mckinney 39 y.o. female MRN: [id number]    Unit/Bed#: -01 Encounter: [id number]       Contraction Frequency (minutes): 30-4  Contraction Intensity: Mild/Moderate  Uterine Activity Characteristics: Occasional  Cervical Dilation: 2-3        Cervical Effacement: 30  Fetal Station: Ballotable  Baseline Rate (FHR): 125 bpm  Fetal Heart Rate (FHT): 130 BPM  FHR Category: 1               Vital Signs:   Vitals:    04/18/25 1900   BP:    Pulse: 93   Resp:    Temp:    SpO2: 99%       Huff balloon expelled. SVE difficult to obtain due to patient intolerance, but approx 2-3 cm dilated and remains thick and high. Plan for vaginal cytotec 25 mcg. D/w Dr. Malone.    America James MD 4/18/2025 7:51 PM

## 2025-04-18 NOTE — PLAN OF CARE
Problem: PAIN - ADULT  Goal: Verbalizes/displays adequate comfort level or baseline comfort level  Description: Interventions:- Encourage patient to monitor pain and request assistance- Assess pain using appropriate pain scale- Administer analgesics based on type and severity of pain and evaluate response- Implement non-pharmacological measures as appropriate and evaluate response- Consider cultural and social influences on pain and pain management- Notify physician/advanced practitioner if interventions unsuccessful or patient reports new pain  Outcome: Progressing     Problem: INFECTION - ADULT  Goal: Absence or prevention of progression during hospitalization  Description: INTERVENTIONS:- Assess and monitor for signs and symptoms of infection- Monitor lab/diagnostic results- Monitor all insertion sites, i.e. indwelling lines, tubes, and drains- Monitor endotracheal if appropriate and nasal secretions for changes in amount and color- [place] appropriate cooling/warming therapies per order- Administer medications as ordered- Instruct and encourage patient and family to use good hand hygiene technique- Identify and instruct in appropriate isolation precautions for identified infection/condition  Outcome: Progressing  Goal: Absence of fever/infection during neutropenic period  Description: INTERVENTIONS:- Monitor WBC  Outcome: Progressing     Problem: SAFETY ADULT  Goal: Patient will remain free of falls  Description: INTERVENTIONS:- Educate patient/family on patient safety including physical limitations- Instruct patient to call for assistance with activity - Consult OT/PT to assist with strengthening/mobility - Keep Call bell within reach- Keep bed low and locked with side rails adjusted as appropriate- Keep care items and personal belongings within reach- Initiate and maintain comfort rounds- Make Fall Risk Sign visible to staff  Outcome: Progressing  Goal: Maintain or return to baseline ADL  function  Description: INTERVENTIONS:-  Assess patient's ability to carry out ADLs; assess patient's baseline for ADL function and identify physical deficits which impact ability to perform ADLs (bathing, care of mouth/teeth, toileting, grooming, dressing, etc.)- Assess/evaluate cause of self-care deficits - Assess range of motion- Assess patient's mobility; develop plan if impaired- Assess patient's need for assistive devices and provide as appropriate- Encourage maximum independence but intervene and supervise when necessary- Involve family in performance of ADLs- Assess for home care needs following discharge - Consider OT consult to assist with ADL evaluation and planning for discharge- Provide patient education as appropriate  Outcome: Progressing  Goal: Maintains/Returns to pre admission functional level  Description: INTERVENTIONS:- Perform AM-PAC 6 Click Basic Mobility/ Daily Activity assessment daily.- Set and communicate daily mobility goal to care team and patient/family/caregiver. - Collaborate with rehabilitation services on mobility goals if consulted  Outcome: Progressing     Problem: Knowledge Deficit  Goal: Patient/family/caregiver demonstrates understanding of disease process, treatment plan, medications, and discharge instructions  Description: Complete learning assessment and assess knowledge base.Interventions:- Provide teaching at level of understanding- Provide teaching via preferred learning methods  Outcome: Progressing  Goal: Verbalizes understanding of labor plan  Description: Assess patient/family/caregiver's baseline knowledge level and ability to understand information.  Provide education via patient/family/caregiver's preferred learning method at appropriate level of understanding. 1. Provide teaching at level of understanding.2. Provide teaching via preferred learning method(s).  Outcome: Progressing     Problem: DISCHARGE PLANNING  Goal: Discharge to home or other facility with  appropriate resources  Description: INTERVENTIONS:- Identify barriers to discharge w/patient and caregiver- Arrange for needed discharge resources and transportation as appropriate- Identify discharge learning needs (meds, wound care, etc.)- Arrange for interpretive services to assist at discharge as needed- Refer to Case Management Department for coordinating discharge planning if the patient needs post-hospital services based on physician/advanced practitioner order or complex needs related to functional status, cognitive ability, or social support system  Outcome: Progressing     Problem: Labor & Delivery  Goal: Manages discomfort  Description: Assess and monitor for signs and symptoms of discomfort.  Assess patient's pain level regularly and per hospital policy.  Administer medications as ordered. Support use of nonpharmacological methods to help control pain such as distraction, imagery, relaxation, and application of heat and cold.  Collaborate with interdisciplinary team and patient to determine appropriate pain management plan.1. Include patient in decisions related to comfort.2. Offer non-pharmacological pain management interventions.3. Report ineffective pain management to physician.  Outcome: Progressing  Goal: Patient vital signs are stable  Description: 1. Assess vital signs - vaginal delivery.  Outcome: Progressing     Problem: BIRTH - VAGINAL/ SECTION  Goal: Fetal and maternal status remain reassuring during the birth process  Description: INTERVENTIONS:- Monitor vital signs- Monitor fetal heart rate- Monitor uterine activity- Monitor labor progression (vaginal delivery)- DVT prophylaxis- Antibiotic prophylaxis  Outcome: Progressing  Goal: Emotionally satisfying birthing experience for mother/fetus  Description: Interventions:- Assess, plan, implement and evaluate the nursing care given to the patient in labor- Advocate the philosophy that each childbirth experience is a unique experience and  support the family's chosen level of involvement and control during the labor process - Actively participate in both the patient's and family's teaching of the birth process- Consider cultural, Latter day and age-specific factors and plan care for the patient in labor  Outcome: Progressing

## 2025-04-18 NOTE — H&P
"H & P- Obstetrics   Teri Mckinney 39 y.o. female MRN: [id number]  Unit/Bed#: LD TRIAGE 2- Encounter: [id number]    Assessment: 39 y.o.  at 34w1d admitted for induction of labor in the setting of preeclampsia with severe features.  SVE: 0/0/-  Clinical EFW: 63rd percentile ; Cephalic confirmed by ultrasound    Plan:   34 weeks gestation of pregnancy  Assessment & Plan  Admit to OBGYN   Clear liquid diet   F/u T&S, CBC, RPR   IVF NS 125cc/hr   Continuous fetal monitoring and tocometry   Analgesia at maternal request   Vertex by TAUS  Induction plan: PO Cytotec, eventual Huff balloon, eventual Pitocin titration, possible amniotomy      Diet controlled gestational diabetes mellitus (GDM) in third trimester  Assessment & Plan    Lab Results   Component Value Date    HGBA1C 6.0 (H) 2025     1 hour   Patient reports completely changing her diet since that time and that her blood sugars have been relatively well-controlled  Patient would like to receive betamethasone for fetal lung maturity, we discussed that this will increase her sugars and likely mean that she requires insulin during her labor course, patient feels that \"sugars are easier to control\" and is understanding of this risk      * Severe preeclampsia  Assessment & Plan  Patient presented to labor and delivery following elevated blood pressures at home.  She was found to have sustained severe range blood pressures.  Acute treatment:  : IV labetalol 20 mg, 40 mg  Magnesium gtt. started at 0216  P: C7.6  cBC/CMP within normal limits  Continue to trend blood pressures and treat acutely as needed  Patient counseled on need for delivery at 34 weeks in the setting of preeclampsia with severe features.  Patient to receive 2 doses of betamethasone -  Patient understands the relative risk of hyperglycemia in the setting of her known gestational diabetes versus the relative benefits of betamethasone for fetal lung maturity.  We discussed " the [place] trial  After counseling and with shared decision making, patient elects to receive betamethasone understanding it may mean she requires insulin during her labor course          Discussed case and plan w/ Dr. Davis      Chief Complaint: elevated blood pressures at home    HPI: Teri Mckinney is a 39 y.o.  with an JOSE RAUL of 2025, by Ultrasound at 34w1d who is being admitted for preeclampsia with severe features.  She initially presented to labor and delivery secondary to elevated blood pressures at home and was noted to have sustained severe range blood pressures meeting criteria for preeclampsia with severe features and necessitating delivery as she is greater than 34 weeks gestation.  She denies having uterine contractions, has no LOF, and reports no VB. She states she has felt good FM.    Patient Active Problem List   Diagnosis    Diet controlled gestational diabetes mellitus (GDM) in third trimester    AMA (advanced maternal age) primigravida 35+, third trimester    Severe obesity due to excess calories affecting pregnancy in third trimester (HCC)    Pregnancy resulting from in vitro fertilization in third trimester    34 weeks gestation of pregnancy    Severe preeclampsia       Baby complications/comments: none    Review of Systems   Constitutional:  Negative for chills and fever.   Respiratory:  Negative for cough, shortness of breath and wheezing.    Cardiovascular:  Negative for chest pain and leg swelling.   Gastrointestinal:  Negative for abdominal pain, diarrhea, nausea and vomiting.   Genitourinary:  Negative for pelvic pain, vaginal bleeding and vaginal discharge.   Musculoskeletal:  Negative for back pain.   Neurological:  Negative for weakness, light-headedness and headaches.       OB Hx:  OB History    Para Term  AB Living   1 0 0 0 0 0   SAB IAB Ectopic Multiple Live Births   0 0 0 0 0      # Outcome Date GA Lbr Marcus/2nd Weight Sex Type Anes PTL Lv   1 Current                Obstetric Comments   Menarche - 9       Past Medical Hx:  Past Medical History:   Diagnosis Date    Depression     years ago hx of- no meds > 20 yrs    Female infertility     [place]    Migraine     Hx of  none since middle school    Varicella     childhood chickenpox       Past Surgical hx:  Past Surgical History:   Procedure Laterality Date    OVUM / OOCYTE RETRIEVAL      WISDOM TOOTH EXTRACTION      2007       Social Hx:  Alcohol use: denies  Tobacco use: denies  Other substance use: denies      No Known Allergies      Medications Prior to Admission:     aspirin (Aspirin 81) 81 mg EC tablet    calcium carbonate (Tums) 500 mg chewable tablet    choline fenofibrate (TRILIPIX) 135 MG capsule    Prenatal Vit-Fe Fumarate-FA (PRENATAL PO)    Blood Glucose Monitoring Suppl (Contour Next EZ) w/Device KIT    Contour Next Test test strip    docusate sodium (COLACE) 100 mg capsule    Microlet Lancets MISC    Objective:  HR:  [81-94] 81  BP: (130-191)/() 130/75  Resp:  [18-20] 20  SpO2:  [99 %] 99 %  There is no height or weight on file to calculate BMI.     Physical Exam:  Physical Exam  Constitutional:       Appearance: Normal appearance.   Cardiovascular:      Rate and Rhythm: Normal rate and regular rhythm.   Pulmonary:      Effort: Pulmonary effort is normal. No respiratory distress.   Abdominal:      Palpations: Abdomen is soft.      Tenderness: There is no abdominal tenderness.   Musculoskeletal:         General: No swelling or tenderness.   Neurological:      General: No focal deficit present.      Mental Status: She is alert and oriented to person, place, and time.   Skin:     General: Skin is warm and dry.   Vitals reviewed.            FHT:  Baseline Rate (FHR): 135 bpm  Variability: Moderate  Accelerations: 15 x 15 or greater  Decelerations: None  FHR Category: Category I    TOCO:   Contraction Frequency (minutes): 0  Contraction Duration (seconds): 0    Lab Results   Component Value Date    WBC  9.16 04/18/2025    HGB 11.9 04/18/2025    HCT 34.9 04/18/2025     04/18/2025     Lab Results   Component Value Date     04/22/2015    K 3.9 04/18/2025     04/18/2025    CO2 22 04/18/2025    BUN 18 04/18/2025    CREATININE 0.81 04/18/2025    GLUCOSE 101 04/22/2015    AST 17 04/18/2025    ALT 13 04/18/2025     Prenatal Labs: Reviewed      Blood type: O pos  Antibody: Negative  GBS: Pending  HIV: Non-reactive  Rubella: Immune  Syphilis IgM/IgG: Non-reactive  HBsAg: Non-reactive  HCAb: Non-reactive  Chlamydia: Negative  Gonorrhea: Negative  Diabetes 1 hour screen: 205  3 hour glucose: Not indicated  Platelets: 276  Hgb: 11.9  >2 Midnights  INPATIENT     Signature/Title: Arlet Davila MD  Date: 4/18/2025  Time: 3:10 AM

## 2025-04-18 NOTE — ASSESSMENT & PLAN NOTE
-  mL, Hgb 10.1 --> 10.5  - Pain well controlled with oral analgesics  - Voiding spontaneously, passed VT  - Tolerating PO fluids and solids  - Ambulating without difficulty  - DVT ppx with Lovenox 40mg BID, SCDs  - Anticipate discharge POD#3-4

## 2025-04-18 NOTE — OB LABOR/OXYTOCIN SAFETY PROGRESS
Labor Progress Note - Teri Mckinney 39 y.o. female MRN: [id number]    Unit/Bed#: LD TRIAGE 2-01 Encounter: [id number]       Contraction Frequency (minutes): 0        Cervical Dilation: Fingertip        Cervical Effacement: 0  Fetal Station: Ballotable  Baseline Rate (FHR): 130 bpm  Fetal Heart Rate (FHT): 130 BPM  FHR Category: 1               Vital Signs:   Vitals:    04/18/25 0842   BP: 165/95   Pulse: 86   Resp:    Temp:    SpO2:        Notes/comments:   PROCEDURE:  RODRIGUEZ BALLOON PLACEMENT    A 24F rodriguez with a 30cc balloon was selected, SVE was performed and cervix was located, rodriguez was introduced over sterile gloved hands. Balloon advanced through cervix beyond the internal cervical os. A small amount amount of sterile saline solution was instilled in the balloon to confirm placement. Placement was confirmed to be beyond the internal cervical os. A total of 60cc of sterile saline solution was placed into the balloon. Pt tolerated well. Instructions left with RN to notify MD when dislodged.    Will hold off on additional dose of cytotec since patient is having contractions, stadol/phenergan ordered for pain control    SRBP noted after recent SVE, will hold off on rechecking until more comfortable, FHT has been category 1           Ale Mayes MD 4/18/2025 9:06 AM

## 2025-04-19 ENCOUNTER — ANESTHESIA (INPATIENT)
Dept: LABOR AND DELIVERY | Facility: HOSPITAL | Age: 40
End: 2025-04-19
Payer: COMMERCIAL

## 2025-04-19 ENCOUNTER — ANESTHESIA EVENT (INPATIENT)
Dept: LABOR AND DELIVERY | Facility: HOSPITAL | Age: 40
End: 2025-04-19
Payer: COMMERCIAL

## 2025-04-19 LAB
ALBUMIN SERPL BCG-MCNC: 2.7 G/DL (ref 3.5–5)
ALBUMIN SERPL BCG-MCNC: 2.8 G/DL (ref 3.5–5)
ALBUMIN SERPL BCG-MCNC: 3 G/DL (ref 3.5–5)
ALBUMIN SERPL BCG-MCNC: 3 G/DL (ref 3.5–5)
ALP SERPL-CCNC: 106 U/L (ref 34–104)
ALP SERPL-CCNC: 106 U/L (ref 34–104)
ALP SERPL-CCNC: 109 U/L (ref 34–104)
ALP SERPL-CCNC: 98 U/L (ref 34–104)
ALT SERPL W P-5'-P-CCNC: 15 U/L (ref 7–52)
ALT SERPL W P-5'-P-CCNC: 16 U/L (ref 7–52)
ALT SERPL W P-5'-P-CCNC: 18 U/L (ref 7–52)
ALT SERPL W P-5'-P-CCNC: 21 U/L (ref 7–52)
ANION GAP SERPL CALCULATED.3IONS-SCNC: 11 MMOL/L (ref 4–13)
ANION GAP SERPL CALCULATED.3IONS-SCNC: 12 MMOL/L (ref 4–13)
ANION GAP SERPL CALCULATED.3IONS-SCNC: 13 MMOL/L (ref 4–13)
ANION GAP SERPL CALCULATED.3IONS-SCNC: 9 MMOL/L (ref 4–13)
AST SERPL W P-5'-P-CCNC: 17 U/L (ref 13–39)
AST SERPL W P-5'-P-CCNC: 18 U/L (ref 13–39)
AST SERPL W P-5'-P-CCNC: 18 U/L (ref 13–39)
AST SERPL W P-5'-P-CCNC: 20 U/L (ref 13–39)
BILIRUB SERPL-MCNC: 0.32 MG/DL (ref 0.2–1)
BUN SERPL-MCNC: 15 MG/DL (ref 5–25)
BUN SERPL-MCNC: 15 MG/DL (ref 5–25)
BUN SERPL-MCNC: 16 MG/DL (ref 5–25)
BUN SERPL-MCNC: 16 MG/DL (ref 5–25)
CALCIUM ALBUM COR SERPL-MCNC: 8.1 MG/DL (ref 8.3–10.1)
CALCIUM ALBUM COR SERPL-MCNC: 8.1 MG/DL (ref 8.3–10.1)
CALCIUM ALBUM COR SERPL-MCNC: 8.3 MG/DL (ref 8.3–10.1)
CALCIUM ALBUM COR SERPL-MCNC: 8.4 MG/DL (ref 8.3–10.1)
CALCIUM SERPL-MCNC: 7.3 MG/DL (ref 8.4–10.2)
CALCIUM SERPL-MCNC: 7.4 MG/DL (ref 8.4–10.2)
CHLORIDE SERPL-SCNC: 106 MMOL/L (ref 96–108)
CHLORIDE SERPL-SCNC: 107 MMOL/L (ref 96–108)
CHLORIDE SERPL-SCNC: 107 MMOL/L (ref 96–108)
CHLORIDE SERPL-SCNC: 108 MMOL/L (ref 96–108)
CO2 SERPL-SCNC: 15 MMOL/L (ref 21–32)
CO2 SERPL-SCNC: 17 MMOL/L (ref 21–32)
CO2 SERPL-SCNC: 17 MMOL/L (ref 21–32)
CO2 SERPL-SCNC: 18 MMOL/L (ref 21–32)
CREAT SERPL-MCNC: 0.88 MG/DL (ref 0.6–1.3)
CREAT SERPL-MCNC: 0.9 MG/DL (ref 0.6–1.3)
CREAT SERPL-MCNC: 0.95 MG/DL (ref 0.6–1.3)
CREAT SERPL-MCNC: 0.96 MG/DL (ref 0.6–1.3)
ERYTHROCYTE [DISTWIDTH] IN BLOOD BY AUTOMATED COUNT: 13.9 % (ref 11.6–15.1)
ERYTHROCYTE [DISTWIDTH] IN BLOOD BY AUTOMATED COUNT: 13.9 % (ref 11.6–15.1)
ERYTHROCYTE [DISTWIDTH] IN BLOOD BY AUTOMATED COUNT: 14.1 % (ref 11.6–15.1)
ERYTHROCYTE [DISTWIDTH] IN BLOOD BY AUTOMATED COUNT: 14.2 % (ref 11.6–15.1)
GFR SERPL CREATININE-BSD FRML MDRD: 74 ML/MIN/1.73SQ M
GFR SERPL CREATININE-BSD FRML MDRD: 75 ML/MIN/1.73SQ M
GFR SERPL CREATININE-BSD FRML MDRD: 80 ML/MIN/1.73SQ M
GFR SERPL CREATININE-BSD FRML MDRD: 82 ML/MIN/1.73SQ M
GLUCOSE SERPL-MCNC: 112 MG/DL (ref 65–140)
GLUCOSE SERPL-MCNC: 114 MG/DL (ref 65–140)
GLUCOSE SERPL-MCNC: 115 MG/DL (ref 65–140)
GLUCOSE SERPL-MCNC: 115 MG/DL (ref 65–140)
GLUCOSE SERPL-MCNC: 122 MG/DL (ref 65–140)
GLUCOSE SERPL-MCNC: 126 MG/DL (ref 65–140)
GLUCOSE SERPL-MCNC: 129 MG/DL (ref 65–140)
GLUCOSE SERPL-MCNC: 130 MG/DL (ref 65–140)
GLUCOSE SERPL-MCNC: 130 MG/DL (ref 65–140)
GLUCOSE SERPL-MCNC: 131 MG/DL (ref 65–140)
GLUCOSE SERPL-MCNC: 135 MG/DL (ref 65–140)
GLUCOSE SERPL-MCNC: 136 MG/DL (ref 65–140)
GLUCOSE SERPL-MCNC: 138 MG/DL (ref 65–140)
GLUCOSE SERPL-MCNC: 138 MG/DL (ref 65–140)
GLUCOSE SERPL-MCNC: 139 MG/DL (ref 65–140)
GLUCOSE SERPL-MCNC: 141 MG/DL (ref 65–140)
GLUCOSE SERPL-MCNC: 144 MG/DL (ref 65–140)
GLUCOSE SERPL-MCNC: 145 MG/DL (ref 65–140)
GLUCOSE SERPL-MCNC: 145 MG/DL (ref 65–140)
GLUCOSE SERPL-MCNC: 150 MG/DL (ref 65–140)
GLUCOSE SERPL-MCNC: 152 MG/DL (ref 65–140)
GLUCOSE SERPL-MCNC: 154 MG/DL (ref 65–140)
GLUCOSE SERPL-MCNC: 156 MG/DL (ref 65–140)
GP B STREP DNA SPEC QL NAA+PROBE: NEGATIVE
HCT VFR BLD AUTO: 34.2 % (ref 34.8–46.1)
HCT VFR BLD AUTO: 35.5 % (ref 34.8–46.1)
HCT VFR BLD AUTO: 35.6 % (ref 34.8–46.1)
HCT VFR BLD AUTO: 36.5 % (ref 34.8–46.1)
HGB BLD-MCNC: 11.5 G/DL (ref 11.5–15.4)
HGB BLD-MCNC: 11.6 G/DL (ref 11.5–15.4)
HGB BLD-MCNC: 11.7 G/DL (ref 11.5–15.4)
HGB BLD-MCNC: 11.9 G/DL (ref 11.5–15.4)
MAGNESIUM SERPL-MCNC: 5.2 MG/DL (ref 1.9–2.7)
MAGNESIUM SERPL-MCNC: 5.3 MG/DL (ref 1.9–2.7)
MAGNESIUM SERPL-MCNC: 5.3 MG/DL (ref 1.9–2.7)
MAGNESIUM SERPL-MCNC: 5.6 MG/DL (ref 1.9–2.7)
MCH RBC QN AUTO: 29.8 PG (ref 26.8–34.3)
MCH RBC QN AUTO: 30.2 PG (ref 26.8–34.3)
MCH RBC QN AUTO: 30.3 PG (ref 26.8–34.3)
MCH RBC QN AUTO: 30.7 PG (ref 26.8–34.3)
MCHC RBC AUTO-ENTMCNC: 32.6 G/DL (ref 31.4–37.4)
MCHC RBC AUTO-ENTMCNC: 32.6 G/DL (ref 31.4–37.4)
MCHC RBC AUTO-ENTMCNC: 33 G/DL (ref 31.4–37.4)
MCHC RBC AUTO-ENTMCNC: 33.6 G/DL (ref 31.4–37.4)
MCV RBC AUTO: 91 FL (ref 82–98)
MCV RBC AUTO: 92 FL (ref 82–98)
MCV RBC AUTO: 92 FL (ref 82–98)
MCV RBC AUTO: 93 FL (ref 82–98)
PLATELET # BLD AUTO: 280 THOUSANDS/UL (ref 149–390)
PLATELET # BLD AUTO: 301 THOUSANDS/UL (ref 149–390)
PLATELET # BLD AUTO: 304 THOUSANDS/UL (ref 149–390)
PLATELET # BLD AUTO: 310 THOUSANDS/UL (ref 149–390)
PMV BLD AUTO: 10.8 FL (ref 8.9–12.7)
PMV BLD AUTO: 10.8 FL (ref 8.9–12.7)
PMV BLD AUTO: 10.9 FL (ref 8.9–12.7)
PMV BLD AUTO: 11.1 FL (ref 8.9–12.7)
POTASSIUM SERPL-SCNC: 4.1 MMOL/L (ref 3.5–5.3)
POTASSIUM SERPL-SCNC: 4.3 MMOL/L (ref 3.5–5.3)
PROT SERPL-MCNC: 5.5 G/DL (ref 6.4–8.4)
PROT SERPL-MCNC: 5.7 G/DL (ref 6.4–8.4)
PROT SERPL-MCNC: 5.9 G/DL (ref 6.4–8.4)
PROT SERPL-MCNC: 6.1 G/DL (ref 6.4–8.4)
RBC # BLD AUTO: 3.74 MILLION/UL (ref 3.81–5.12)
RBC # BLD AUTO: 3.86 MILLION/UL (ref 3.81–5.12)
RBC # BLD AUTO: 3.89 MILLION/UL (ref 3.81–5.12)
RBC # BLD AUTO: 3.94 MILLION/UL (ref 3.81–5.12)
SODIUM SERPL-SCNC: 134 MMOL/L (ref 135–147)
SODIUM SERPL-SCNC: 135 MMOL/L (ref 135–147)
SODIUM SERPL-SCNC: 135 MMOL/L (ref 135–147)
SODIUM SERPL-SCNC: 136 MMOL/L (ref 135–147)
WBC # BLD AUTO: 11.88 THOUSAND/UL (ref 4.31–10.16)
WBC # BLD AUTO: 11.9 THOUSAND/UL (ref 4.31–10.16)
WBC # BLD AUTO: 12.82 THOUSAND/UL (ref 4.31–10.16)
WBC # BLD AUTO: 14.18 THOUSAND/UL (ref 4.31–10.16)

## 2025-04-19 PROCEDURE — 82948 REAGENT STRIP/BLOOD GLUCOSE: CPT

## 2025-04-19 PROCEDURE — 80053 COMPREHEN METABOLIC PANEL: CPT | Performed by: STUDENT IN AN ORGANIZED HEALTH CARE EDUCATION/TRAINING PROGRAM

## 2025-04-19 PROCEDURE — 85027 COMPLETE CBC AUTOMATED: CPT | Performed by: STUDENT IN AN ORGANIZED HEALTH CARE EDUCATION/TRAINING PROGRAM

## 2025-04-19 PROCEDURE — 83735 ASSAY OF MAGNESIUM: CPT | Performed by: STUDENT IN AN ORGANIZED HEALTH CARE EDUCATION/TRAINING PROGRAM

## 2025-04-19 RX ORDER — LABETALOL HYDROCHLORIDE 5 MG/ML
20 INJECTION, SOLUTION INTRAVENOUS ONCE
Status: DISCONTINUED | OUTPATIENT
Start: 2025-04-19 | End: 2025-04-20

## 2025-04-19 RX ORDER — CALCIUM CARBONATE 500 MG/1
1000 TABLET, CHEWABLE ORAL 3 TIMES DAILY PRN
Status: DISCONTINUED | OUTPATIENT
Start: 2025-04-19 | End: 2025-04-21

## 2025-04-19 RX ORDER — HYDROXYZINE HYDROCHLORIDE 25 MG/1
25 TABLET, FILM COATED ORAL EVERY 6 HOURS PRN
Status: DISCONTINUED | OUTPATIENT
Start: 2025-04-19 | End: 2025-04-21 | Stop reason: SDUPTHER

## 2025-04-19 RX ORDER — LABETALOL HYDROCHLORIDE 5 MG/ML
20 INJECTION, SOLUTION INTRAVENOUS ONCE
Status: COMPLETED | OUTPATIENT
Start: 2025-04-19 | End: 2025-04-19

## 2025-04-19 RX ORDER — OXYTOCIN/RINGER'S LACTATE 30/500 ML
PLASTIC BAG, INJECTION (ML) INTRAVENOUS
Status: COMPLETED
Start: 2025-04-19 | End: 2025-04-19

## 2025-04-19 RX ORDER — NIFEDIPINE 60 MG/1
60 TABLET, EXTENDED RELEASE ORAL DAILY
Status: DISCONTINUED | OUTPATIENT
Start: 2025-04-20 | End: 2025-04-21

## 2025-04-19 RX ORDER — NIFEDIPINE 30 MG/1
30 TABLET, EXTENDED RELEASE ORAL ONCE
Status: COMPLETED | OUTPATIENT
Start: 2025-04-19 | End: 2025-04-19

## 2025-04-19 RX ORDER — OXYTOCIN/RINGER'S LACTATE 30/500 ML
1-30 PLASTIC BAG, INJECTION (ML) INTRAVENOUS
Status: DISCONTINUED | OUTPATIENT
Start: 2025-04-19 | End: 2025-04-21

## 2025-04-19 RX ORDER — LABETALOL HYDROCHLORIDE 5 MG/ML
INJECTION, SOLUTION INTRAVENOUS
Status: COMPLETED
Start: 2025-04-19 | End: 2025-04-19

## 2025-04-19 RX ADMIN — PENICILLIN G POTASSIUM 2.5 MILLION UNITS: 20000000 INJECTION, POWDER, FOR SOLUTION INTRAMUSCULAR; INTRAVENOUS at 04:52

## 2025-04-19 RX ADMIN — MAGNESIUM SULFATE IN WATER 1 G/HR: 20 INJECTION, SOLUTION INTRAVENOUS at 19:44

## 2025-04-19 RX ADMIN — SODIUM CHLORIDE 50 ML/HR: 0.9 INJECTION, SOLUTION INTRAVENOUS at 23:11

## 2025-04-19 RX ADMIN — PENICILLIN G POTASSIUM 2.5 MILLION UNITS: 20000000 INJECTION, POWDER, FOR SOLUTION INTRAMUSCULAR; INTRAVENOUS at 18:06

## 2025-04-19 RX ADMIN — Medication 25 MCG: at 07:50

## 2025-04-19 RX ADMIN — SODIUM CHLORIDE 50 ML/HR: 0.9 INJECTION, SOLUTION INTRAVENOUS at 01:34

## 2025-04-19 RX ADMIN — PENICILLIN G POTASSIUM 2.5 MILLION UNITS: 20000000 INJECTION, POWDER, FOR SOLUTION INTRAMUSCULAR; INTRAVENOUS at 14:08

## 2025-04-19 RX ADMIN — Medication 25 MCG: at 02:25

## 2025-04-19 RX ADMIN — LABETALOL HYDROCHLORIDE 20 MG: 5 INJECTION, SOLUTION INTRAVENOUS at 02:51

## 2025-04-19 RX ADMIN — NIFEDIPINE 30 MG: 30 TABLET, FILM COATED, EXTENDED RELEASE ORAL at 08:00

## 2025-04-19 RX ADMIN — NIFEDIPINE 30 MG: 30 TABLET, FILM COATED, EXTENDED RELEASE ORAL at 10:58

## 2025-04-19 RX ADMIN — LABETALOL HYDROCHLORIDE 20 MG: 5 INJECTION, SOLUTION INTRAVENOUS at 11:30

## 2025-04-19 RX ADMIN — PENICILLIN G POTASSIUM 2.5 MILLION UNITS: 20000000 INJECTION, POWDER, FOR SOLUTION INTRAMUSCULAR; INTRAVENOUS at 09:53

## 2025-04-19 RX ADMIN — Medication 25 MCG: at 11:59

## 2025-04-19 RX ADMIN — MAGNESIUM SULFATE IN WATER 1 G/HR: 20 INJECTION, SOLUTION INTRAVENOUS at 00:18

## 2025-04-19 RX ADMIN — DEXTROSE AND SODIUM CHLORIDE 50 ML/HR: 5; .9 INJECTION, SOLUTION INTRAVENOUS at 14:07

## 2025-04-19 RX ADMIN — PENICILLIN G POTASSIUM 2.5 MILLION UNITS: 20000000 INJECTION, POWDER, FOR SOLUTION INTRAMUSCULAR; INTRAVENOUS at 01:11

## 2025-04-19 RX ADMIN — OXYTOCIN 2 MILLI-UNITS/MIN: 10 INJECTION INTRAVENOUS at 18:00

## 2025-04-19 RX ADMIN — BETAMETHASONE SODIUM PHOSPHATE AND BETAMETHASONE ACETATE 12 MG: 3; 3 INJECTION, SUSPENSION INTRA-ARTICULAR; INTRALESIONAL; INTRAMUSCULAR at 04:09

## 2025-04-19 NOTE — OB LABOR/OXYTOCIN SAFETY PROGRESS
Labor Progress Note - Teri Mckinney 39 y.o. female MRN: [id number]    Unit/Bed#: -01 Encounter: [id number]       Contraction Frequency (minutes): 2.5-7  Contraction Intensity: Mild  Uterine Activity Characteristics: Irregular  Cervical Dilation: 3        Cervical Effacement: 30  Fetal Station: Ballotable  Baseline Rate (FHR): 125 bpm  Fetal Heart Rate (FHT): 132 BPM  FHR Category: I               Vital Signs:   Vitals:    04/19/25 1150   BP: 118/59   Pulse: 73   Resp:    Temp:    SpO2:        Notes/comments:   Patient comfortable. FHT cat I. SVE as above. Vaginal cytotec placed. Will reassess in 4 hours. Discussed with Dr. Malone.     Francheska Davis MD 4/19/2025 12:03 PM

## 2025-04-19 NOTE — OB LABOR/OXYTOCIN SAFETY PROGRESS
Labor Progress Note - Teri Mckinney 39 y.o. female MRN: [id number]    Unit/Bed#: -01 Encounter: [id number]       Contraction Frequency (minutes): irregular  Contraction Intensity: Mild  Uterine Activity Characteristics: Irritability  Cervical Dilation: 3        Cervical Effacement: 30  Fetal Station: Ballotable  Baseline Rate (FHR): 125 bpm  Fetal Heart Rate (FHT): 123 BPM  FHR Category: 1               Vital Signs:   Vitals:    04/19/25 0211   BP: 149/80   Pulse: 94   Resp:    Temp:    SpO2:        FHT cat 1 for over an hour since deceleration. Periods of minimal variability, but not unexpected while on Mg gtt. 25 mcg cytotec placed vaginally. Severe range BP noted at time of placement in setting of anxiety due to check and intolerance of checks. Will f/u repeat BP and treat PRN.    America James MD 4/19/2025 2:33 AM

## 2025-04-19 NOTE — OB LABOR/OXYTOCIN SAFETY PROGRESS
Labor Progress Note - Teri Mckinney 39 y.o. female MRN: [id number]    Unit/Bed#: -01 Encounter: [id number]       Contraction Frequency (minutes): irregular  Contraction Intensity: Mild  Uterine Activity Characteristics: Irritability  Cervical Dilation: 3        Cervical Effacement: 30  Fetal Station: Ballotable  Baseline Rate (FHR): 120 bpm  Fetal Heart Rate (FHT): 129 BPM  FHR Category: 1               Vital Signs:   Vitals:    04/19/25 0326   BP: 146/78   Pulse: 84   Resp:    Temp:    SpO2:        Repeat blood pressure sustained in severe range. Received IV labetalol 20 mg and f/u BP now mild range. Will continue to monitor closely and treat PRN.  D/w Dr. Malone.    America James MD 4/19/2025 3:33 AM

## 2025-04-19 NOTE — PLAN OF CARE
Problem: PAIN - ADULT  Goal: Verbalizes/displays adequate comfort level or baseline comfort level  Description: Interventions:- Encourage patient to monitor pain and request assistance- Assess pain using appropriate pain scale- Administer analgesics based on type and severity of pain and evaluate response- Implement non-pharmacological measures as appropriate and evaluate response- Consider cultural and social influences on pain and pain management- Notify physician/advanced practitioner if interventions unsuccessful or patient reports new pain  Outcome: Progressing     Problem: INFECTION - ADULT  Goal: Absence or prevention of progression during hospitalization  Description: INTERVENTIONS:- Assess and monitor for signs and symptoms of infection- Monitor lab/diagnostic results- Monitor all insertion sites, i.e. indwelling lines, tubes, and drains- Monitor endotracheal if appropriate and nasal secretions for changes in amount and color- [place] appropriate cooling/warming therapies per order- Administer medications as ordered- Instruct and encourage patient and family to use good hand hygiene technique- Identify and instruct in appropriate isolation precautions for identified infection/condition  Outcome: Progressing  Goal: Absence of fever/infection during neutropenic period  Description: INTERVENTIONS:- Monitor WBC  Outcome: Progressing     Problem: SAFETY ADULT  Goal: Patient will remain free of falls  Description: INTERVENTIONS:- Educate patient/family on patient safety including physical limitations- Instruct patient to call for assistance with activity - Consult OT/PT to assist with strengthening/mobility - Keep Call bell within reach- Keep bed low and locked with side rails adjusted as appropriate- Keep care items and personal belongings within reach- Initiate and maintain comfort rounds- Make Fall Risk Sign visible to staff- Offer Toileting every  Hours, in advance of need- Initiate/Maintain alarm- Obtain  necessary fall risk management equipment: - Apply yellow socks and bracelet for high fall risk patients- Consider moving patient to room near nurses station  Outcome: Progressing  Goal: Maintain or return to baseline ADL function  Description: INTERVENTIONS:-  Assess patient's ability to carry out ADLs; assess patient's baseline for ADL function and identify physical deficits which impact ability to perform ADLs (bathing, care of mouth/teeth, toileting, grooming, dressing, etc.)- Assess/evaluate cause of self-care deficits - Assess range of motion- Assess patient's mobility; develop plan if impaired- Assess patient's need for assistive devices and provide as appropriate- Encourage maximum independence but intervene and supervise when necessary- Involve family in performance of ADLs- Assess for home care needs following discharge - Consider OT consult to assist with ADL evaluation and planning for discharge- Provide patient education as appropriate  Outcome: Progressing  Goal: Maintains/Returns to pre admission functional level  Description: INTERVENTIONS:- Perform AM-PAC 6 Click Basic Mobility/ Daily Activity assessment daily.- Set and communicate daily mobility goal to care team and patient/family/caregiver. - Collaborate with rehabilitation services on mobility goals if consulted- Perform Range of Motion  times a day.- Reposition patient every  hours.- Dangle patient  times a day- Stand patient  times a day- Ambulate patient  times a day- Out of bed to chair  times a day - Out of bed for meals times a day- Out of bed for toileting- Record patient progress and toleration of activity level   Outcome: Progressing     Problem: Knowledge Deficit  Goal: Patient/family/caregiver demonstrates understanding of disease process, treatment plan, medications, and discharge instructions  Description: Complete learning assessment and assess knowledge base.Interventions:- Provide teaching at level of understanding- Provide  teaching via preferred learning methods  Outcome: Progressing  Goal: Verbalizes understanding of labor plan  Description: Assess patient/family/caregiver's baseline knowledge level and ability to understand information.  Provide education via patient/family/caregiver's preferred learning method at appropriate level of understanding. 1. Provide teaching at level of understanding.2. Provide teaching via preferred learning method(s).  Outcome: Progressing     Problem: DISCHARGE PLANNING  Goal: Discharge to home or other facility with appropriate resources  Description: INTERVENTIONS:- Identify barriers to discharge w/patient and caregiver- Arrange for needed discharge resources and transportation as appropriate- Identify discharge learning needs (meds, wound care, etc.)- Arrange for interpretive services to assist at discharge as needed- Refer to Case Management Department for coordinating discharge planning if the patient needs post-hospital services based on physician/advanced practitioner order or complex needs related to functional status, cognitive ability, or social support system  Outcome: Progressing     Problem: Labor & Delivery  Goal: Manages discomfort  Description: Assess and monitor for signs and symptoms of discomfort.  Assess patient's pain level regularly and per hospital policy.  Administer medications as ordered. Support use of nonpharmacological methods to help control pain such as distraction, imagery, relaxation, and application of heat and cold.  Collaborate with interdisciplinary team and patient to determine appropriate pain management plan.1. Include patient in decisions related to comfort.2. Offer non-pharmacological pain management interventions.3. Report ineffective pain management to physician.  Outcome: Progressing  Goal: Patient vital signs are stable  Description: 1. Assess vital signs - vaginal delivery.  Outcome: Progressing     Problem: BIRTH - VAGINAL/ SECTION  Goal: Fetal  and maternal status remain reassuring during the birth process  Description: INTERVENTIONS:- Monitor vital signs- Monitor fetal heart rate- Monitor uterine activity- Monitor labor progression (vaginal delivery)- DVT prophylaxis- Antibiotic prophylaxis  Outcome: Progressing  Goal: Emotionally satisfying birthing experience for mother/fetus  Description: Interventions:- Assess, plan, implement and evaluate the nursing care given to the patient in labor- Advocate the philosophy that each childbirth experience is a unique experience and support the family's chosen level of involvement and control during the labor process - Actively participate in both the patient's and family's teaching of the birth process- Consider cultural, Confucianism and age-specific factors and plan care for the patient in labor  Outcome: Progressing

## 2025-04-19 NOTE — OB LABOR/OXYTOCIN SAFETY PROGRESS
Labor Progress Note - Teri Mckinney 39 y.o. female MRN: [id number]    Unit/Bed#: -01 Encounter: [id number]       Contraction Frequency (minutes): difficult to trace, pt laying on her side  Contraction Intensity: Mild  Uterine Activity Characteristics: Irritability  Cervical Dilation: 3-4        Cervical Effacement: 30  Fetal Station: Ballotable  Baseline Rate (FHR): 125 bpm  Fetal Heart Rate (FHT): 129 BPM  FHR Category: I               Vital Signs:   Vitals:    04/19/25 1500   BP: 129/62   Pulse: 93   Resp:    Temp:    SpO2:        Notes/comments:   Patient comfortable. FHT cat I. SVE as above. Plan to start pitocin titration. Discussed with Dr. Wilkins.     Francheska Davis MD 4/19/2025 4:21 PM

## 2025-04-19 NOTE — OB LABOR/OXYTOCIN SAFETY PROGRESS
Labor Progress Note - Trei Mckinney 39 y.o. female MRN: [id number]    Unit/Bed#: -01 Encounter: [id number]       Contraction Frequency (minutes): irregular  Contraction Intensity: Mild  Uterine Activity Characteristics: Irritability  Cervical Dilation: 3        Cervical Effacement: 30  Fetal Station: Ballotable  Baseline Rate (FHR): 125 bpm  Fetal Heart Rate (FHT): 128 BPM  FHR Category: I               Vital Signs:   Vitals:    04/19/25 0715   BP: 150/84   Pulse: 82   Resp:    Temp:    SpO2:        Notes/comments:   Patient comfortable. FHT cat I. SVE unchanged. Vaginal cytotec placed. Will reassess in 3 hours or sooner if clinically indicated. Dr. Malone aware.      Francheska Davis MD 4/19/2025 7:54 AM

## 2025-04-19 NOTE — OB LABOR/OXYTOCIN SAFETY PROGRESS
Labor Progress Note - Teri Mckinney 39 y.o. female MRN: [id number]    Unit/Bed#: -01 Encounter: [id number]       Contraction Frequency (minutes): irregular  Contraction Intensity: Mild/Moderate  Uterine Activity Characteristics: Occasional  Cervical Dilation: 2-3        Cervical Effacement: 30  Fetal Station: Ballotable  Baseline Rate (FHR): 125 bpm  Fetal Heart Rate (FHT): 130 BPM  FHR Category: 1             Vital Signs:   Vitals:    04/18/25 1900   BP:    Pulse: 93   Resp:    Temp:    SpO2: 99%       25 mcg cytotec placed vaginally. FHT notable for occasional periods for what appears to be in 60s, but FHT audibly in 120s while in the room with monitor halving the rate. Adjustment of monitor confirms FHT in 120s. Of note, most recent CMP with small bump in Cr (0.78 -> 0.93). Will adjust Mag rate to 1 g/hr and continue q6 hr labs. D/w Dr. Malone.     America James MD 4/18/2025 8:27 PM

## 2025-04-19 NOTE — OB LABOR/OXYTOCIN SAFETY PROGRESS
Labor Progress Note - Teri Mckinney 39 y.o. female MRN: [id number]    Unit/Bed#: -01 Encounter: [id number]       Contraction Frequency (minutes): 0  Contraction Intensity: Mild  Uterine Activity Characteristics: Occasional (ctx appear picking up inverted)  Cervical Dilation: 2-3        Cervical Effacement: 30  Fetal Station: Ballotable  Baseline Rate (FHR): 120 bpm  Fetal Heart Rate (FHT): 127 BPM  FHR Category: 2               Vital Signs:   Vitals:    04/19/25 0001   BP:    Pulse: 94   Resp: 18   Temp: 97.8 °F (36.6 °C)   SpO2: 97%     FHT notable for spontaneous deceleration. FHT difficult to trace but audibly in 120s with maternal repositioning. SVE with minimal change as above. Plan to allow for at least one hour of category 1 FHT, then administer another dose of vaginal cytotec 25 mcg. BP immediately after check severe in setting of extreme intolerance to checks. Will f/u BP and treat PRN. Plan d/w Dr. Malone.    America James MD 4/19/2025 12:31 AM

## 2025-04-20 LAB
ALBUMIN SERPL BCG-MCNC: 2.7 G/DL (ref 3.5–5)
ALBUMIN SERPL BCG-MCNC: 2.7 G/DL (ref 3.5–5)
ALBUMIN SERPL BCG-MCNC: 2.9 G/DL (ref 3.5–5)
ALBUMIN SERPL BCG-MCNC: 2.9 G/DL (ref 3.5–5)
ALP SERPL-CCNC: 102 U/L (ref 34–104)
ALP SERPL-CCNC: 102 U/L (ref 34–104)
ALP SERPL-CCNC: 107 U/L (ref 34–104)
ALP SERPL-CCNC: 114 U/L (ref 34–104)
ALT SERPL W P-5'-P-CCNC: 108 U/L (ref 7–52)
ALT SERPL W P-5'-P-CCNC: 206 U/L (ref 7–52)
ALT SERPL W P-5'-P-CCNC: 23 U/L (ref 7–52)
ALT SERPL W P-5'-P-CCNC: 30 U/L (ref 7–52)
ANION GAP SERPL CALCULATED.3IONS-SCNC: 11 MMOL/L (ref 4–13)
ANION GAP SERPL CALCULATED.3IONS-SCNC: 11 MMOL/L (ref 4–13)
ANION GAP SERPL CALCULATED.3IONS-SCNC: 9 MMOL/L (ref 4–13)
ANION GAP SERPL CALCULATED.3IONS-SCNC: 9 MMOL/L (ref 4–13)
AST SERPL W P-5'-P-CCNC: 137 U/L (ref 13–39)
AST SERPL W P-5'-P-CCNC: 20 U/L (ref 13–39)
AST SERPL W P-5'-P-CCNC: 25 U/L (ref 13–39)
AST SERPL W P-5'-P-CCNC: 78 U/L (ref 13–39)
BILIRUB SERPL-MCNC: 0.31 MG/DL (ref 0.2–1)
BILIRUB SERPL-MCNC: 0.31 MG/DL (ref 0.2–1)
BILIRUB SERPL-MCNC: 0.32 MG/DL (ref 0.2–1)
BILIRUB SERPL-MCNC: 0.32 MG/DL (ref 0.2–1)
BUN SERPL-MCNC: 14 MG/DL (ref 5–25)
CALCIUM ALBUM COR SERPL-MCNC: 8.2 MG/DL (ref 8.3–10.1)
CALCIUM ALBUM COR SERPL-MCNC: 8.5 MG/DL (ref 8.3–10.1)
CALCIUM ALBUM COR SERPL-MCNC: 8.5 MG/DL (ref 8.3–10.1)
CALCIUM ALBUM COR SERPL-MCNC: 8.7 MG/DL (ref 8.3–10.1)
CALCIUM SERPL-MCNC: 7.3 MG/DL (ref 8.4–10.2)
CALCIUM SERPL-MCNC: 7.5 MG/DL (ref 8.4–10.2)
CALCIUM SERPL-MCNC: 7.6 MG/DL (ref 8.4–10.2)
CALCIUM SERPL-MCNC: 7.7 MG/DL (ref 8.4–10.2)
CHLORIDE SERPL-SCNC: 109 MMOL/L (ref 96–108)
CHLORIDE SERPL-SCNC: 111 MMOL/L (ref 96–108)
CO2 SERPL-SCNC: 16 MMOL/L (ref 21–32)
CO2 SERPL-SCNC: 17 MMOL/L (ref 21–32)
CO2 SERPL-SCNC: 18 MMOL/L (ref 21–32)
CO2 SERPL-SCNC: 18 MMOL/L (ref 21–32)
CREAT SERPL-MCNC: 0.9 MG/DL (ref 0.6–1.3)
CREAT SERPL-MCNC: 0.96 MG/DL (ref 0.6–1.3)
CREAT SERPL-MCNC: 1 MG/DL (ref 0.6–1.3)
CREAT SERPL-MCNC: 1.05 MG/DL (ref 0.6–1.3)
ERYTHROCYTE [DISTWIDTH] IN BLOOD BY AUTOMATED COUNT: 13.8 % (ref 11.6–15.1)
ERYTHROCYTE [DISTWIDTH] IN BLOOD BY AUTOMATED COUNT: 14 % (ref 11.6–15.1)
ERYTHROCYTE [DISTWIDTH] IN BLOOD BY AUTOMATED COUNT: 14.2 % (ref 11.6–15.1)
ERYTHROCYTE [DISTWIDTH] IN BLOOD BY AUTOMATED COUNT: 14.2 % (ref 11.6–15.1)
GFR SERPL CREATININE-BSD FRML MDRD: 66 ML/MIN/1.73SQ M
GFR SERPL CREATININE-BSD FRML MDRD: 70 ML/MIN/1.73SQ M
GFR SERPL CREATININE-BSD FRML MDRD: 74 ML/MIN/1.73SQ M
GFR SERPL CREATININE-BSD FRML MDRD: 80 ML/MIN/1.73SQ M
GLUCOSE SERPL-MCNC: 100 MG/DL (ref 65–140)
GLUCOSE SERPL-MCNC: 103 MG/DL (ref 65–140)
GLUCOSE SERPL-MCNC: 105 MG/DL (ref 65–140)
GLUCOSE SERPL-MCNC: 108 MG/DL (ref 65–140)
GLUCOSE SERPL-MCNC: 110 MG/DL (ref 65–140)
GLUCOSE SERPL-MCNC: 111 MG/DL (ref 65–140)
GLUCOSE SERPL-MCNC: 114 MG/DL (ref 65–140)
GLUCOSE SERPL-MCNC: 114 MG/DL (ref 65–140)
GLUCOSE SERPL-MCNC: 115 MG/DL (ref 65–140)
GLUCOSE SERPL-MCNC: 121 MG/DL (ref 65–140)
GLUCOSE SERPL-MCNC: 122 MG/DL (ref 65–140)
GLUCOSE SERPL-MCNC: 123 MG/DL (ref 65–140)
GLUCOSE SERPL-MCNC: 123 MG/DL (ref 65–140)
GLUCOSE SERPL-MCNC: 125 MG/DL (ref 65–140)
GLUCOSE SERPL-MCNC: 127 MG/DL (ref 65–140)
GLUCOSE SERPL-MCNC: 128 MG/DL (ref 65–140)
GLUCOSE SERPL-MCNC: 129 MG/DL (ref 65–140)
GLUCOSE SERPL-MCNC: 130 MG/DL (ref 65–140)
GLUCOSE SERPL-MCNC: 138 MG/DL (ref 65–140)
GLUCOSE SERPL-MCNC: 99 MG/DL (ref 65–140)
HCT VFR BLD AUTO: 33.2 % (ref 34.8–46.1)
HCT VFR BLD AUTO: 33.5 % (ref 34.8–46.1)
HCT VFR BLD AUTO: 33.7 % (ref 34.8–46.1)
HCT VFR BLD AUTO: 34 % (ref 34.8–46.1)
HGB BLD-MCNC: 10.7 G/DL (ref 11.5–15.4)
HGB BLD-MCNC: 10.9 G/DL (ref 11.5–15.4)
HGB BLD-MCNC: 11 G/DL (ref 11.5–15.4)
HGB BLD-MCNC: 11 G/DL (ref 11.5–15.4)
MAGNESIUM SERPL-MCNC: 5.1 MG/DL (ref 1.9–2.7)
MAGNESIUM SERPL-MCNC: 5.2 MG/DL (ref 1.9–2.7)
MAGNESIUM SERPL-MCNC: 5.3 MG/DL (ref 1.9–2.7)
MAGNESIUM SERPL-MCNC: 5.4 MG/DL (ref 1.9–2.7)
MCH RBC QN AUTO: 30.2 PG (ref 26.8–34.3)
MCH RBC QN AUTO: 30.6 PG (ref 26.8–34.3)
MCH RBC QN AUTO: 30.6 PG (ref 26.8–34.3)
MCH RBC QN AUTO: 30.7 PG (ref 26.8–34.3)
MCHC RBC AUTO-ENTMCNC: 31.9 G/DL (ref 31.4–37.4)
MCHC RBC AUTO-ENTMCNC: 32.4 G/DL (ref 31.4–37.4)
MCHC RBC AUTO-ENTMCNC: 32.6 G/DL (ref 31.4–37.4)
MCHC RBC AUTO-ENTMCNC: 32.8 G/DL (ref 31.4–37.4)
MCV RBC AUTO: 94 FL (ref 82–98)
MCV RBC AUTO: 95 FL (ref 82–98)
PLATELET # BLD AUTO: 276 THOUSANDS/UL (ref 149–390)
PLATELET # BLD AUTO: 282 THOUSANDS/UL (ref 149–390)
PLATELET # BLD AUTO: 287 THOUSANDS/UL (ref 149–390)
PLATELET # BLD AUTO: 292 THOUSANDS/UL (ref 149–390)
PMV BLD AUTO: 10.7 FL (ref 8.9–12.7)
PMV BLD AUTO: 10.7 FL (ref 8.9–12.7)
PMV BLD AUTO: 10.8 FL (ref 8.9–12.7)
PMV BLD AUTO: 10.9 FL (ref 8.9–12.7)
POTASSIUM SERPL-SCNC: 3.8 MMOL/L (ref 3.5–5.3)
POTASSIUM SERPL-SCNC: 4.2 MMOL/L (ref 3.5–5.3)
PROT SERPL-MCNC: 5.3 G/DL (ref 6.4–8.4)
PROT SERPL-MCNC: 5.6 G/DL (ref 6.4–8.4)
PROT SERPL-MCNC: 5.8 G/DL (ref 6.4–8.4)
PROT SERPL-MCNC: 5.8 G/DL (ref 6.4–8.4)
RBC # BLD AUTO: 3.54 MILLION/UL (ref 3.81–5.12)
RBC # BLD AUTO: 3.55 MILLION/UL (ref 3.81–5.12)
RBC # BLD AUTO: 3.59 MILLION/UL (ref 3.81–5.12)
RBC # BLD AUTO: 3.6 MILLION/UL (ref 3.81–5.12)
SODIUM SERPL-SCNC: 136 MMOL/L (ref 135–147)
SODIUM SERPL-SCNC: 136 MMOL/L (ref 135–147)
SODIUM SERPL-SCNC: 137 MMOL/L (ref 135–147)
SODIUM SERPL-SCNC: 138 MMOL/L (ref 135–147)
WBC # BLD AUTO: 12.68 THOUSAND/UL (ref 4.31–10.16)
WBC # BLD AUTO: 12.9 THOUSAND/UL (ref 4.31–10.16)
WBC # BLD AUTO: 13.28 THOUSAND/UL (ref 4.31–10.16)
WBC # BLD AUTO: 13.56 THOUSAND/UL (ref 4.31–10.16)

## 2025-04-20 PROCEDURE — 83735 ASSAY OF MAGNESIUM: CPT | Performed by: STUDENT IN AN ORGANIZED HEALTH CARE EDUCATION/TRAINING PROGRAM

## 2025-04-20 PROCEDURE — 80053 COMPREHEN METABOLIC PANEL: CPT | Performed by: STUDENT IN AN ORGANIZED HEALTH CARE EDUCATION/TRAINING PROGRAM

## 2025-04-20 PROCEDURE — 85027 COMPLETE CBC AUTOMATED: CPT | Performed by: STUDENT IN AN ORGANIZED HEALTH CARE EDUCATION/TRAINING PROGRAM

## 2025-04-20 PROCEDURE — 82948 REAGENT STRIP/BLOOD GLUCOSE: CPT

## 2025-04-20 RX ORDER — NIFEDIPINE 30 MG/1
30 TABLET, EXTENDED RELEASE ORAL
Status: DISCONTINUED | OUTPATIENT
Start: 2025-04-20 | End: 2025-04-21

## 2025-04-20 RX ORDER — LABETALOL HYDROCHLORIDE 5 MG/ML
20 INJECTION, SOLUTION INTRAVENOUS ONCE
Status: COMPLETED | OUTPATIENT
Start: 2025-04-20 | End: 2025-04-21

## 2025-04-20 RX ORDER — LIDOCAINE HYDROCHLORIDE AND EPINEPHRINE 15; 5 MG/ML; UG/ML
INJECTION, SOLUTION EPIDURAL
Status: COMPLETED | OUTPATIENT
Start: 2025-04-20 | End: 2025-04-20

## 2025-04-20 RX ORDER — FAMOTIDINE 10 MG/ML
20 INJECTION, SOLUTION INTRAVENOUS EVERY 12 HOURS SCHEDULED
Status: DISCONTINUED | OUTPATIENT
Start: 2025-04-20 | End: 2025-04-21

## 2025-04-20 RX ADMIN — NIFEDIPINE 60 MG: 30 TABLET, FILM COATED, EXTENDED RELEASE ORAL at 08:02

## 2025-04-20 RX ADMIN — DEXTROSE AND SODIUM CHLORIDE 50 ML/HR: 5; .9 INJECTION, SOLUTION INTRAVENOUS at 09:06

## 2025-04-20 RX ADMIN — ROPIVACAINE HYDROCHLORIDE: 2 INJECTION, SOLUTION EPIDURAL; INFILTRATION at 10:04

## 2025-04-20 RX ADMIN — SODIUM CHLORIDE 50 ML/HR: 0.9 INJECTION, SOLUTION INTRAVENOUS at 11:31

## 2025-04-20 RX ADMIN — ROPIVACAINE HYDROCHLORIDE: 2 INJECTION, SOLUTION EPIDURAL; INFILTRATION at 17:50

## 2025-04-20 RX ADMIN — MAGNESIUM SULFATE IN WATER 1 G/HR: 20 INJECTION, SOLUTION INTRAVENOUS at 16:01

## 2025-04-20 RX ADMIN — SODIUM CHLORIDE 0.5 UNITS/HR: 9 INJECTION, SOLUTION INTRAVENOUS at 20:29

## 2025-04-20 RX ADMIN — OXYTOCIN 30 MILLI-UNITS/MIN: 10 INJECTION INTRAVENOUS at 20:11

## 2025-04-20 RX ADMIN — ONDANSETRON 4 MG: 2 INJECTION INTRAMUSCULAR; INTRAVENOUS at 11:31

## 2025-04-20 RX ADMIN — NIFEDIPINE 30 MG: 30 TABLET, FILM COATED, EXTENDED RELEASE ORAL at 23:23

## 2025-04-20 RX ADMIN — FAMOTIDINE 20 MG: 10 INJECTION INTRAVENOUS at 14:31

## 2025-04-20 RX ADMIN — CALCIUM CARBONATE 1000 MG: 500 TABLET, CHEWABLE ORAL at 00:27

## 2025-04-20 RX ADMIN — LIDOCAINE HYDROCHLORIDE AND EPINEPHRINE 3 ML: 15; 5 INJECTION, SOLUTION EPIDURAL at 09:57

## 2025-04-20 RX ADMIN — CALCIUM CARBONATE 1000 MG: 500 TABLET, CHEWABLE ORAL at 11:31

## 2025-04-20 NOTE — OB LABOR/OXYTOCIN SAFETY PROGRESS
Oxytocin Safety Progress Check Note - Teri Mckinney 39 y.o. female MRN: [id number]    Unit/Bed#: -01 Encounter: [id number]    Dose (bryant-units/min) Oxytocin: 18 bryant-units/min  Contraction Frequency (minutes): 2-5  Contraction Intensity: Mild/Moderate  Uterine Activity Characteristics: Irregular  Cervical Dilation: 4        Cervical Effacement: 50  Fetal Station: Ballotable  Baseline Rate (FHR): 125 bpm  Fetal Heart Rate (FHT): 132 BPM  FHR Category: 1               Vital Signs:   Vitals:    04/20/25 0906   BP: 136/73   Pulse: 81   Resp:    Temp:    SpO2:        Patient due for check. Discussed that next step in IOL process is AROM. Patient desires epidural prior to AROM. Will obtain epidural first then plan for AROM if fetal station appropriate. Check deferred.     America James MD 4/20/2025 9:15 AM

## 2025-04-20 NOTE — ANESTHESIA PREPROCEDURE EVALUATION
Procedure:  LABOR ANALGESIA    Relevant Problems   CARDIO   (+) Severe preeclampsia      GYN   (+) 34 weeks gestation of pregnancy      Obstetrics/Gynecology   (+) AMA (advanced maternal age) primigravida 35+, third trimester   (+) Diet controlled gestational diabetes mellitus (GDM) in third trimester   (+) Pregnancy resulting from in vitro fertilization in third trimester   (+) Severe obesity due to excess calories affecting pregnancy in third trimester (HCC)        Lab Results   Component Value Date    WBC 13.56 (H) 04/20/2025    HGB 11.0 (L) 04/20/2025    HCT 33.7 (L) 04/20/2025    MCV 94 04/20/2025     04/20/2025     Lab Results   Component Value Date    SODIUM 136 04/20/2025    K 3.8 04/20/2025     (H) 04/20/2025    CO2 18 (L) 04/20/2025    BUN 14 04/20/2025    CREATININE 0.90 04/20/2025    GLUC 123 04/20/2025    CALCIUM 7.6 (L) 04/20/2025         Physical Exam    Airway    Mallampati score: III  TM Distance: >3 FB  Neck ROM: full     Dental   No notable dental hx     Cardiovascular  Rhythm: regular, Rate: normal, Cardiovascular exam normal    Pulmonary  Pulmonary exam normal Breath sounds clear to auscultation    Other Findings  post-pubertal.      Anesthesia Plan  ASA Score- 3     Anesthesia Type- epidural with ASA Monitors.         Additional Monitors:     Airway Plan:            Plan Factors-Exercise tolerance (METS): >4 METS.    Chart reviewed.   Existing labs reviewed. Patient summary reviewed.    Patient is not a current smoker. Patient not instructed to abstain from smoking on day of procedure. Patient did not smoke on day of surgery.            Induction-     Postoperative Plan-     Perioperative Resuscitation Plan - Level 1 - Full Code.       Informed Consent- Anesthetic plan and risks discussed with patient.  I personally reviewed this patient with the CRNA. Discussed and agreed on the Anesthesia Plan with the CRNA..      NPO Status:  No vitals data found for the desired time  range.

## 2025-04-20 NOTE — OB LABOR/OXYTOCIN SAFETY PROGRESS
Oxytocin Safety Progress Check Note - Teri Mckinney 39 y.o. female MRN: [id number]    Unit/Bed#: -01 Encounter: [id number]    Dose (bryant-units/min) Oxytocin: 8 bryant-units/min  Contraction Frequency (minutes): 4-5  Contraction Intensity: Mild/Moderate  Uterine Activity Characteristics: Irritability  Cervical Dilation: 4        Cervical Effacement: 30  Fetal Station: Ballotable  Baseline Rate (FHR): 125 bpm  Fetal Heart Rate (FHT): 125 BPM  FHR Category: I               Vital Signs:   Vitals:    04/19/25 2213   BP: 131/58   Pulse: 90   Resp:    Temp:    SpO2:        Notes/comments:   Patient comfortable. FHT category I. SVE as above. Plan to continue pitocin titration. Will reassess in 2 hours or sooner if clinically indicated. Dr. Malone notified.     Francheska Davis MD 4/19/2025 10:39 PM

## 2025-04-20 NOTE — OB LABOR/OXYTOCIN SAFETY PROGRESS
Oxytocin Safety Progress Check Note - Teri Mckinney 39 y.o. female MRN: [id number]    Unit/Bed#: -01 Encounter: [id number]    Dose (bryant-units/min) Oxytocin: 24 bryant-units/min  Contraction Frequency (minutes): 4-5  Contraction Intensity: Mild/Moderate  Uterine Activity Characteristics: Irregular  Cervical Dilation: 4        Cervical Effacement: 50  Fetal Station: Ballotable  Baseline Rate (FHR): 125 bpm  Fetal Heart Rate (FHT): 128 BPM  FHR Category: 1           Vital Signs:   Vitals:    04/20/25 1050   BP: 131/67   Pulse: 81   Resp:    Temp:    SpO2:        Notes/comments:   Pt with less pain after epidural placement. SVE as above. Given no change in SVE, recommend AROM. Patient was agreeable and AROM was performed for clear fluid. Will continue titrating pitocin     Dr. Hever Arreola MD 4/20/2025 11:40 AM

## 2025-04-20 NOTE — QUICK NOTE
Patient seen at bedside.   Lungs auscultated bilaterally. Posterior breath sounds normal bilaterally. Some mild expiratory wheezing anteriorly.   Patient asymptomatic.   Will continue to monitor.     Arlet Davila MD  OBGYN PGY-3  4/20/2025 7:12 PM

## 2025-04-20 NOTE — OB LABOR/OXYTOCIN SAFETY PROGRESS
Oxytocin Safety Progress Check Note - Teri Mckinney 39 y.o. female MRN: [id number]    Unit/Bed#: -01 Encounter: [id number]    Dose (bryant-units/min) Oxytocin: 28 bryant-units/min  Contraction Frequency (minutes): difficult to trace  Contraction Intensity: Mild/Moderate  Uterine Activity Characteristics: Irregular  Cervical Dilation: 4        Cervical Effacement: 50  Fetal Station: Ballotable  Baseline Rate (FHR): 130 bpm  Fetal Heart Rate (FHT): 132 BPM  FHR Category: 1           Vital Signs:   Vitals:    04/20/25 1500   BP:    Pulse:    Resp:    Temp: 97.7 °F (36.5 °C)   SpO2:        Notes/comments:   Pt is resting comfortably. SVE as above. FHT cat 1 brian q5 mins. IUPC placed to monitor contractions.    Dr. Kathleen aware    Gosia Arreola MD 4/20/2025 4:04 PM

## 2025-04-20 NOTE — ANESTHESIA PROCEDURE NOTES
Epidural Block    Start time: 4/20/2025 9:56 AM  Reason for block: procedure for pain  Staffing  Performed by: Mariano Pierce MD  Authorized by: Mariano Pierce MD    Preanesthetic Checklist  Completed: patient identified, IV checked, site marked, risks and benefits discussed, surgical consent, monitors and equipment checked, pre-op evaluation and timeout performed  Epidural  Patient position: sitting  Prep: ChloraPrep  Sedation Level: no sedation  Patient monitoring: frequent blood pressure checks, continuous pulse oximetry and heart rate  Approach: midline  Location: lumbar, L2-3  Injection technique: ROSANA saline  Needle  Needle type: Tuohy   Needle gauge: 17 G  Needle insertion depth: 9 cm  Catheter type: multi-orifice  Catheter size: 20 G  Catheter at skin depth: 15 cm  Catheter securement method: clear occlusive dressing, tape and liquid medical adhesive  Test dose: negativelidocaine-epinephrine (XYLOCAINE-MPF/EPINEPHRINE) 1.5 %-1:200,000 injection 3 mL - Epidural   3 mL - 4/20/2025 9:57:00 AM  Assessment  Sensory level: T10  Number of attempts: 2negative aspiration for CSF, negative aspiration for heme and no paresthesia on injection  patient tolerated the procedure well with no immediate complications  Additional Notes  Initial placement at L3-L4 with ROSANA @8 cm. Heme in catheter after initial insertion and after pulling back to 14 cm. Catheter removed. 2nd attempt uneventful with ROSANA at 9 cm and negative aspiration

## 2025-04-20 NOTE — PLAN OF CARE
Problem: PAIN - ADULT  Goal: Verbalizes/displays adequate comfort level or baseline comfort level  Description: Interventions:- Encourage patient to monitor pain and request assistance- Assess pain using appropriate pain scale- Administer analgesics based on type and severity of pain and evaluate response- Implement non-pharmacological measures as appropriate and evaluate response- Consider cultural and social influences on pain and pain management- Notify physician/advanced practitioner if interventions unsuccessful or patient reports new pain  Outcome: Progressing     Problem: INFECTION - ADULT  Goal: Absence or prevention of progression during hospitalization  Description: INTERVENTIONS:- Assess and monitor for signs and symptoms of infection- Monitor lab/diagnostic results- Monitor all insertion sites, i.e. indwelling lines, tubes, and drains- Monitor endotracheal if appropriate and nasal secretions for changes in amount and color- [place] appropriate cooling/warming therapies per order- Administer medications as ordered- Instruct and encourage patient and family to use good hand hygiene technique- Identify and instruct in appropriate isolation precautions for identified infection/condition  Outcome: Progressing  Goal: Absence of fever/infection during neutropenic period  Description: INTERVENTIONS:- Monitor WBC  Outcome: Progressing     Problem: SAFETY ADULT  Goal: Patient will remain free of falls  Description: INTERVENTIONS:- Educate patient/family on patient safety including physical limitations- Instruct patient to call for assistance with activity - Consult OT/PT to assist with strengthening/mobility - Keep Call bell within reach- Keep bed low and locked with side rails adjusted as appropriate- Keep care items and personal belongings within reach- Initiate and maintain comfort rounds  Outcome: Progressing  Goal: Maintain or return to baseline ADL function  Description: INTERVENTIONS:-  Assess patient's  ability to carry out ADLs; assess patient's baseline for ADL function and identify physical deficits which impact ability to perform ADLs (bathing, care of mouth/teeth, toileting, grooming, dressing, etc.)- Assess/evaluate cause of self-care deficits - Assess range of motion- Assess patient's mobility; develop plan if impaired- Assess patient's need for assistive devices and provide as appropriate- Encourage maximum independence but intervene and supervise when necessary- Involve family in performance of ADLs- Assess for home care needs following discharge - Consider OT consult to assist with ADL evaluation and planning for discharge- Provide patient education as appropriate  Outcome: Progressing  Goal: Maintains/Returns to pre admission functional level  Description: INTERVENTIONS:- Perform AM-PAC 6 Click Basic Mobility/ Daily Activity assessment daily.- Set and communicate daily mobility goal to care team and patient/family/caregiver. - Collaborate with rehabilitation services on mobility goals if consulted  Outcome: Progressing     Problem: Knowledge Deficit  Goal: Patient/family/caregiver demonstrates understanding of disease process, treatment plan, medications, and discharge instructions  Description: Complete learning assessment and assess knowledge base.Interventions:- Provide teaching at level of understanding- Provide teaching via preferred learning methods  Outcome: Progressing  Goal: Verbalizes understanding of labor plan  Description: Assess patient/family/caregiver's baseline knowledge level and ability to understand information.  Provide education via patient/family/caregiver's preferred learning method at appropriate level of understanding. 1. Provide teaching at level of understanding.2. Provide teaching via preferred learning method(s).  Outcome: Progressing     Problem: DISCHARGE PLANNING  Goal: Discharge to home or other facility with appropriate resources  Description: INTERVENTIONS:- Identify  barriers to discharge w/patient and caregiver- Arrange for needed discharge resources and transportation as appropriate- Identify discharge learning needs (meds, wound care, etc.)- Arrange for interpretive services to assist at discharge as needed- Refer to Case Management Department for coordinating discharge planning if the patient needs post-hospital services based on physician/advanced practitioner order or complex needs related to functional status, cognitive ability, or social support system  Outcome: Progressing     Problem: BIRTH - VAGINAL/ SECTION  Goal: Fetal and maternal status remain reassuring during the birth process  Description: INTERVENTIONS:- Monitor vital signs- Monitor fetal heart rate- Monitor uterine activity- Monitor labor progression (vaginal delivery)- DVT prophylaxis- Antibiotic prophylaxis  Outcome: Progressing  Goal: Emotionally satisfying birthing experience for mother/fetus  Description: Interventions:- Assess, plan, implement and evaluate the nursing care given to the patient in labor- Advocate the philosophy that each childbirth experience is a unique experience and support the family's chosen level of involvement and control during the labor process - Actively participate in both the patient's and family's teaching of the birth process- Consider cultural, [place] and age-specific factors and plan care for the patient in labor  Outcome: Progressing     Problem: Labor & Delivery  Goal: Manages discomfort  Description: Assess and monitor for signs and symptoms of discomfort.  Assess patient's pain level regularly and per hospital policy.  Administer medications as ordered. Support use of nonpharmacological methods to help control pain such as distraction, imagery, relaxation, and application of heat and cold.  Collaborate with interdisciplinary team and patient to determine appropriate pain management plan.1. Include patient in decisions related to comfort.2. Offer  non-pharmacological pain management interventions.3. Report ineffective pain management to physician.  Outcome: Progressing  Goal: Patient vital signs are stable  Description: 1. Assess vital signs - vaginal delivery.  Outcome: Progressing

## 2025-04-20 NOTE — OB LABOR/OXYTOCIN SAFETY PROGRESS
Oxytocin Safety Progress Check Note - Teri Mckinney 39 y.o. female MRN: [id number]    Unit/Bed#: -01 Encounter: [id number]    Dose (bryant-units/min) Oxytocin: 6 bryant-units/min  Contraction Frequency (minutes): difficult to trace, pt laying on her side  Contraction Intensity: Mild  Uterine Activity Characteristics: Irritability  Cervical Dilation: 3-4        Cervical Effacement: 30  Fetal Station: Ballotable  Baseline Rate (FHR): 130 bpm  Fetal Heart Rate (FHT): 132 BPM  FHR Category: I               Vital Signs:   Vitals:    04/19/25 1958   BP: 137/63   Pulse: 88   Resp:    Temp:    SpO2:        Notes/comments:   Pitocin started at 1800. Pt comfortable. SVE deferred at this time. Will reassess in 2 hours or sooner if clinically indicated. Dr. Malone aware.      Francheska Davis MD 4/19/2025 8:17 PM

## 2025-04-20 NOTE — PLAN OF CARE
Problem: PAIN - ADULT  Goal: Verbalizes/displays adequate comfort level or baseline comfort level  Description: Interventions:- Encourage patient to monitor pain and request assistance- Assess pain using appropriate pain scale- Administer analgesics based on type and severity of pain and evaluate response- Implement non-pharmacological measures as appropriate and evaluate response- Consider cultural and social influences on pain and pain management- Notify physician/advanced practitioner if interventions unsuccessful or patient reports new pain  Outcome: Progressing     Problem: INFECTION - ADULT  Goal: Absence or prevention of progression during hospitalization  Description: INTERVENTIONS:- Assess and monitor for signs and symptoms of infection- Monitor lab/diagnostic results- Monitor all insertion sites, i.e. indwelling lines, tubes, and drains- Monitor endotracheal if appropriate and nasal secretions for changes in amount and color- [place] appropriate cooling/warming therapies per order- Administer medications as ordered- Instruct and encourage patient and family to use good hand hygiene technique- Identify and instruct in appropriate isolation precautions for identified infection/condition  Outcome: Progressing  Goal: Absence of fever/infection during neutropenic period  Description: INTERVENTIONS:- Monitor WBC  Outcome: Progressing     Problem: SAFETY ADULT  Goal: Patient will remain free of falls  Description: INTERVENTIONS:- Educate patient/family on patient safety including physical limitations- Instruct patient to call for assistance with activity - Consult OT/PT to assist with strengthening/mobility - Keep Call bell within reach- Keep bed low and locked with side rails adjusted as appropriate- Keep care items and personal belongings within reach- Initiate and maintain comfort rounds- Make Fall Risk Sign visible to staff. Apply yellow socks and bracelet for high fall risk patients- Consider moving  patient to room near nurses station  Outcome: Progressing  Goal: Maintain or return to baseline ADL function  Description: INTERVENTIONS:-  Assess patient's ability to carry out ADLs; assess patient's baseline for ADL function and identify physical deficits which impact ability to perform ADLs (bathing, care of mouth/teeth, toileting, grooming, dressing, etc.)- Assess/evaluate cause of self-care deficits - Assess range of motion- Assess patient's mobility; develop plan if impaired- Assess patient's need for assistive devices and provide as appropriate- Encourage maximum independence but intervene and supervise when necessary- Involve family in performance of ADLs- Assess for home care needs following discharge - Consider OT consult to assist with ADL evaluation and planning for discharge- Provide patient education as appropriate  Outcome: Progressing  Goal: Maintains/Returns to pre admission functional level  Description: INTERVENTIONS:- Perform AM-PAC 6 Click Basic Mobility/ Daily Activity assessment daily.- Set and communicate daily mobility goal to care team and patient/family/caregiver. - Collaborate with rehabilitation services on mobility goals if consulted.  Outcome: Progressing     Problem: Knowledge Deficit  Goal: Patient/family/caregiver demonstrates understanding of disease process, treatment plan, medications, and discharge instructions  Description: Complete learning assessment and assess knowledge base.Interventions:- Provide teaching at level of understanding- Provide teaching via preferred learning methods  Outcome: Progressing  Goal: Verbalizes understanding of labor plan  Description: Assess patient/family/caregiver's baseline knowledge level and ability to understand information.  Provide education via patient/family/caregiver's preferred learning method at appropriate level of understanding. 1. Provide teaching at level of understanding.2. Provide teaching via preferred learning  method(s).  Outcome: Progressing     Problem: DISCHARGE PLANNING  Goal: Discharge to home or other facility with appropriate resources  Description: INTERVENTIONS:- Identify barriers to discharge w/patient and caregiver- Arrange for needed discharge resources and transportation as appropriate- Identify discharge learning needs (meds, wound care, etc.)- Arrange for interpretive services to assist at discharge as needed- Refer to Case Management Department for coordinating discharge planning if the patient needs post-hospital services based on physician/advanced practitioner order or complex needs related to functional status, cognitive ability, or social support system  Outcome: Progressing     Problem: Labor & Delivery  Goal: Manages discomfort  Description: Assess and monitor for signs and symptoms of discomfort.  Assess patient's pain level regularly and per hospital policy.  Administer medications as ordered. Support use of nonpharmacological methods to help control pain such as distraction, imagery, relaxation, and application of heat and cold.  Collaborate with interdisciplinary team and patient to determine appropriate pain management plan.1. Include patient in decisions related to comfort.2. Offer non-pharmacological pain management interventions.3. Report ineffective pain management to physician.  Outcome: Progressing  Goal: Patient vital signs are stable  Description: 1. Assess vital signs - vaginal delivery.  Outcome: Progressing     Problem: BIRTH - VAGINAL/ SECTION  Goal: Fetal and maternal status remain reassuring during the birth process  Description: INTERVENTIONS:- Monitor vital signs- Monitor fetal heart rate- Monitor uterine activity- Monitor labor progression (vaginal delivery)- DVT prophylaxis- Antibiotic prophylaxis  Outcome: Progressing  Goal: Emotionally satisfying birthing experience for mother/fetus  Description: Interventions:- Assess, plan, implement and evaluate the nursing care  given to the patient in labor- Advocate the philosophy that each childbirth experience is a unique experience and support the family's chosen level of involvement and control during the labor process - Actively participate in both the patient's and family's teaching of the birth process- Consider cultural, [place] and age-specific factors and plan care for the patient in labor  Outcome: Progressing

## 2025-04-21 PROBLEM — Z98.891 S/P PRIMARY LOW TRANSVERSE C-SECTION: Status: ACTIVE | Noted: 2025-04-16

## 2025-04-21 LAB
ALBUMIN SERPL BCG-MCNC: 2.6 G/DL (ref 3.5–5)
ALBUMIN SERPL BCG-MCNC: 2.6 G/DL (ref 3.5–5)
ALBUMIN SERPL BCG-MCNC: 2.7 G/DL (ref 3.5–5)
ALBUMIN SERPL BCG-MCNC: 2.8 G/DL (ref 3.5–5)
ALP SERPL-CCNC: 102 U/L (ref 34–104)
ALP SERPL-CCNC: 105 U/L (ref 34–104)
ALP SERPL-CCNC: 96 U/L (ref 34–104)
ALP SERPL-CCNC: 97 U/L (ref 34–104)
ALT SERPL W P-5'-P-CCNC: 146 U/L (ref 7–52)
ALT SERPL W P-5'-P-CCNC: 162 U/L (ref 7–52)
ALT SERPL W P-5'-P-CCNC: 181 U/L (ref 7–52)
ALT SERPL W P-5'-P-CCNC: 184 U/L (ref 7–52)
ANION GAP SERPL CALCULATED.3IONS-SCNC: 6 MMOL/L (ref 4–13)
ANION GAP SERPL CALCULATED.3IONS-SCNC: 7 MMOL/L (ref 4–13)
ANION GAP SERPL CALCULATED.3IONS-SCNC: 7 MMOL/L (ref 4–13)
ANION GAP SERPL CALCULATED.3IONS-SCNC: 9 MMOL/L (ref 4–13)
AST SERPL W P-5'-P-CCNC: 109 U/L (ref 13–39)
AST SERPL W P-5'-P-CCNC: 64 U/L (ref 13–39)
AST SERPL W P-5'-P-CCNC: 78 U/L (ref 13–39)
AST SERPL W P-5'-P-CCNC: 95 U/L (ref 13–39)
BASE EXCESS BLDCOA CALC-SCNC: -7.4 MMOL/L (ref 3–11)
BILIRUB SERPL-MCNC: 0.29 MG/DL (ref 0.2–1)
BILIRUB SERPL-MCNC: 0.31 MG/DL (ref 0.2–1)
BILIRUB SERPL-MCNC: 0.32 MG/DL (ref 0.2–1)
BILIRUB SERPL-MCNC: 0.32 MG/DL (ref 0.2–1)
BUN SERPL-MCNC: 13 MG/DL (ref 5–25)
BUN SERPL-MCNC: 14 MG/DL (ref 5–25)
CALCIUM ALBUM COR SERPL-MCNC: 8.3 MG/DL (ref 8.3–10.1)
CALCIUM ALBUM COR SERPL-MCNC: 8.4 MG/DL (ref 8.3–10.1)
CALCIUM ALBUM COR SERPL-MCNC: 8.4 MG/DL (ref 8.3–10.1)
CALCIUM ALBUM COR SERPL-MCNC: 8.7 MG/DL (ref 8.3–10.1)
CALCIUM SERPL-MCNC: 7.3 MG/DL (ref 8.4–10.2)
CALCIUM SERPL-MCNC: 7.3 MG/DL (ref 8.4–10.2)
CALCIUM SERPL-MCNC: 7.4 MG/DL (ref 8.4–10.2)
CALCIUM SERPL-MCNC: 7.6 MG/DL (ref 8.4–10.2)
CHLORIDE SERPL-SCNC: 107 MMOL/L (ref 96–108)
CHLORIDE SERPL-SCNC: 109 MMOL/L (ref 96–108)
CHLORIDE SERPL-SCNC: 111 MMOL/L (ref 96–108)
CHLORIDE SERPL-SCNC: 111 MMOL/L (ref 96–108)
CO2 SERPL-SCNC: 18 MMOL/L (ref 21–32)
CO2 SERPL-SCNC: 19 MMOL/L (ref 21–32)
CO2 SERPL-SCNC: 20 MMOL/L (ref 21–32)
CO2 SERPL-SCNC: 20 MMOL/L (ref 21–32)
CREAT SERPL-MCNC: 1.01 MG/DL (ref 0.6–1.3)
CREAT SERPL-MCNC: 1.05 MG/DL (ref 0.6–1.3)
CREAT SERPL-MCNC: 1.05 MG/DL (ref 0.6–1.3)
CREAT SERPL-MCNC: 1.06 MG/DL (ref 0.6–1.3)
ERYTHROCYTE [DISTWIDTH] IN BLOOD BY AUTOMATED COUNT: 13.9 % (ref 11.6–15.1)
ERYTHROCYTE [DISTWIDTH] IN BLOOD BY AUTOMATED COUNT: 14 % (ref 11.6–15.1)
GFR SERPL CREATININE-BSD FRML MDRD: 65 ML/MIN/1.73SQ M
GFR SERPL CREATININE-BSD FRML MDRD: 66 ML/MIN/1.73SQ M
GFR SERPL CREATININE-BSD FRML MDRD: 66 ML/MIN/1.73SQ M
GFR SERPL CREATININE-BSD FRML MDRD: 69 ML/MIN/1.73SQ M
GLUCOSE SERPL-MCNC: 102 MG/DL (ref 65–140)
GLUCOSE SERPL-MCNC: 106 MG/DL (ref 65–140)
GLUCOSE SERPL-MCNC: 109 MG/DL (ref 65–140)
GLUCOSE SERPL-MCNC: 111 MG/DL (ref 65–140)
GLUCOSE SERPL-MCNC: 115 MG/DL (ref 65–140)
GLUCOSE SERPL-MCNC: 116 MG/DL (ref 65–140)
GLUCOSE SERPL-MCNC: 122 MG/DL (ref 65–140)
GLUCOSE SERPL-MCNC: 133 MG/DL (ref 65–140)
GLUCOSE SERPL-MCNC: 142 MG/DL (ref 65–140)
GLUCOSE SERPL-MCNC: 163 MG/DL (ref 65–140)
HCO3 BLDCOA-SCNC: 19.6 MMOL/L (ref 17.3–27.3)
HCT VFR BLD AUTO: 31.4 % (ref 34.8–46.1)
HCT VFR BLD AUTO: 31.9 % (ref 34.8–46.1)
HCT VFR BLD AUTO: 33.1 % (ref 34.8–46.1)
HCT VFR BLD AUTO: 34.1 % (ref 34.8–46.1)
HGB BLD-MCNC: 10.1 G/DL (ref 11.5–15.4)
HGB BLD-MCNC: 10.5 G/DL (ref 11.5–15.4)
HGB BLD-MCNC: 10.8 G/DL (ref 11.5–15.4)
HGB BLD-MCNC: 10.8 G/DL (ref 11.5–15.4)
MAGNESIUM SERPL-MCNC: 5.1 MG/DL (ref 1.9–2.7)
MAGNESIUM SERPL-MCNC: 5.2 MG/DL (ref 1.9–2.7)
MAGNESIUM SERPL-MCNC: 5.2 MG/DL (ref 1.9–2.7)
MAGNESIUM SERPL-MCNC: 5.3 MG/DL (ref 1.9–2.7)
MCH RBC QN AUTO: 29.5 PG (ref 26.8–34.3)
MCH RBC QN AUTO: 30 PG (ref 26.8–34.3)
MCH RBC QN AUTO: 30.4 PG (ref 26.8–34.3)
MCH RBC QN AUTO: 30.5 PG (ref 26.8–34.3)
MCHC RBC AUTO-ENTMCNC: 31.7 G/DL (ref 31.4–37.4)
MCHC RBC AUTO-ENTMCNC: 32.2 G/DL (ref 31.4–37.4)
MCHC RBC AUTO-ENTMCNC: 32.6 G/DL (ref 31.4–37.4)
MCHC RBC AUTO-ENTMCNC: 32.9 G/DL (ref 31.4–37.4)
MCV RBC AUTO: 93 FL (ref 82–98)
MCV RBC AUTO: 94 FL (ref 82–98)
O2 CT VFR BLDCOA CALC: 5 ML/DL
OXYHGB MFR BLDCOA: 27.5 %
PCO2 BLDCOA: 44.9 MM[HG] (ref 30–60)
PH BLDCOA: 7.26 [PH] (ref 7.23–7.43)
PLATELET # BLD AUTO: 261 THOUSANDS/UL (ref 149–390)
PLATELET # BLD AUTO: 263 THOUSANDS/UL (ref 149–390)
PLATELET # BLD AUTO: 279 THOUSANDS/UL (ref 149–390)
PLATELET # BLD AUTO: 286 THOUSANDS/UL (ref 149–390)
PMV BLD AUTO: 10.4 FL (ref 8.9–12.7)
PMV BLD AUTO: 10.6 FL (ref 8.9–12.7)
PMV BLD AUTO: 10.6 FL (ref 8.9–12.7)
PMV BLD AUTO: 10.7 FL (ref 8.9–12.7)
PO2 BLDCOA: 14.9 MM HG (ref 5–25)
POTASSIUM SERPL-SCNC: 3.7 MMOL/L (ref 3.5–5.3)
POTASSIUM SERPL-SCNC: 4.1 MMOL/L (ref 3.5–5.3)
POTASSIUM SERPL-SCNC: 4.4 MMOL/L (ref 3.5–5.3)
POTASSIUM SERPL-SCNC: 4.4 MMOL/L (ref 3.5–5.3)
PROT SERPL-MCNC: 5.1 G/DL (ref 6.4–8.4)
PROT SERPL-MCNC: 5.2 G/DL (ref 6.4–8.4)
PROT SERPL-MCNC: 5.4 G/DL (ref 6.4–8.4)
PROT SERPL-MCNC: 5.5 G/DL (ref 6.4–8.4)
RBC # BLD AUTO: 3.37 MILLION/UL (ref 3.81–5.12)
RBC # BLD AUTO: 3.45 MILLION/UL (ref 3.81–5.12)
RBC # BLD AUTO: 3.54 MILLION/UL (ref 3.81–5.12)
RBC # BLD AUTO: 3.66 MILLION/UL (ref 3.81–5.12)
SODIUM SERPL-SCNC: 134 MMOL/L (ref 135–147)
SODIUM SERPL-SCNC: 136 MMOL/L (ref 135–147)
SODIUM SERPL-SCNC: 136 MMOL/L (ref 135–147)
SODIUM SERPL-SCNC: 138 MMOL/L (ref 135–147)
WBC # BLD AUTO: 14.35 THOUSAND/UL (ref 4.31–10.16)
WBC # BLD AUTO: 17.26 THOUSAND/UL (ref 4.31–10.16)
WBC # BLD AUTO: 18.2 THOUSAND/UL (ref 4.31–10.16)
WBC # BLD AUTO: 19.15 THOUSAND/UL (ref 4.31–10.16)

## 2025-04-21 PROCEDURE — 59510 CESAREAN DELIVERY: CPT | Performed by: OBSTETRICS & GYNECOLOGY

## 2025-04-21 PROCEDURE — 4A1H7CZ MONITORING OF PRODUCTS OF CONCEPTION, CARDIAC RATE, VIA NATURAL OR ARTIFICIAL OPENING: ICD-10-PCS | Performed by: OBSTETRICS & GYNECOLOGY

## 2025-04-21 PROCEDURE — 83735 ASSAY OF MAGNESIUM: CPT | Performed by: STUDENT IN AN ORGANIZED HEALTH CARE EDUCATION/TRAINING PROGRAM

## 2025-04-21 PROCEDURE — 3E0P7VZ INTRODUCTION OF HORMONE INTO FEMALE REPRODUCTIVE, VIA NATURAL OR ARTIFICIAL OPENING: ICD-10-PCS | Performed by: OBSTETRICS & GYNECOLOGY

## 2025-04-21 PROCEDURE — 82805 BLOOD GASES W/O2 SATURATION: CPT | Performed by: OBSTETRICS & GYNECOLOGY

## 2025-04-21 PROCEDURE — 82948 REAGENT STRIP/BLOOD GLUCOSE: CPT

## 2025-04-21 PROCEDURE — 85027 COMPLETE CBC AUTOMATED: CPT

## 2025-04-21 PROCEDURE — 83735 ASSAY OF MAGNESIUM: CPT

## 2025-04-21 PROCEDURE — 10H07YZ INSERTION OF OTHER DEVICE INTO PRODUCTS OF CONCEPTION, VIA NATURAL OR ARTIFICIAL OPENING: ICD-10-PCS | Performed by: OBSTETRICS & GYNECOLOGY

## 2025-04-21 PROCEDURE — 3E0DXGC INTRODUCTION OF OTHER THERAPEUTIC SUBSTANCE INTO MOUTH AND PHARYNX, EXTERNAL APPROACH: ICD-10-PCS | Performed by: OBSTETRICS & GYNECOLOGY

## 2025-04-21 PROCEDURE — 80053 COMPREHEN METABOLIC PANEL: CPT

## 2025-04-21 PROCEDURE — 3E033VJ INTRODUCTION OF OTHER HORMONE INTO PERIPHERAL VEIN, PERCUTANEOUS APPROACH: ICD-10-PCS | Performed by: OBSTETRICS & GYNECOLOGY

## 2025-04-21 PROCEDURE — 88307 TISSUE EXAM BY PATHOLOGIST: CPT | Performed by: PATHOLOGY

## 2025-04-21 PROCEDURE — 80053 COMPREHEN METABOLIC PANEL: CPT | Performed by: STUDENT IN AN ORGANIZED HEALTH CARE EDUCATION/TRAINING PROGRAM

## 2025-04-21 PROCEDURE — 4A1HXCZ MONITORING OF PRODUCTS OF CONCEPTION, CARDIAC RATE, EXTERNAL APPROACH: ICD-10-PCS | Performed by: OBSTETRICS & GYNECOLOGY

## 2025-04-21 PROCEDURE — 85027 COMPLETE CBC AUTOMATED: CPT | Performed by: STUDENT IN AN ORGANIZED HEALTH CARE EDUCATION/TRAINING PROGRAM

## 2025-04-21 PROCEDURE — 10907ZC DRAINAGE OF AMNIOTIC FLUID, THERAPEUTIC FROM PRODUCTS OF CONCEPTION, VIA NATURAL OR ARTIFICIAL OPENING: ICD-10-PCS | Performed by: OBSTETRICS & GYNECOLOGY

## 2025-04-21 RX ORDER — CEFAZOLIN SODIUM 2 G/50ML
2000 SOLUTION INTRAVENOUS EVERY 8 HOURS
Status: DISCONTINUED | OUTPATIENT
Start: 2025-04-21 | End: 2025-04-21

## 2025-04-21 RX ORDER — ACETAMINOPHEN 325 MG/1
650 TABLET ORAL EVERY 6 HOURS SCHEDULED
Status: DISCONTINUED | OUTPATIENT
Start: 2025-04-21 | End: 2025-04-22

## 2025-04-21 RX ORDER — FUROSEMIDE 20 MG/1
20 TABLET ORAL DAILY
Status: COMPLETED | OUTPATIENT
Start: 2025-04-21 | End: 2025-04-25

## 2025-04-21 RX ORDER — DIPHENHYDRAMINE HYDROCHLORIDE 50 MG/ML
25 INJECTION, SOLUTION INTRAMUSCULAR; INTRAVENOUS EVERY 6 HOURS PRN
Status: DISCONTINUED | OUTPATIENT
Start: 2025-04-21 | End: 2025-04-25 | Stop reason: HOSPADM

## 2025-04-21 RX ORDER — HYDROMORPHONE HCL/PF 1 MG/ML
0.5 SYRINGE (ML) INJECTION EVERY 2 HOUR PRN
Status: DISCONTINUED | OUTPATIENT
Start: 2025-04-21 | End: 2025-04-22

## 2025-04-21 RX ORDER — MORPHINE SULFATE 0.5 MG/ML
INJECTION, SOLUTION EPIDURAL; INTRATHECAL; INTRAVENOUS AS NEEDED
Status: DISCONTINUED | OUTPATIENT
Start: 2025-04-21 | End: 2025-04-21

## 2025-04-21 RX ORDER — HYDROXYZINE HYDROCHLORIDE 25 MG/1
25 TABLET, FILM COATED ORAL EVERY 6 HOURS PRN
Status: DISCONTINUED | OUTPATIENT
Start: 2025-04-21 | End: 2025-04-25 | Stop reason: HOSPADM

## 2025-04-21 RX ORDER — CALCIUM CARBONATE 500 MG/1
1000 TABLET, CHEWABLE ORAL DAILY PRN
Status: DISCONTINUED | OUTPATIENT
Start: 2025-04-21 | End: 2025-04-25 | Stop reason: HOSPADM

## 2025-04-21 RX ORDER — OXYCODONE HYDROCHLORIDE 10 MG/1
10 TABLET ORAL EVERY 4 HOURS PRN
Status: DISCONTINUED | OUTPATIENT
Start: 2025-04-22 | End: 2025-04-22

## 2025-04-21 RX ORDER — OXYTOCIN/RINGER'S LACTATE 30/500 ML
62.5 PLASTIC BAG, INJECTION (ML) INTRAVENOUS ONCE
Status: COMPLETED | OUTPATIENT
Start: 2025-04-21 | End: 2025-04-21

## 2025-04-21 RX ORDER — CEPHALEXIN 500 MG/1
500 CAPSULE ORAL EVERY 6 HOURS SCHEDULED
Status: COMPLETED | OUTPATIENT
Start: 2025-04-21 | End: 2025-04-23

## 2025-04-21 RX ORDER — DOCUSATE SODIUM 100 MG/1
100 CAPSULE, LIQUID FILLED ORAL 2 TIMES DAILY
Status: DISCONTINUED | OUTPATIENT
Start: 2025-04-21 | End: 2025-04-25 | Stop reason: HOSPADM

## 2025-04-21 RX ORDER — ONDANSETRON 2 MG/ML
4 INJECTION INTRAMUSCULAR; INTRAVENOUS EVERY 8 HOURS PRN
Status: DISCONTINUED | OUTPATIENT
Start: 2025-04-21 | End: 2025-04-25 | Stop reason: HOSPADM

## 2025-04-21 RX ORDER — METRONIDAZOLE 500 MG/1
500 TABLET ORAL EVERY 12 HOURS SCHEDULED
Status: COMPLETED | OUTPATIENT
Start: 2025-04-21 | End: 2025-04-22

## 2025-04-21 RX ORDER — ONDANSETRON 2 MG/ML
4 INJECTION INTRAMUSCULAR; INTRAVENOUS EVERY 6 HOURS PRN
Status: DISCONTINUED | OUTPATIENT
Start: 2025-04-21 | End: 2025-04-21 | Stop reason: SDUPTHER

## 2025-04-21 RX ORDER — KETOROLAC TROMETHAMINE 30 MG/ML
30 INJECTION, SOLUTION INTRAMUSCULAR; INTRAVENOUS EVERY 6 HOURS SCHEDULED
Status: DISCONTINUED | OUTPATIENT
Start: 2025-04-21 | End: 2025-04-21

## 2025-04-21 RX ORDER — SODIUM CHLORIDE, SODIUM LACTATE, POTASSIUM CHLORIDE, CALCIUM CHLORIDE 600; 310; 30; 20 MG/100ML; MG/100ML; MG/100ML; MG/100ML
INJECTION, SOLUTION INTRAVENOUS CONTINUOUS PRN
Status: DISCONTINUED | OUTPATIENT
Start: 2025-04-21 | End: 2025-04-21

## 2025-04-21 RX ORDER — BENZOCAINE/MENTHOL 6 MG-10 MG
1 LOZENGE MUCOUS MEMBRANE DAILY PRN
Status: DISCONTINUED | OUTPATIENT
Start: 2025-04-21 | End: 2025-04-25 | Stop reason: HOSPADM

## 2025-04-21 RX ORDER — FENTANYL CITRATE 50 UG/ML
50 INJECTION, SOLUTION INTRAMUSCULAR; INTRAVENOUS
Status: DISCONTINUED | OUTPATIENT
Start: 2025-04-21 | End: 2025-04-22

## 2025-04-21 RX ORDER — IBUPROFEN 600 MG/1
600 TABLET, FILM COATED ORAL EVERY 6 HOURS
Status: DISCONTINUED | OUTPATIENT
Start: 2025-04-22 | End: 2025-04-21

## 2025-04-21 RX ORDER — ONDANSETRON 2 MG/ML
INJECTION INTRAMUSCULAR; INTRAVENOUS AS NEEDED
Status: DISCONTINUED | OUTPATIENT
Start: 2025-04-21 | End: 2025-04-21

## 2025-04-21 RX ORDER — EPHEDRINE SULFATE 50 MG/ML
INJECTION INTRAVENOUS AS NEEDED
Status: DISCONTINUED | OUTPATIENT
Start: 2025-04-21 | End: 2025-04-21

## 2025-04-21 RX ORDER — LIDOCAINE HYDROCHLORIDE AND EPINEPHRINE 20; 5 MG/ML; UG/ML
INJECTION, SOLUTION EPIDURAL; INFILTRATION; INTRACAUDAL; PERINEURAL AS NEEDED
Status: DISCONTINUED | OUTPATIENT
Start: 2025-04-21 | End: 2025-04-21

## 2025-04-21 RX ORDER — NIFEDIPINE 30 MG/1
30 TABLET, EXTENDED RELEASE ORAL DAILY
Status: DISCONTINUED | OUTPATIENT
Start: 2025-04-21 | End: 2025-04-21

## 2025-04-21 RX ORDER — ENOXAPARIN SODIUM 100 MG/ML
40 INJECTION SUBCUTANEOUS EVERY 12 HOURS SCHEDULED
Status: DISCONTINUED | OUTPATIENT
Start: 2025-04-21 | End: 2025-04-25 | Stop reason: HOSPADM

## 2025-04-21 RX ORDER — DEXAMETHASONE SODIUM PHOSPHATE 10 MG/ML
INJECTION, SOLUTION INTRAMUSCULAR; INTRAVENOUS AS NEEDED
Status: DISCONTINUED | OUTPATIENT
Start: 2025-04-21 | End: 2025-04-21

## 2025-04-21 RX ORDER — NALBUPHINE HYDROCHLORIDE 10 MG/ML
3 INJECTION INTRAMUSCULAR; INTRAVENOUS; SUBCUTANEOUS
Status: ACTIVE | OUTPATIENT
Start: 2025-04-21 | End: 2025-04-22

## 2025-04-21 RX ORDER — NIFEDIPINE 60 MG/1
60 TABLET, EXTENDED RELEASE ORAL 2 TIMES DAILY
Status: DISCONTINUED | OUTPATIENT
Start: 2025-04-21 | End: 2025-04-25 | Stop reason: HOSPADM

## 2025-04-21 RX ORDER — SIMETHICONE 80 MG
80 TABLET,CHEWABLE ORAL 4 TIMES DAILY PRN
Status: DISCONTINUED | OUTPATIENT
Start: 2025-04-21 | End: 2025-04-25 | Stop reason: HOSPADM

## 2025-04-21 RX ORDER — NALOXONE HYDROCHLORIDE 0.4 MG/ML
0.1 INJECTION, SOLUTION INTRAMUSCULAR; INTRAVENOUS; SUBCUTANEOUS
Status: DISCONTINUED | OUTPATIENT
Start: 2025-04-21 | End: 2025-04-22

## 2025-04-21 RX ORDER — METOCLOPRAMIDE HYDROCHLORIDE 5 MG/ML
10 INJECTION INTRAMUSCULAR; INTRAVENOUS EVERY 6 HOURS PRN
Status: DISCONTINUED | OUTPATIENT
Start: 2025-04-21 | End: 2025-04-25 | Stop reason: HOSPADM

## 2025-04-21 RX ORDER — OXYTOCIN/RINGER'S LACTATE 30/500 ML
PLASTIC BAG, INJECTION (ML) INTRAVENOUS CONTINUOUS PRN
Status: DISCONTINUED | OUTPATIENT
Start: 2025-04-21 | End: 2025-04-21

## 2025-04-21 RX ORDER — KETOROLAC TROMETHAMINE 30 MG/ML
INJECTION, SOLUTION INTRAMUSCULAR; INTRAVENOUS AS NEEDED
Status: DISCONTINUED | OUTPATIENT
Start: 2025-04-21 | End: 2025-04-21

## 2025-04-21 RX ORDER — OXYCODONE HYDROCHLORIDE 5 MG/1
5 TABLET ORAL EVERY 4 HOURS PRN
Status: DISCONTINUED | OUTPATIENT
Start: 2025-04-22 | End: 2025-04-22

## 2025-04-21 RX ADMIN — DOCUSATE SODIUM 100 MG: 100 CAPSULE, LIQUID FILLED ORAL at 17:24

## 2025-04-21 RX ADMIN — SODIUM CHLORIDE, SODIUM LACTATE, POTASSIUM CHLORIDE, AND CALCIUM CHLORIDE: .6; .31; .03; .02 INJECTION, SOLUTION INTRAVENOUS at 05:34

## 2025-04-21 RX ADMIN — MAGNESIUM SULFATE IN WATER 1 G/HR: 20 INJECTION, SOLUTION INTRAVENOUS at 12:13

## 2025-04-21 RX ADMIN — EPHEDRINE SULFATE 5 MG: 50 INJECTION INTRAVENOUS at 05:43

## 2025-04-21 RX ADMIN — DOCUSATE SODIUM 100 MG: 100 CAPSULE, LIQUID FILLED ORAL at 09:38

## 2025-04-21 RX ADMIN — EPHEDRINE SULFATE 10 MG: 50 INJECTION INTRAVENOUS at 06:08

## 2025-04-21 RX ADMIN — NIFEDIPINE 60 MG: 60 TABLET, EXTENDED RELEASE ORAL at 20:54

## 2025-04-21 RX ADMIN — AZITHROMYCIN MONOHYDRATE 500 MG: 500 INJECTION, POWDER, LYOPHILIZED, FOR SOLUTION INTRAVENOUS at 05:40

## 2025-04-21 RX ADMIN — LIDOCAINE HYDROCHLORIDE,EPINEPHRINE BITARTRATE 10 ML: 20; .005 INJECTION, SOLUTION EPIDURAL; INFILTRATION; INTRACAUDAL; PERINEURAL at 05:28

## 2025-04-21 RX ADMIN — MORPHINE SULFATE 3 MG: 0.5 INJECTION, SOLUTION EPIDURAL; INTRATHECAL; INTRAVENOUS at 06:06

## 2025-04-21 RX ADMIN — CEPHALEXIN 500 MG: 500 CAPSULE ORAL at 23:34

## 2025-04-21 RX ADMIN — ROPIVACAINE HYDROCHLORIDE: 2 INJECTION, SOLUTION EPIDURAL; INFILTRATION at 01:32

## 2025-04-21 RX ADMIN — DEXAMETHASONE SODIUM PHOSPHATE 10 MG: 10 INJECTION INTRAMUSCULAR; INTRAVENOUS at 06:01

## 2025-04-21 RX ADMIN — CEPHALEXIN 500 MG: 500 CAPSULE ORAL at 12:14

## 2025-04-21 RX ADMIN — PHENYLEPHRINE HYDROCHLORIDE 50 MCG/MIN: 10 INJECTION INTRAVENOUS at 05:36

## 2025-04-21 RX ADMIN — ONDANSETRON 4 MG: 2 INJECTION INTRAMUSCULAR; INTRAVENOUS at 05:10

## 2025-04-21 RX ADMIN — Medication 50 MILLI-UNITS/MIN: at 06:01

## 2025-04-21 RX ADMIN — CEFAZOLIN SODIUM 1000 MG: 2 SOLUTION INTRAVENOUS at 05:40

## 2025-04-21 RX ADMIN — LIDOCAINE HYDROCHLORIDE,EPINEPHRINE BITARTRATE 5 ML: 20; .005 INJECTION, SOLUTION EPIDURAL; INFILTRATION; INTRACAUDAL; PERINEURAL at 05:38

## 2025-04-21 RX ADMIN — NIFEDIPINE 60 MG: 30 TABLET, FILM COATED, EXTENDED RELEASE ORAL at 08:06

## 2025-04-21 RX ADMIN — METRONIDAZOLE 500 MG: 500 TABLET ORAL at 09:38

## 2025-04-21 RX ADMIN — KETOROLAC TROMETHAMINE 30 MG: 30 INJECTION, SOLUTION INTRAMUSCULAR; INTRAVENOUS at 12:14

## 2025-04-21 RX ADMIN — LABETALOL HYDROCHLORIDE 20 MG: 5 INJECTION, SOLUTION INTRAVENOUS at 02:42

## 2025-04-21 RX ADMIN — ACETAMINOPHEN 650 MG: 325 TABLET, FILM COATED ORAL at 09:38

## 2025-04-21 RX ADMIN — EPHEDRINE SULFATE 5 MG: 50 INJECTION INTRAVENOUS at 05:54

## 2025-04-21 RX ADMIN — OXYTOCIN 62.5 MILLI-UNITS/MIN: 10 INJECTION INTRAVENOUS at 07:15

## 2025-04-21 RX ADMIN — METRONIDAZOLE 500 MG: 500 TABLET ORAL at 20:54

## 2025-04-21 RX ADMIN — CALCIUM CARBONATE 1000 MG: 500 TABLET, CHEWABLE ORAL at 02:58

## 2025-04-21 RX ADMIN — ACETAMINOPHEN 650 MG: 325 TABLET, FILM COATED ORAL at 17:23

## 2025-04-21 RX ADMIN — ACETAMINOPHEN 650 MG: 325 TABLET, FILM COATED ORAL at 23:34

## 2025-04-21 RX ADMIN — DEXTROSE AND SODIUM CHLORIDE 50 ML/HR: 5; .9 INJECTION, SOLUTION INTRAVENOUS at 04:12

## 2025-04-21 RX ADMIN — ONDANSETRON 4 MG: 2 INJECTION INTRAMUSCULAR; INTRAVENOUS at 06:01

## 2025-04-21 RX ADMIN — FUROSEMIDE 20 MG: 20 TABLET ORAL at 17:24

## 2025-04-21 RX ADMIN — ENOXAPARIN SODIUM 40 MG: 40 INJECTION SUBCUTANEOUS at 20:54

## 2025-04-21 RX ADMIN — SODIUM CHLORIDE 50 ML/HR: 0.9 INJECTION, SOLUTION INTRAVENOUS at 04:02

## 2025-04-21 RX ADMIN — KETOROLAC TROMETHAMINE 30 MG: 30 INJECTION, SOLUTION INTRAMUSCULAR; INTRAVENOUS at 06:30

## 2025-04-21 RX ADMIN — CEPHALEXIN 500 MG: 500 CAPSULE ORAL at 17:24

## 2025-04-21 NOTE — OP NOTE
OPERATIVE REPORT  PATIENT NAME: Teri Mckinney    :  1985  MRN: [id number]  Pt Location: [facility] L[facility] OR ROOM 02    SURGERY DATE: 2025    Surgeons and Role:     * Genoveva Kathleen MD - Primary     * Arlet Davila MD - Assisting    Preop Diagnosis:  Failed induction of labor, unspecified type [O61.9]    Post-Op Diagnosis Codes:     * Failed induction of labor, unspecified type [O61.9]    Procedure(s) (LRB):   SECTION () (N/A)    Specimen(s):  ID Type Source Tests Collected by Time Destination   1 :  Tissue (Placenta on Hold) OB Only Placenta TISSUE EXAM OB (PLACENTA) ONLY Genoveva Kathleen MD 2025 0603    A :  Cord Blood Cord BLOOD GAS, VENOUS, CORD (Canceled), BLOOD GAS, ARTERIAL, CORD Genoveva Kathleen MD 2025 0603        Surgical QBL:  Surgical QBL (mL): 881 mL      Drains:  Urethral Catheter Non-latex;Double-lumen 16 Fr. (Active)   Reasons to continue Urinary Catheter  Post-operative urological requirements 25 1245   Goal for Removal Voiding trial when ambulation improves 25 2329   Site Assessment Clean;Skin intact 25 2329   Huff Care Done 25 2100   Collection Container Standard drainage bag 25 2329   Securement Method Securing device (Describe) 25 232   Output (mL) 225 mL 25 0300   Number of days: 1       Anesthesia Type:   Spinal    Operative Indications:  Failed induction of labor, unspecified type [O61.9]   Labor Course:   preeclampsia with severe features secondary to sustained severe range blood pressures immediately upon presentation to triage.  Patient was started on a magnesium infusion.  She required treatment with 20 and 40 of IV labetalol and was started on Procardia 30 daily.  On initial cervical exam she was closed thick and high.  She received a dose of oral Cytotec.  She then had a Huff balloon placed for cervical ripening. Following displacement of the Huff balloon she was noted to be 2 to 3 cm dilated  however her cervix was still noted to be thick she therefore received a dose of vaginal Cytotec.  On the evening of , she was noted to have a bump in her creatinine from 0.78-0.93, her magnesium was therefore turned down to a rate of 1.  On exam she was unchanged and received a third dose of vaginal Cytotec.  She was then noted to have a sustained severe range blood pressure on the morning of . She required acute treatment with labetalol 20 mg three times through out that day.  Her Procardia was increased to 60 daily.  She made changed to 3/30/-4 and was given a fourth dose of vaginal Cytotec.  She maintained to 3.5/30/-4 and was started on a Pitocin titration.  Patient received an epidural for analgesia.  Fetal station remained too high to safely AROM for several hours. AROM was able to be performed at 1200 on .  Patient was for/80/-3 at this time and she had an IUPC placed for improved titration of her Pitocin.  She reached Pitocin of 30 without any additional cervical change.  Patient then received a 2-hour Pitocin break.  She did require additioanl acute treatment with 20 of labetolol. Pitocin was then restarted and at 2-hour recheck patient had not made any additional cervical change.  Patient was counseled at this time about the recommendation for primary low-transverse  section to which she gave informed consent.      Grant Group Classification System:  No Multiple pregnancy, No Transverse or oblique lie, No Breech lie, Gestational age is < 37 weeks +  is GRANT GROUP 10      Complications:   None apparent    Procedure and Technique:  Operative Findings:  1. Viable female  at 0601 with APGARs of 7 and 8 at 1 and 5 minutes. Fetus weighted 4lb 15.4oz.  2. Normal intact placenta with centrally inserted 3VC expressed at 0603.   3. Normal uterus, bilateral tubes and ovaries.  4. Mid-transverse hysterotomy, would advise against TOLAC in the future  5. Blood gases:                                 Umbilical Cord Arterial Blood Gas:                Results from last 7 days   Lab Units 04/21/25  0603   PH COA  7.257   PCO2 COA  44.9   PO2 COA mm HG 14.9   HCO3 COA mmol/L 19.6   BASE EXC COA mmol/L -7.4*   O2 CONTENT CORD ART ml/dl 5.0   O2 HGB, ARTERIAL CORD % 27.5       The patient was taken to the operating room. Epidural anesthesia was adequately established and 2g ancef and 500mg azithromycin was given for preoperative prophylaxis.  The patient was then placed in the dorsal supine position with a left tilt of the hips. The patient was then prepped with chloroprep for both vaginal and abdominal prep and draped in the usual sterile fashion for a Pfannenstiel skin incision.      A time out was performed to confirm correct patient and correct procedure. An incision was made in the skin with a surgical scalpel and sharp dissection was carried out over subsequent layers of tissue including the fascia, followed by the Bovie electrocautery for hemostasis.  The fascia was incised at the midline and the fascial incision was extended bilaterally using blunt dissection.    The rectus muscles were then divided at midline and the peritoneum was identified, tented up at its upper margin taking care to avoid the bladder, and then entered.  The peritoneal incision was extended superiorly and inferiorly.  The bladder blade was inserted and a transverse incision was made in the lower uterine segment using a new surgical blade.  The uterine incision was extended cephalad and caudal using blunt dissection. There was difficulty noted extending the hysterotomy sufficiently for fetal extraction, therefore bandage scissors were used bilaterally to extend the hysterotomy.     The surgeon's hand was placed into the uterine cavity. The fetal head was identified and elevated through the uterine incision with the assistance of fundal pressure. With gentle traction, the shoulder was delivered, followed by the rest of the  fetal body. There was no nuchal cord noted. On delivery the cord was doubly clamped and cut after delayed cord clamping.  The infant was then passed off the table to the awaiting  staff.  Venous and arterial blood gas, cord blood, and portion of cord was obtained for analysis and routine blood testing.  The placenta delivery was then sent to storage. Placenta was noted to be intact with a centrally inserted three-vessel cord.  Oxytocin was administered by IV infusion to enhance uterine contraction.  The uterus was exteriorized and cleared of all clots and remaining products of conception.  Examination of the hysterotomy at this time revealed that incision appeared to be mid transverse in position.     The uterine incision was re approximated using a 0 monocryl in a running locked fashion.  A second vertical imbricating stitch with 0 monocryl was applied.  Two interrupted figure of eights of 0 vicryl were applied. The uterine incision was examined and noted to be hemostatic.  The posterior cul-de-sac was cleared of all clots and products of conception.  The uterus was replaced into the abdomen and the pericolic gutters were cleared of all clots.  The uterine incision was once again reexamined and noted to be hemostatic.  The fascia was re approximated using 0 vicryl in a running nonlocked fashion. The subcutaneous tissue was irrigated and cleared of all clots and debris.  Good hemostasis was noted with Bovie electrocautery. The subcutaneous  tissue was reapproximated with 2-0 plain gut in two layers.  The skin incision was closed using 3-0 with exofin.  Good hemostasis was noted.  Patient tolerated the procedure well.  All needle, sponge, and instrument counts were noted to be correct x 2 at the end of the procedure.  Patient was transferred to the recovery room in stable condition.  Dr. Kathleen was present for the procedure.        Patient Disposition:  PACU         SIGNATURE: Arlet Davila MD  DATE:  April 21, 2025  TIME: 7:35 AM

## 2025-04-21 NOTE — OB LABOR/OXYTOCIN SAFETY PROGRESS
Oxytocin Safety Progress Check Note - Teri Mckinney 39 y.o. female MRN: [id number]    Unit/Bed#: -01 Encounter: [id number]    Dose (bryant-units/min) Oxytocin: 0 bryant-units/min  Contraction Frequency (minutes): 1 ctx  Contraction Intensity: Mild/Moderate  Uterine Activity Characteristics: Irregular  Cervical Dilation: 4        Cervical Effacement: 80  Fetal Station: -3  Baseline Rate (FHR): 125 bpm  Fetal Heart Rate (FHT): 132 BPM  FHR Category: 1               Vital Signs:   Vitals:    25 0454   BP: 128/60   Pulse: 80   Resp:    Temp:    SpO2:        Notes/comments:   Patient unchanged on SVE as noted above.  Discussed with the patient that she has been unchanged for many hours despite her water being broken and being on a Pitocin titration.  We discussed that she has officially met criteria for a failed induction of labor.  Patient is understanding of this and the recommendation to proceed with low-transverse  section.  We discussed the risks of  including blood clot, bleeding, infection and the steps we take to mitigate these risks.  We also talked about the risks of injury to nearby structures such as bladder, bowel, nearby nerves/vessels.   Patient is understanding of these risks and wishes to proceed.  Plan of care discussed with Dr. Kathleen.   Anesthesia provider notified.   Arlet Davila MD 2025 5:11 AM

## 2025-04-21 NOTE — OB LABOR/OXYTOCIN SAFETY PROGRESS
Oxytocin Safety Progress Check Note - Teri Mckinney 39 y.o. female MRN: [id number]    Unit/Bed#: -01 Encounter: [id number]    Dose (bryant-units/min) Oxytocin: 0 bryant-units/min  Contraction Frequency (minutes): 3-4  Contraction Intensity: Mild/Moderate  Uterine Activity Characteristics: Regular  Cervical Dilation: 4        Cervical Effacement: 50  Fetal Station: -3  Baseline Rate (FHR):  (broken trace)  Fetal Heart Rate (FHT): 139 BPM  FHR Category: I               Vital Signs:   Vitals:    04/20/25 2359   BP: 148/65   Pulse: 88   Resp:    Temp:    SpO2:        Notes/comments:   Notified by nursing that pitocin had not been turned off, despite charting as otherwise. Plan to start pitocin break now. SVE as above. FHT Cat I. D/w Dr. Hever Torres MD 4/21/2025 12:30 AM

## 2025-04-21 NOTE — UTILIZATION REVIEW
Initial Clinical Review    Admission: Date/Time/Statement:   Admission Orders (From admission, onward)       Ordered        04/18/25 0300  Inpatient Admission  Once                          Orders Placed This Encounter   Procedures    Inpatient Admission     Standing Status:   Standing     Number of Occurrences:   1     Level of Care:   Med Surg [16]     Estimated length of stay:   More than 2 Midnights     Certification:   I certify that inpatient services are medically necessary for this patient for a duration of greater than two midnights. See H&P and MD Progress Notes for additional information about the patient's course of treatment.     ED Arrival Information       Expected   4/17/2025     Arrival   4/17/2025 23:19    Acuity   -              Means of arrival   Walk-In    Escorted by   Family Member    Service   OB/GYN    Admission type   Emergency              Arrival complaint   34 wks preg/hypertension             Chief Complaint   Patient presents with    Hypertension     Pt reporting elevated blood pressures since 2230.         Initial Presentation: 39 y.o. female presented to L&D as inpatient for IOL in the setting of in the setting of preeclampsia with severe features. Diet controlled GDM. Plan continuous external monitoring, IVF,cytotec rodriguez balloon, IV pitocin if needed Epidural and IV MGSO 4 if needed and supportive care   SVE: 0/0/    @ 0906  Rodriguez balloon inserted   Contraction Frequency (minutes): 0  Cervical Dilation: Fingertip  Cervical Effacement: 0  Fetal Station: Ballotable  Baseline Rate (FHR): 130 bpm  Fetal Heart Rate (FHT): 130 BPM  FHR Category: 1    @ 1751  IV Insulin gtt  IV MGSO 4 gtt   Rodriguez expelled cytotex placed   Contraction Frequency (minutes): 30-4  Contraction Intensity: Mild/Moderate  Uterine Activity Characteristics: Occasional  Cervical Dilation: 2-3  Cervical Effacement: 30  Fetal Station: Ballotable  Baseline Rate (FHR): 125 bpm  Fetal Heart Rate (FHT): 130 BPM  FHR  Category: 1    @ 2037  Contraction Frequency (minutes): irregular  Contraction Intensity: Mild/Moderate  Uterine Activity Characteristics: Occasional  Cervical Dilation: 2-3  Cervical Effacement: 30  Fetal Station: Ballotable  Baseline Rate (FHR): 125 bpm  Fetal Heart Rate (FHT): 130 BPM  FHR Category: 1    04-19-25  @ 0031  S/p cytotec  Contraction Frequency (minutes): 0  Contraction Intensity: Mild  Uterine Activity Characteristics: Occasional (ctx appear picking up inverted)  Cervical Dilation: 2-3  Cervical Effacement: 30  Fetal Station: Ballotable  Baseline Rate (FHR): 120 bpm  Fetal Heart Rate (FHT): 127 BPM  FHR Category: 2     @ 0312  Contraction Frequency (minutes): irregular  Contraction Intensity: Mild  Uterine Activity Characteristics: Irritability  Cervical Dilation: 3  Cervical Effacement: 30  Fetal Station: Ballotable  Baseline Rate (FHR): 120 bpm  Fetal Heart Rate (FHT): 129 BPM  FHR Category: 1    @ 1621  Contraction Frequency (minutes): difficult to trace, pt laying on her side  Contraction Intensity: Mild  Uterine Activity Characteristics: Irritability  Cervical Dilation: 3-4  Cervical Effacement: 30  Fetal Station: Ballotable  Baseline Rate (FHR): 125 bpm  Fetal Heart Rate (FHT): 129 BPM  FHR Category: I    @ 2239  Dose (bryant-units/min) Oxytocin: 8 bryant-units/min  Contraction Frequency (minutes): 4-5  Contraction Intensity: Mild/Moderate  Uterine Activity Characteristics: Irritability  Cervical Dilation: 4  Cervical Effacement: 30  Fetal Station: Ballotable  Baseline Rate (FHR): 125 bpm  Fetal Heart Rate (FHT): 125 BPM  FHR Category: I    04-20-25  @ 0218  Dose (bryant-units/min) Oxytocin: 10 bryant-units/min  Contraction Frequency (minutes): 4  Contraction Intensity: Mild/Moderate  Uterine Activity Characteristics: Irritability  Cervical Dilation: 4  Cervical Effacement: 50  Fetal Station: Ballotable  Baseline Rate (FHR): 120 bpm  Fetal Heart Rate (FHT): 122 BPM  FHR Category: I    @ 0956  S/p  epidural     @ 1140  AROM clear fluid  Dose (bryant-units/min) Oxytocin: 24 bryant-units/min  Contraction Frequency (minutes): 4-5  Contraction Intensity: Mild/Moderate  Uterine Activity Characteristics: Irregular  Cervical Dilation: 4  Cervical Effacement: 50  Fetal Station: Ballotable  Baseline Rate (FHR): 125 bpm  Fetal Heart Rate (FHT): 128 BPM  FHR Category: 1    @ 1604  Dose (bryant-units/min) Oxytocin: 28 bryant-units/min  Contraction Frequency (minutes): difficult to trace  Contraction Intensity: Mild/Moderate  Uterine Activity Characteristics: Irregular  Cervical Dilation: 4  Cervical Effacement: 50  Fetal Station: Ballotable  Baseline Rate (FHR): 130 bpm  Fetal Heart Rate (FHT): 132 BPM  FHR Category: 1      @ 2113  Dose (bryant-units/min) Oxytocin: 30 bryant-units/min  Contraction Frequency (minutes): 3  Contraction Intensity: Mild/Moderate  Uterine Activity Characteristics: Regular  Cervical Dilation: 4  Cervical Effacement: 50  Fetal Station: -3  Baseline Rate (FHR): 130 bpm  Fetal Heart Rate (FHT): 144 BPM  FHR Category: I    24  @ 0030  Dose (bryant-units/min) Oxytocin: 0 bryant-units/min  Contraction Frequency (minutes): 3-4  Contraction Intensity: Mild/Moderate  Uterine Activity Characteristics: Regular  Cervical Dilation: 4  Cervical Effacement: 50  Fetal Station: -3  Baseline Rate (FHR):  (broken trace)  Fetal Heart Rate (FHT): 139 BPM  FHR Category: I    @ 0511  Dose (bryant-units/min) Oxytocin: 0 bryant-units/min  Contraction Frequency (minutes): 1 ctx  Contraction Intensity: Mild/Moderate  Uterine Activity Characteristics: Irregular  Cervical Dilation: 4  Cervical Effacement: 80  Fetal Station: -3  Baseline Rate (FHR): 125 bpm  Fetal Heart Rate (FHT): 132 BPM  FHR Category: 1      25    @ 34 w 4 d  FAILED IOL  FEMALE @ 0601  APGRA  7  8  WT 2250 grams   Baby taken to NICU       OB Treatment-Medication Administration from 2025 7139 to 2025 1036         Date/Time Order Dose  Route Action     04/18/2025 0123 labetalol (NORMODYNE) injection 20 mg 20 mg Intravenous Given     04/18/2025 0216 magnesium sulfate 4 g/100 mL IVPB (premix) 4 g 4 g Intravenous New Bag     04/18/2025 0236 magnesium sulfate 2 g/50 mL IVPB (premix) 2 g 2 g Intravenous New Bag     04/18/2025 0253 magnesium sulfate 20 g/500 mL infusion (premix) 2 g/hr Intravenous New Bag     04/18/2025 1213 magnesium sulfate 20 g/500 mL infusion (premix) 2 g/hr Intravenous New Bag     04/18/2025 1905 magnesium sulfate 20 g/500 mL infusion (premix) 2 g/hr Intravenous Handoff     04/18/2025 2029 magnesium sulfate 20 g/500 mL infusion (premix) 1 g/hr Intravenous Rate/Dose Change     04/19/2025 0018 magnesium sulfate 20 g/500 mL infusion (premix) 1 g/hr Intravenous New Bag     04/19/2025 1944 magnesium sulfate 20 g/500 mL infusion (premix) 1 g/hr Intravenous New Bag     04/19/2025 2000 magnesium sulfate 20 g/500 mL infusion (premix) 1 g/hr Intravenous Rate/Dose Verify     04/19/2025 2200 magnesium sulfate 20 g/500 mL infusion (premix) 1 g/hr Intravenous Rate/Dose Verify     04/20/2025 0000 magnesium sulfate 20 g/500 mL infusion (premix) 1 g/hr Intravenous Rate/Dose Verify     04/20/2025 0200 magnesium sulfate 20 g/500 mL infusion (premix) 1 g/hr Intravenous Rate/Dose Verify     04/20/2025 0400 magnesium sulfate 20 g/500 mL infusion (premix) 1 g/hr Intravenous Rate/Dose Verify     04/20/2025 0600 magnesium sulfate 20 g/500 mL infusion (premix) 1 g/hr Intravenous Rate/Dose Verify     04/20/2025 1601 magnesium sulfate 20 g/500 mL infusion (premix) 1 g/hr Intravenous New Bag     04/21/2025 0000 magnesium sulfate 20 g/500 mL infusion (premix) 1 g/hr Intravenous Rate/Dose Verify     04/21/2025 0200 magnesium sulfate 20 g/500 mL infusion (premix) 1 g/hr Intravenous Rate/Dose Verify     04/21/2025 0400 magnesium sulfate 20 g/500 mL infusion (premix) 1 g/hr Intravenous Rate/Dose Verify     04/18/2025 0154 labetalol (NORMODYNE) injection 40 mg 40 mg  Intravenous Given     04/18/2025 0215 lactated ringers infusion 50 mL/hr Intravenous New Bag     04/18/2025 0450 penicillin G (PFIZERPEN-G) in 0.9% sodium chloride 250 mL IVPB 5 Million Units 5 Million Units Intravenous New Bag     04/18/2025 0905 penicillin G (PFIZERPEN-G) in 0.9% sodium chloride 100 mL IVPB 2.5 Million Units 2.5 Million Units Intravenous New Bag     04/18/2025 1322 penicillin G (PFIZERPEN-G) in 0.9% sodium chloride 100 mL IVPB 2.5 Million Units 2.5 Million Units Intravenous New Bag     04/18/2025 1712 penicillin G (PFIZERPEN-G) in 0.9% sodium chloride 100 mL IVPB 2.5 Million Units 2.5 Million Units Intravenous New Bag     04/18/2025 2103 penicillin G (PFIZERPEN-G) in 0.9% sodium chloride 100 mL IVPB 2.5 Million Units 2.5 Million Units Intravenous New Bag     04/19/2025 0111 penicillin G (PFIZERPEN-G) in 0.9% sodium chloride 100 mL IVPB 2.5 Million Units 2.5 Million Units Intravenous New Bag     04/19/2025 0452 penicillin G (PFIZERPEN-G) in 0.9% sodium chloride 100 mL IVPB 2.5 Million Units 2.5 Million Units Intravenous New Bag     04/19/2025 0953 penicillin G (PFIZERPEN-G) in 0.9% sodium chloride 100 mL IVPB 2.5 Million Units 2.5 Million Units Intravenous New Bag     04/19/2025 1408 penicillin G (PFIZERPEN-G) in 0.9% sodium chloride 100 mL IVPB 2.5 Million Units 2.5 Million Units Intravenous New Bag     04/19/2025 1806 penicillin G (PFIZERPEN-G) in 0.9% sodium chloride 100 mL IVPB 2.5 Million Units 2.5 Million Units Intravenous New Bag     04/18/2025 0449 miSOPROStol (Cytotec) split tablet 50 mcg 50 mcg Oral Given     04/18/2025 0450 sodium chloride 0.9 % infusion 50 mL/hr Intravenous New Bag     04/18/2025 1905 sodium chloride 0.9 % infusion 50 mL/hr Intravenous Handoff     04/20/2025 1131 ondansetron (ZOFRAN) injection 4 mg 4 mg Intravenous Given     04/21/2025 0510 ondansetron (ZOFRAN) injection 4 mg 4 mg Intravenous Given     04/18/2025 0449 betamethasone acetate-betamethasone sodium phosphate  (CELESTONE) injection 12 mg 12 mg Intramuscular Given     04/19/2025 0409 betamethasone acetate-betamethasone sodium phosphate (CELESTONE) injection 12 mg 12 mg Intramuscular Given     04/18/2025 0809 NIFEdipine (PROCARDIA XL) 24 hr tablet 30 mg 30 mg Oral Given     04/19/2025 0800 NIFEdipine (PROCARDIA XL) 24 hr tablet 30 mg 30 mg Oral Given     04/18/2025 1439 insulin regular (HumuLIN R,NovoLIN R) 1 Units/mL in sodium chloride 0.9 % 100 mL infusion 0.5 Units/hr Intravenous New Bag     04/18/2025 1905 insulin regular (HumuLIN R,NovoLIN R) 1 Units/mL in sodium chloride 0.9 % 100 mL infusion 0.5 Units/hr Intravenous Handoff     04/18/2025 2016 insulin regular (HumuLIN R,NovoLIN R) 1 Units/mL in sodium chloride 0.9 % 100 mL infusion 0.5 Units/hr Intravenous Restarted     04/18/2025 2108 insulin regular (HumuLIN R,NovoLIN R) 1 Units/mL in sodium chloride 0.9 % 100 mL infusion 0.5 Units/hr Intravenous Rate/Dose Change     04/18/2025 2204 insulin regular (HumuLIN R,NovoLIN R) 1 Units/mL in sodium chloride 0.9 % 100 mL infusion 0.5 Units/hr Intravenous Rate/Dose Change     04/18/2025 2305 insulin regular (HumuLIN R,NovoLIN R) 1 Units/mL in sodium chloride 0.9 % 100 mL infusion 0.5 Units/hr Intravenous Rate/Dose Change     04/19/2025 0008 insulin regular (HumuLIN R,NovoLIN R) 1 Units/mL in sodium chloride 0.9 % 100 mL infusion 0.5 Units/hr Intravenous Rate/Dose Change     04/19/2025 0109 insulin regular (HumuLIN R,NovoLIN R) 1 Units/mL in sodium chloride 0.9 % 100 mL infusion 0.5 Units/hr Intravenous Rate/Dose Change     04/19/2025 0208 insulin regular (HumuLIN R,NovoLIN R) 1 Units/mL in sodium chloride 0.9 % 100 mL infusion 0.5 Units/hr Intravenous Rate/Dose Change     04/19/2025 0301 insulin regular (HumuLIN R,NovoLIN R) 1 Units/mL in sodium chloride 0.9 % 100 mL infusion 0.5 Units/hr Intravenous Rate/Dose Change     04/19/2025 0359 insulin regular (HumuLIN R,NovoLIN R) 1 Units/mL in sodium chloride 0.9 % 100 mL infusion  0.5 Units/hr Intravenous Rate/Dose Change     04/19/2025 0500 insulin regular (HumuLIN R,NovoLIN R) 1 Units/mL in sodium chloride 0.9 % 100 mL infusion 0.5 Units/hr Intravenous Rate/Dose Change     04/19/2025 0609 insulin regular (HumuLIN R,NovoLIN R) 1 Units/mL in sodium chloride 0.9 % 100 mL infusion 0.5 Units/hr Intravenous Rate/Dose Change     04/19/2025 0849 insulin regular (HumuLIN R,NovoLIN R) 1 Units/mL in sodium chloride 0.9 % 100 mL infusion 1 Units/hr Intravenous Rate/Dose Change     04/19/2025 1412 insulin regular (HumuLIN R,NovoLIN R) 1 Units/mL in sodium chloride 0.9 % 100 mL infusion 0.5 Units/hr Intravenous Rate/Dose Change     04/19/2025 1458 insulin regular (HumuLIN R,NovoLIN R) 1 Units/mL in sodium chloride 0.9 % 100 mL infusion 1 Units/hr Intravenous Rate/Dose Change     04/19/2025 1609 insulin regular (HumuLIN R,NovoLIN R) 1 Units/mL in sodium chloride 0.9 % 100 mL infusion 0.5 Units/hr Intravenous Rate/Dose Change     04/19/2025 1706 insulin regular (HumuLIN R,NovoLIN R) 1 Units/mL in sodium chloride 0.9 % 100 mL infusion 1 Units/hr Intravenous Rate/Dose Change     04/19/2025 1804 insulin regular (HumuLIN R,NovoLIN R) 1 Units/mL in sodium chloride 0.9 % 100 mL infusion 0.5 Units/hr Intravenous Rate/Dose Change     04/20/2025 2029 insulin regular (HumuLIN R,NovoLIN R) 1 Units/mL in sodium chloride 0.9 % 100 mL infusion 0.5 Units/hr Intravenous New Bag     04/20/2025 2233 insulin regular (HumuLIN R,NovoLIN R) 1 Units/mL in sodium chloride 0.9 % 100 mL infusion 0.5 Units/hr Intravenous Restarted     04/18/2025 1425 dextrose 5 % and sodium chloride 0.9 % infusion 50 mL/hr Intravenous New Bag     04/18/2025 1905 dextrose 5 % and sodium chloride 0.9 % infusion 25 mL/hr Intravenous Handoff     04/18/2025 2018 dextrose 5 % and sodium chloride 0.9 % infusion 25 mL/hr Intravenous New Bag     04/18/2025 2042 dextrose 5 % and sodium chloride 0.9 % infusion 50 mL/hr Intravenous Rate/Dose Change      04/19/2025 1407 dextrose 5 % and sodium chloride 0.9 % infusion 50 mL/hr Intravenous New Bag     04/20/2025 0906 dextrose 5 % and sodium chloride 0.9 % infusion 50 mL/hr Intravenous New Bag     04/21/2025 0412 dextrose 5 % and sodium chloride 0.9 % infusion 50 mL/hr Intravenous New Bag     04/19/2025 0134 sodium chloride 0.9 % infusion 50 mL/hr Intravenous New Bag     04/19/2025 2311 sodium chloride 0.9 % infusion 50 mL/hr Intravenous New Bag     04/20/2025 0951 sodium chloride 0.9 % infusion 150 mL/hr Intravenous Rate/Dose Change     04/20/2025 1014 sodium chloride 0.9 % infusion 50 mL/hr Intravenous Rate/Dose Change     04/20/2025 1024 sodium chloride 0.9 % infusion 750 mL/hr Intravenous Rate/Dose Change     04/20/2025 1131 sodium chloride 0.9 % infusion 50 mL/hr Intravenous New Bag     04/21/2025 0402 sodium chloride 0.9 % infusion 50 mL/hr Intravenous New Bag     04/18/2025 2023 miSOPROStol (Cytotec) split tablet 25 mcg 25 mcg Vaginal Given     04/18/2025 2241 calcium carbonate (TUMS) chewable tablet 500 mg 500 mg Oral Given     04/19/2025 0225 miSOPROStol (Cytotec) split tablet 25 mcg 25 mcg Vaginal Given     04/19/2025 0251 labetalol (NORMODYNE) injection 20 mg 20 mg Intravenous Given     04/19/2025 0750 miSOPROStol (Cytotec) split tablet 25 mcg 25 mcg Vaginal Given     04/20/2025 0802 NIFEdipine (PROCARDIA XL) 24 hr tablet 60 mg 60 mg Oral Given     04/21/2025 0806 NIFEdipine (PROCARDIA XL) 24 hr tablet 60 mg 60 mg Oral Given     04/19/2025 1058 NIFEdipine (PROCARDIA XL) 24 hr tablet 30 mg 30 mg Oral Given     04/19/2025 1159 miSOPROStol (Cytotec) split tablet 25 mcg 25 mcg Vaginal Given     04/19/2025 1130 labetalol (NORMODYNE) injection 20 mg 20 mg Intravenous Given     04/19/2025 1800 oxytocin (PITOCIN) 30 Units in lactated ringers 500 mL infusion 2 bryant-units/min Intravenous New Bag     04/19/2025 1828 oxytocin (PITOCIN) 30 Units in lactated ringers 500 mL infusion 4 bryant-units/min Intravenous Rate/Dose  Change     04/19/2025 1944 oxytocin (PITOCIN) 30 Units in lactated ringers 500 mL infusion 6 bryant-units/min Intravenous Rate/Dose Change     04/19/2025 2043 oxytocin (PITOCIN) 30 Units in lactated ringers 500 mL infusion 8 bryant-units/min Intravenous Rate/Dose Change     04/19/2025 2314 oxytocin (PITOCIN) 30 Units in lactated ringers 500 mL infusion 10 bryant-units/min Intravenous Rate/Dose Change     04/20/2025 0339 oxytocin (PITOCIN) 30 Units in lactated ringers 500 mL infusion 12 bryant-units/min Intravenous Rate/Dose Change     04/20/2025 0408 oxytocin (PITOCIN) 30 Units in lactated ringers 500 mL infusion 14 bryant-units/min Intravenous Rate/Dose Change     04/20/2025 0802 oxytocin (PITOCIN) 30 Units in lactated ringers 500 mL infusion 16 bryant-units/min Intravenous Rate/Dose Change     04/20/2025 0856 oxytocin (PITOCIN) 30 Units in lactated ringers 500 mL infusion 18 bryant-units/min Intravenous Rate/Dose Change     04/20/2025 1014 oxytocin (PITOCIN) 30 Units in lactated ringers 500 mL infusion 20 bryant-units/min Intravenous Rate/Dose Change     04/20/2025 1035 oxytocin (PITOCIN) 30 Units in lactated ringers 500 mL infusion 22 bryant-units/min Intravenous Rate/Dose Change     04/20/2025 1138 oxytocin (PITOCIN) 30 Units in lactated ringers 500 mL infusion 24 bryant-units/min Intravenous Rate/Dose Change     04/20/2025 1300 oxytocin (PITOCIN) 30 Units in lactated ringers 500 mL infusion 26 bryant-units/min Intravenous Rate/Dose Change     04/20/2025 1400 oxytocin (PITOCIN) 30 Units in lactated ringers 500 mL infusion 28 bryant-units/min Intravenous Rate/Dose Change     04/20/2025 1630 oxytocin (PITOCIN) 30 Units in lactated ringers 500 mL infusion 30 bryant-units/min Intravenous Rate/Dose Change     04/20/2025 2011 oxytocin (PITOCIN) 30 Units in lactated ringers 500 mL infusion 30 bryant-units/min Intravenous New Bag     04/21/2025 0252 oxytocin (PITOCIN) 30 Units in lactated ringers 500 mL infusion 2 bryant-units/min  Intravenous Restarted     04/21/2025 0322 oxytocin (PITOCIN) 30 Units in lactated ringers 500 mL infusion 4 bryant-units/min Intravenous Rate/Dose Change     04/21/2025 0355 oxytocin (PITOCIN) 30 Units in lactated ringers 500 mL infusion 6 bryant-units/min Intravenous Rate/Dose Change     04/21/2025 0415 oxytocin (PITOCIN) 30 Units in lactated ringers 500 mL infusion 8 bryant-units/min Intravenous Rate/Dose Change     04/21/2025 0452 oxytocin (PITOCIN) 30 Units in lactated ringers 500 mL infusion 10 bryant-units/min Intravenous Rate/Dose Change     04/20/2025 0027 calcium carbonate (TUMS) chewable tablet 1,000 mg 1,000 mg Oral Given     04/20/2025 1131 calcium carbonate (TUMS) chewable tablet 1,000 mg 1,000 mg Oral Given     04/21/2025 0258 calcium carbonate (TUMS) chewable tablet 1,000 mg 1,000 mg Oral Given     04/20/2025 1004 ropivacaine 0.2% PCEA -- Epidural New Bag     04/20/2025 1750 ropivacaine 0.2% PCEA -- Epidural New Bag     04/21/2025 0132 ropivacaine 0.2% PCEA -- Epidural New Bag     04/20/2025 1431 Famotidine (PF) (PEPCID) injection 20 mg 20 mg Intravenous Given     04/21/2025 0242 labetalol (NORMODYNE) injection 20 mg 20 mg Intravenous Given     04/20/2025 2323 NIFEdipine (PROCARDIA XL) 24 hr tablet 30 mg 30 mg Oral Given     04/21/2025 0540 ceFAZolin (ANCEF) IVPB (premix in dextrose) 2,000 mg 50 mL 1,000 mg Intravenous Given     04/21/2025 0655 ceFAZolin (ANCEF) IVPB (premix in dextrose) 2,000 mg 50 mL -- Intravenous Anesthesia Volume Adjustment     04/21/2025 0540 azithromycin (ZITHROMAX) 500 mg in sodium chloride 0.9% 250mL IVPB 500 mg 500 mg Intravenous Given     04/21/2025 0655 azithromycin (ZITHROMAX) 500 mg in sodium chloride 0.9% 250mL IVPB 500 mg -- Intravenous Anesthesia Volume Adjustment     04/21/2025 0938 acetaminophen (TYLENOL) tablet 650 mg 650 mg Oral Given     04/21/2025 0938 docusate sodium (COLACE) capsule 100 mg 100 mg Oral Given     04/21/2025 0715 oxytocin (PITOCIN) 30 Units in  lactated ringers 500 mL infusion 62.5 bryant-units/min Intravenous New Bag     04/21/2025 0938 metroNIDAZOLE (FLAGYL) tablet 500 mg 500 mg Oral Given            Scheduled Medications:  acetaminophen, 650 mg, Oral, Q6H KYAW  cephalexin, 500 mg, Oral, Q6H KYAW  docusate sodium, 100 mg, Oral, BID  enoxaparin, 40 mg, Subcutaneous, Q12H KYAW  ketorolac, 30 mg, Intravenous, Q6H KYAW   Followed by  [START ON 4/22/2025] ibuprofen, 600 mg, Oral, Q6H  metroNIDAZOLE, 500 mg, Oral, Q12H KYAW  NIFEdipine, 30 mg, Oral, Daily  NIFEdipine, 60 mg, Oral, Daily      Continuous IV Infusions:  magnesium sulfate, 1 g/hr, Intravenous, Continuous      PRN Meds:  benzocaine-menthol-lanolin-aloe, 1 Application, Topical, Q6H PRN  calcium carbonate, 1,000 mg, Oral, Daily PRN  calcium gluconate, 1 g, Intravenous, Once PRN  diphenhydrAMINE, 25 mg, Intravenous, Q6H PRN  fentaNYL, 50 mcg, Intravenous, Q5 Min PRN  hydrocortisone, 1 Application, Topical, Daily PRN  HYDROmorphone, 0.5 mg, Intravenous, Q2H PRN  hydrOXYzine HCL, 25 mg, Oral, Q6H PRN  metoclopramide, 10 mg, Intravenous, Q6H PRN  nalbuphine, 3 mg, Intravenous, Q3H PRN  naloxone, 0.1 mg, Intravenous, Q3 min PRN  ondansetron, 4 mg, Intravenous, Q8H PRN  [START ON 4/22/2025] oxyCODONE, 10 mg, Oral, Q4H PRN  [START ON 4/22/2025] oxyCODONE, 5 mg, Oral, Q4H PRN  simethicone, 80 mg, Oral, 4x Daily PRN  witch hazel-glycerin, 1 Pad, Topical, Q4H PRN      ED Triage Vitals   Temperature Pulse Respirations Blood Pressure SpO2 Pain Score   04/18/25 0717 04/17/25 2328 04/17/25 2328 04/17/25 2328 04/17/25 2328 04/18/25 0826   97.8 °F (36.6 °C) 90 18 (!) 191/101 99 % 2     Weight (last 2 days)       Date/Time    04/21/25 0017    Comment rows:    OBSERV: RN to bedside to restart Pitocin per Dr. Torres. Pitocin still at 30. Prior RN did not shut off Pitocin. Dr. Torres notified and Dr. Kathleen notified at 04/21/25 0017 04/20/25 2200    Comment rows:    OBSERV: Pt requested and given red sugar free jello at this  time at 04/20/25 2200 04/20/25 1959    Comment rows:    OBSERV: DTR right bicep +2 Average at 04/20/25 1959 04/20/25 1800    Comment rows:    OBSERV: DTR biceps right arm +2 average at 04/20/25 1800    04/19/25 2134    Comment rows:    OBSERV: Brought Labetalol to bedside for 2 severe range BP, prior to pushing repeat BP was 142/77. Sent message to Dr. Davis at this time and held off on Labetalol push. at 04/19/25 2134            Vital Signs (last 3 days)       Date/Time Temp Pulse Resp BP MAP (mmHg) SpO2 O2 Device Cardiac (WDL) Patient Position - Orthostatic VS Pain    04/21/25 1003 98 °F (36.7 °C) 88 18 153/83 -- 94 % None (Room air) -- Sitting No Pain    04/21/25 0938 -- -- -- -- -- -- -- -- -- 2    04/21/25 0900 98.1 °F (36.7 °C) 75 18 147/76 105 95 % None (Room air) -- Sitting 2    04/21/25 0830 -- 77 18 146/85 -- 95 % -- WDL Lying 2    04/21/25 0815 -- 79 -- 166/87 119 96 % -- -- -- 2    04/21/25 0800 -- 75 -- 144/85 109 95 % -- -- -- 2    04/21/25 0745 -- 81 -- 141/83 107 96 % -- -- -- 2    04/21/25 0730 -- 72 -- 128/80 100 95 % -- -- -- No Pain    04/21/25 0715 -- 73 -- 125/73 93 95 % -- -- -- No Pain    04/21/25 0705 -- 76 -- -- 91 95 % -- -- -- --    04/21/25 0704 -- 79 -- -- -- 96 % -- -- -- --    04/21/25 0703 -- 80 -- -- -- 97 % -- -- -- --    04/21/25 0702 -- 79 -- -- -- 97 % -- -- -- --    04/21/25 0701 -- 80 -- -- -- 96 % -- -- -- --    04/21/25 0700 98.2 °F (36.8 °C) 82 18 118/73 -- 96 % -- WDL Lying No Pain    04/21/25 0659 -- 76 -- -- -- 96 % -- -- -- --    04/21/25 0658 -- 77 -- -- -- 96 % -- -- -- --    04/21/25 0657 -- 78 -- -- -- 95 % -- -- -- --    04/21/25 0656 -- 77 -- -- -- 95 % -- -- -- --    04/21/25 0655 98.2 °F (36.8 °C) 78 16 118/68 -- 97 % None (Room air) -- -- --    04/21/25 0654 -- 80 -- 118/68 -- 94 % -- -- -- --    04/21/25 0515 -- -- -- 120/58 -- -- -- -- -- --    04/21/25 0454 -- 80 -- 128/60 -- -- -- -- -- --    04/21/25 0445 -- 80 -- 140/70 -- -- -- -- -- --     04/21/25 0434 -- 71 -- 135/68 -- -- -- -- -- --    04/21/25 0424 -- 75 -- 129/67 -- -- -- -- -- --    04/21/25 0414 98.4 °F (36.9 °C) 76 18 131/64 -- -- -- -- Lying No Pain    04/21/25 0404 -- 72 -- 125/61 -- -- -- -- -- --    04/21/25 0355 -- 72 -- 118/58 -- -- -- -- -- --    04/21/25 0344 -- 71 -- 117/61 -- -- -- -- -- --    04/21/25 0334 -- 77 -- 123/63 -- -- -- -- -- --    04/21/25 0324 -- 81 -- 111/55 -- -- -- -- -- --    04/21/25 0314 -- 80 -- 110/57 -- -- -- -- -- --    04/21/25 0309 -- 77 -- 116/61 -- -- -- -- -- --    04/21/25 0301 -- 85 -- 119/64 -- -- -- -- -- --    04/21/25 0256 -- 70 -- 111/59 -- -- -- -- -- --    04/21/25 0254 98.1 °F (36.7 °C) -- 18 151/71 -- -- -- -- Lying --    04/21/25 0252 -- -- -- 151/71 -- -- -- -- -- --    04/21/25 0244 -- 91 -- 151/71 -- -- -- -- -- --    04/21/25 0230 -- 85 -- 162/63 -- -- -- -- -- --    04/21/25 0214 -- 82 -- 150/69 -- -- -- -- -- --    04/21/25 0159 -- 82 -- 168/70 -- -- -- -- -- --    04/21/25 0144 -- 85 -- 131/59 -- -- -- -- -- --    04/21/25 0129 -- 73 -- 139/66 -- -- -- -- -- --    04/21/25 0114 -- 85 -- 137/66 -- -- -- -- -- --    04/21/25 0059 -- 77 -- 137/63 -- -- -- -- -- --    04/21/25 0044 -- 88 -- 149/74 -- -- -- -- -- --    04/21/25 0030 -- 88 -- 139/66 -- -- -- -- -- --    04/21/25 0017 -- -- -- -- -- -- -- -- -- --    OBSERV: RN to bedside to restart Pitocin per Dr. Torres. Pitocin still at 30. Prior RN did not shut off Pitocin. Dr. Torres notified and Dr. Kathleen notified at 04/21/25 0017 04/20/25 2359 -- 88 -- 148/65 -- -- -- -- -- --    04/20/25 2346 -- 88 -- 146/65 -- -- -- -- -- --    04/20/25 2329 -- 86 -- 139/62 -- -- -- -- -- --    04/20/25 2314 -- 85 -- 130/57 -- -- -- -- -- --    04/20/25 2307 97.9 °F (36.6 °C) -- 18 -- -- -- -- -- -- No Pain    04/20/25 2300 -- 93 -- 142/65 -- -- -- -- -- --    04/20/25 2244 -- 86 -- 169/82 -- -- -- -- -- --    04/20/25 2231 -- 87 -- 164/79 -- -- -- -- -- No Pain    04/20/25 2216 -- 87 -- 157/79 --  -- -- -- -- No Pain    04/20/25 2200 97.5 °F (36.4 °C) 85 18 147/69 -- 96 % None (Room air) -- Sitting No Pain    OBSERV: Pt requested and given red sugar free jello at this time at 04/20/25 2200 04/20/25 2145 -- 92 -- 150/62 -- -- -- -- -- No Pain    04/20/25 2130 -- 91 -- 154/72 -- -- -- -- -- No Pain    04/20/25 2115 -- 99 -- 162/79 -- -- -- -- -- No Pain    04/20/25 2102 98.3 °F (36.8 °C) -- -- -- -- -- -- -- -- No Pain    04/20/25 2045 -- 96 -- 158/77 -- -- -- -- -- No Pain    04/20/25 2030 -- 96 -- 168/77 -- -- -- -- -- No Pain    04/20/25 2015 -- 85 -- 149/69 -- -- -- -- -- No Pain    04/20/25 2000 -- 94 -- 173/82 -- 96 % None (Room air) -- -- --    04/20/25 1959 98.3 °F (36.8 °C) -- -- -- -- -- -- -- -- No Pain    OBSERV: DTR right bicep +2 Average at 04/20/25 1959 04/20/25 1944 -- 90 -- 152/81 -- -- -- -- -- --    04/20/25 1932 -- 97 -- 147/79 -- -- -- -- -- No Pain    04/20/25 1915 -- 89 -- 143/69 -- -- -- -- -- No Pain    04/20/25 1900 98.2 °F (36.8 °C) 93 18 154/68 -- -- -- -- Sitting 1    04/20/25 1856 -- 92 20 171/80 -- -- -- -- Sitting --    04/20/25 1853 -- 101 -- -- -- 95 % -- -- -- --    04/20/25 1848 -- 104 -- -- -- 95 % -- -- -- --    04/20/25 1838 -- 99 -- -- -- 94 % -- -- -- --    04/20/25 1832 -- 100 -- 152/70 -- 94 % -- -- -- --    04/20/25 1823 -- 98 -- -- -- 94 % -- -- -- --    04/20/25 1818 -- 103 -- -- -- 93 % -- -- -- --    04/20/25 1815 -- -- -- 162/80 -- 96 % -- -- -- No Pain    04/20/25 1803 -- 105 -- -- -- 93 % -- -- -- --    04/20/25 1800 98.5 °F (36.9 °C) 96 18 148/86 -- 93 % None (Room air) -- Lying No Pain    OBSERV: DTR biceps right arm +2 average at 04/20/25 1800    04/20/25 1748 -- 104 -- -- -- 96 % -- -- -- --    04/20/25 1745 -- 105 -- 166/79 -- 94 % None (Room air) -- -- No Pain    04/20/25 1742 -- -- -- -- -- 96 % -- -- -- --    04/20/25 1738 -- 107 -- -- -- 95 % -- -- -- --    04/20/25 1731 -- 96 -- 159/77 -- 93 % -- -- -- No Pain    04/20/25 1730 -- 99 -- -- -- --  -- -- -- --    04/20/25 1719 -- -- -- -- -- -- -- -- -- No Pain    04/20/25 1715 -- 90 -- 153/71 -- -- -- -- -- No Pain    04/20/25 1700 -- 93 -- 145/75 -- -- -- -- -- No Pain    04/20/25 1656 97.8 °F (36.6 °C) -- -- -- -- -- -- -- -- --    04/20/25 1645 -- 97 -- 149/71 -- -- -- -- -- No Pain    04/20/25 1635 -- 100 -- 139/68 -- -- -- -- -- --    04/20/25 1630 -- -- -- -- -- -- -- -- -- No Pain    04/20/25 1615 -- -- -- -- -- -- -- -- -- No Pain    04/20/25 1604 98.4 °F (36.9 °C) 88 18 126/60 -- 96 % None (Room air) -- Lying --    04/20/25 1600 -- -- -- -- -- -- -- -- -- No Pain    04/20/25 1545 -- -- -- -- -- -- -- -- -- No Pain    04/20/25 1530 -- -- -- -- -- -- -- -- -- No Pain    04/20/25 1515 -- -- -- -- -- -- -- -- -- No Pain    04/20/25 1500 97.7 °F (36.5 °C) -- -- -- -- -- -- -- -- --    04/20/25 1420 -- 97 -- 159/84 -- -- -- -- -- --    04/20/25 1405 96.8 °F (36 °C) 98 18 159/84 -- 99 % None (Room air) -- Lying --    04/20/25 1350 -- 100 -- 155/82 -- -- -- -- -- --    04/20/25 1336 -- 93 -- 147/79 -- -- -- -- -- --    04/20/25 1320 -- 90 -- 148/77 -- -- -- -- -- --    04/20/25 1305 97.9 °F (36.6 °C) 93 -- 142/78 -- -- -- -- -- --    04/20/25 1251 -- 88 -- 137/79 -- -- -- -- -- --    04/20/25 1235 -- 94 -- 112/51 -- -- -- -- -- --    04/20/25 1221 -- 81 -- 110/56 -- -- -- -- -- --    04/20/25 1205 -- 78 -- 115/56 -- -- -- -- -- --    04/20/25 1150 97.9 °F (36.6 °C) 86 18 121/61 -- 98 % None (Room air) -- Sitting --    04/20/25 1102 -- 94 -- 142/76 -- -- -- -- -- --    04/20/25 1059 -- 90 -- 144/69 -- -- -- -- -- --    04/20/25 1056 -- 88 -- 140/68 -- -- -- -- -- --    04/20/25 1053 -- 76 -- 133/68 -- -- -- -- -- --    04/20/25 1050 -- 81 -- 131/67 -- -- -- -- -- --    04/20/25 1047 -- 83 -- 144/66 -- -- -- -- -- --    04/20/25 1044 -- 86 -- 128/69 -- -- -- -- -- --    04/20/25 1041 -- 79 -- 122/62 -- -- -- -- -- --    04/20/25 1038 -- 81 -- 125/61 -- -- -- -- -- --    04/20/25 1036 -- 85 -- 126/69 -- -- --  -- -- --    04/20/25 1032 -- 85 -- 108/55 -- -- -- -- -- --    04/20/25 1029 -- 89 -- 120/59 -- -- -- -- -- --    04/20/25 1028 -- 81 -- 115/56 -- -- -- -- -- --    04/20/25 1025 -- 86 -- 117/61 -- -- -- -- -- --    04/20/25 1024 -- 77 -- 93/52 -- -- -- -- -- --    04/20/25 1020 -- 83 -- 110/58 -- -- -- -- -- --    04/20/25 1017 -- 84 -- 105/53 -- -- -- -- -- --    04/20/25 1014 -- 92 -- 109/53 -- -- -- -- -- --    04/20/25 1011 -- 87 -- 105/53 -- -- -- -- -- --    04/20/25 1008 -- 87 -- 119/57 -- -- -- -- -- --    04/20/25 1005 -- 92 -- 131/63 -- -- -- -- -- --    04/20/25 1003 -- 96 -- 142/61 -- -- -- -- -- --    04/20/25 1000 97.9 °F (36.6 °C) 87 18 159/79 -- 98 % None (Room air) -- Sitting --    04/20/25 0958 -- 88 -- -- -- -- -- -- -- --    04/20/25 0956 -- 86 -- -- -- 97 % -- -- -- --    04/20/25 0954 -- 101 -- -- -- -- -- -- -- --    04/20/25 0951 -- 89 -- -- -- 97 % -- -- -- --    04/20/25 0950 -- 87 -- -- -- -- -- -- -- --    04/20/25 0946 -- 98 -- -- -- 98 % -- -- -- --    04/20/25 0922 -- 80 -- 138/77 -- -- -- -- -- --    04/20/25 0906 -- 81 -- 136/73 -- -- -- -- -- --    04/20/25 0836 -- 75 -- 142/75 -- -- -- -- -- --    04/20/25 0800 98 °F (36.7 °C) -- 18 -- -- 99 % None (Room air) -- -- 2    04/20/25 0759 -- 85 -- 182/86 -- -- -- -- Sitting --    04/20/25 0743 -- 71 -- 151/80 -- -- -- -- -- --    04/20/25 0729 -- 77 -- 140/76 -- -- -- -- -- --    04/20/25 0715 -- 83 -- 170/83 -- -- -- -- -- --    04/20/25 0658 -- 77 -- 137/73 -- -- -- -- -- --    04/20/25 0644 -- 77 -- 134/73 -- -- -- -- -- --    04/20/25 0627 98 °F (36.7 °C) -- 18 -- -- -- -- -- -- --    04/20/25 0614 -- 89 -- 138/68 -- -- -- -- -- --    04/20/25 0558 -- 80 -- 123/59 -- -- -- -- -- --    04/20/25 0543 -- 77 -- 120/59 -- -- -- -- -- --    04/20/25 0528 -- 82 -- 131/63 -- -- -- -- -- --    04/20/25 0513 -- 85 -- 140/63 -- -- -- -- -- --    04/20/25 0458 -- 83 -- 136/61 -- -- -- -- -- --    04/20/25 0443 -- 82 -- 129/59 -- -- -- -- -- --     04/20/25 0428 -- 85 -- 129/61 -- -- -- -- -- --    04/20/25 0413 -- 83 -- 134/63 -- -- -- -- -- --    04/20/25 0359 -- 78 -- 131/60 -- -- -- -- -- --    04/20/25 0343 -- 79 -- 151/74 -- -- -- -- -- --    04/20/25 0328 -- 80 -- 147/74 -- -- -- -- -- --    04/20/25 0314 -- 78 -- 125/74 -- -- -- -- -- --    04/20/25 0300 -- 84 -- 158/75 -- -- -- -- -- --    04/20/25 0158 -- 85 -- 148/73 -- -- -- -- -- --    04/20/25 0144 -- 85 -- 157/78 -- -- -- -- -- --    04/20/25 0128 -- 86 -- 122/57 -- -- -- -- -- --    04/20/25 0113 -- 93 -- 118/55 -- -- -- -- -- --    04/20/25 0058 -- 82 -- 127/57 -- -- -- -- -- --    04/20/25 0044 -- 82 -- 122/60 -- -- -- -- -- --    04/20/25 0030 98.3 °F (36.8 °C) -- 18 -- -- -- -- -- -- 3    04/20/25 0013 -- 86 -- 125/58 -- -- -- -- -- --    04/19/25 2358 -- 89 -- 112/56 -- -- -- -- -- --    04/19/25 2343 -- 93 -- 126/57 -- -- -- -- -- --    04/19/25 2330 -- 88 -- 131/60 -- -- -- -- -- --    04/19/25 2314 -- 84 -- 129/59 -- -- -- -- -- --    04/19/25 2258 -- 96 -- 152/88 -- -- -- -- -- --    04/19/25 2244 -- 85 -- 145/85 -- -- -- -- -- --    04/19/25 2213 -- 90 -- 131/58 -- -- -- -- -- --    04/19/25 2158 -- 96 -- 126/57 -- -- -- -- -- --    04/19/25 2143 -- 90 -- 112/55 -- -- -- -- -- --    04/19/25 2134 -- -- -- -- -- -- -- -- -- --    OBSERV: Brought Labetalol to bedside for 2 severe range BP, prior to pushing repeat BP was 142/77. Sent message to Dr. Davis at this time and held off on Labetalol push. at 04/19/25 2134 04/19/25 2129 -- 93 -- 142/77 -- -- -- -- -- --    04/19/25 2111 -- 92 -- 160/79 -- -- -- -- -- --    04/19/25 2100 -- 91 -- 161/83 -- -- -- -- -- --    04/19/25 2043 -- 89 -- 145/82 -- -- -- -- -- --    04/19/25 2040 98 °F (36.7 °C) -- 18 -- -- -- -- -- -- No Pain    04/19/25 2014 -- 96 -- 133/61 -- -- -- -- -- --    04/19/25 2004 -- -- -- -- -- -- -- -- -- No Pain    04/19/25 1958 -- 88 -- 137/63 -- -- -- -- -- --    04/19/25 1943 -- 93 -- 128/64 -- -- -- -- -- --     04/19/25 1928 -- 90 -- 132/59 -- -- -- -- -- --    04/19/25 1913 -- 87 -- 132/59 -- -- -- -- -- --    04/19/25 1900 -- 90 -- 143/64 -- -- -- -- -- --    04/19/25 1845 -- 86 -- 117/58 -- -- -- -- -- --    04/19/25 1830 -- 88 -- 167/77 -- -- -- -- -- --    04/19/25 1800 97.7 °F (36.5 °C) 87 18 137/70 -- -- -- -- -- No Pain    04/19/25 1700 -- 93 -- 143/82 -- -- -- -- -- --    04/19/25 1605 -- 90 -- 130/65 -- -- None (Room air) -- -- No Pain    04/19/25 1500 -- 93 -- 129/62 -- -- -- -- -- --    04/19/25 1435 -- 81 -- -- -- 97 % -- -- -- --    04/19/25 1430 -- 78 -- 138/67 -- -- -- -- -- --    04/19/25 1400 97.9 °F (36.6 °C) 80 18 136/66 -- 97 % -- -- Lying No Pain    04/19/25 1345 -- 76 -- 134/69 -- -- -- -- -- --    04/19/25 1330 -- 81 -- 134/75 -- -- -- -- -- --    04/19/25 1315 -- 83 -- 146/70 -- -- -- -- -- --    04/19/25 1300 -- 81 -- 126/58 -- -- -- -- -- --    04/19/25 1245 -- 83 -- 128/60 -- -- -- -- -- --    04/19/25 1230 -- 78 -- 121/56 -- -- -- -- -- --    04/19/25 1220 -- 81 -- 121/59 -- -- -- -- -- --    04/19/25 1210 -- 82 -- 123/57 -- -- -- -- -- --    04/19/25 1200 97.8 °F (36.6 °C) 85 18 141/89 -- 97 % None (Room air) -- Lying No Pain    04/19/25 1150 -- 73 -- 118/59 -- -- -- -- -- --    04/19/25 1140 -- 75 -- 118/59 -- -- -- -- -- --    04/19/25 1115 -- 83 -- 160/78 -- -- -- -- -- --    04/19/25 1100 -- 85 -- 176/92 -- -- -- -- -- --    04/19/25 1015 -- 81 -- 153/84 -- -- -- -- -- --    04/19/25 1000 -- 85 -- 168/81 -- -- -- -- -- --    04/19/25 0900 -- 80 -- 146/74 -- -- -- -- -- --    04/19/25 0845 -- 85 -- 141/84 -- -- -- -- -- --    04/19/25 0830 -- 79 -- 156/82 -- -- -- -- -- --    04/19/25 0815 -- 80 -- 147/78 -- -- -- -- -- --    04/19/25 0800 97.7 °F (36.5 °C) 80 18 143/80 -- 97 % None (Room air) -- Lying No Pain    04/19/25 0730 -- 81 -- 157/89 -- -- -- -- -- --    04/19/25 0715 -- 82 -- 150/84 -- -- -- -- -- --    04/19/25 0700 -- 82 -- 150/86 -- -- -- -- -- --    04/19/25 0646 -- 83 --  147/82 -- -- -- -- -- --    04/19/25 0631 -- 85 -- 158/88 -- -- -- -- -- --    04/19/25 0606 97.8 °F (36.6 °C) 89 18 -- -- 97 % -- -- -- No Pain    04/19/25 0602 -- 93 -- 123/64 -- -- -- -- -- --    04/19/25 0546 -- 86 -- 126/60 -- -- -- -- -- --    04/19/25 0532 -- 82 -- 131/58 -- -- -- -- -- --    04/19/25 0516 -- 82 -- 129/59 -- -- -- -- -- --    04/19/25 0446 -- 86 -- 135/73 -- -- -- -- -- --    04/19/25 0436 -- 79 -- 132/76 -- -- -- -- -- --    04/19/25 0426 -- 79 -- 142/82 -- -- -- -- -- --    04/19/25 0359 98.3 °F (36.8 °C) 81 18 -- -- 96 % None (Room air) -- -- --    04/19/25 0356 -- 85 -- 125/56 -- -- -- -- -- --    04/19/25 0346 -- 84 -- 109/59 -- -- -- -- -- --    04/19/25 0336 -- 82 -- 117/58 -- -- -- -- -- --    04/19/25 0330 -- -- -- -- -- -- -- -- -- 3    04/19/25 0326 -- 84 -- 146/78 -- -- -- -- -- --    04/19/25 0316 -- 83 -- 139/81 -- -- -- -- -- --    04/19/25 0307 -- 81 -- 143/83 -- -- -- -- -- --    04/19/25 0251 -- 93 -- 176/89 -- -- -- -- -- --    04/19/25 0245 -- 95 -- 165/83 -- -- -- -- -- --    04/19/25 0227 -- 101 -- 192/97 -- -- -- -- -- --    04/19/25 0211 -- 94 -- 149/80 -- -- -- -- -- --    04/19/25 0209 97.7 °F (36.5 °C) 94 18 -- -- 99 % None (Room air) -- -- 3    04/19/25 0156 -- 96 -- 141/79 -- -- -- -- -- --    04/19/25 0141 -- 90 -- 144/80 -- -- -- -- -- --    04/19/25 0134 -- 96 -- 147/85 -- -- -- -- -- --    04/19/25 0114 -- -- -- -- -- -- -- -- -- No Pain    04/19/25 0112 -- 102 -- 171/91 -- -- -- -- -- --    04/19/25 0111 -- 102 -- -- -- 96 % -- -- -- --    04/19/25 0110 -- 103 -- -- -- -- -- -- -- --    04/19/25 0106 -- 97 -- -- -- 93 % -- -- -- --    04/19/25 0102 -- 97 -- -- -- -- -- -- -- --    04/19/25 0101 -- 99 -- -- -- 94 % -- -- -- --    04/19/25 0100 -- 102 -- 159/83 -- -- -- -- -- No Pain    04/19/25 0056 -- 99 -- -- -- 94 % -- -- -- --    04/19/25 0054 -- 97 -- -- -- -- -- -- -- --    04/19/25 0051 -- 98 -- -- -- 94 % -- -- -- --    04/19/25 0050 -- 98 -- -- -- --  -- -- -- --    04/19/25 0046 -- 98 -- -- -- 98 % -- -- -- --    04/19/25 0042 -- 99 -- 161/92 -- -- -- -- -- --    04/19/25 0041 -- 100 -- -- -- 97 % -- -- -- --    04/19/25 0038 -- 104 -- -- -- -- -- -- -- --    04/19/25 0036 -- 102 -- -- -- 98 % -- -- -- --    04/19/25 0034 -- 107 -- -- -- -- -- -- -- --    04/19/25 0031 -- 103 -- -- -- 98 % -- -- -- --    04/19/25 0030 -- 104 -- -- -- -- -- -- -- --    04/19/25 0026 -- 106 -- 181/107 -- 97 % -- -- -- --    04/19/25 0011 -- 95 -- 138/64 -- -- -- -- -- --    04/19/25 0001 97.8 °F (36.6 °C) 94 18 -- -- 97 % None (Room air) -- -- No Pain    04/19/25 0000 -- 96 -- -- -- -- -- -- -- --    04/18/25 2356 -- 93 -- 123/56 -- -- -- -- -- --    04/18/25 2341 -- 93 -- 121/55 -- -- -- -- -- --    04/18/25 2326 -- 89 -- 125/60 -- -- -- -- -- --    04/18/25 2311 -- 92 -- 134/62 -- -- -- -- -- --    04/18/25 2257 -- 94 -- 127/57 -- -- -- -- -- 2    04/18/25 2243 -- 100 -- 179/95 -- -- -- -- -- --    04/18/25 2241 -- -- -- -- -- -- -- -- -- --    OBSERV: pt give power per pt request for heart burn, Dr James aware and place order at 04/18/25 2241 04/18/25 2230 -- -- -- -- -- -- -- -- -- 2    04/18/25 2226 -- 95 -- 155/83 -- -- -- -- -- --    04/18/25 2211 -- 93 -- 162/86 -- -- -- -- -- --    04/18/25 2205 97.7 °F (36.5 °C) 96 18 -- -- 97 % None (Room air) -- -- No Pain    04/18/25 2142 -- 92 -- 133/72 -- -- -- -- -- --    04/18/25 2141 -- 90 -- 129/75 -- -- -- -- -- --    04/18/25 2115 -- -- -- -- -- -- -- -- -- No Pain    04/18/25 2111 -- 98 -- 139/75 -- -- -- -- -- --    04/18/25 2041 -- 96 -- 152/73 -- -- -- -- -- --    04/18/25 2009 -- 97 -- 143/90 -- -- -- -- -- --    04/18/25 2004 98 °F (36.7 °C) 95 18 -- -- 98 % None (Room air) -- -- No Pain    04/18/25 1905 -- -- -- -- -- -- -- -- -- 4    04/18/25 1900 -- 93 -- -- -- 99 % None (Room air) -- -- --    04/18/25 1801 -- 93 20 145/84 -- 97 % None (Room air) -- Lying --    04/18/25 1636 -- 89 18 150/70 -- -- -- -- -- --     04/18/25 1630 -- -- -- -- -- 95 % -- -- -- --    04/18/25 1452 -- 86 -- 119/56 -- -- -- -- -- --    04/18/25 1449 -- 86 -- -- -- -- -- -- -- --    04/18/25 1445 -- 86 -- -- -- 97 % None (Room air) -- -- --    04/18/25 1252 -- 87 -- 141/68 -- -- -- -- -- --    04/18/25 1218 98.2 °F (36.8 °C) 86 20 -- -- 99 % None (Room air) -- -- 4    04/18/25 1152 -- 82 -- 130/66 -- -- -- -- -- --    04/18/25 1122 -- 85 -- 164/87 -- -- -- -- -- --    04/18/25 1053 -- 77 -- 141/67 -- -- -- -- -- --    04/18/25 1024 -- 74 -- -- -- 97 % None (Room air) -- -- --    04/18/25 1004 98.1 °F (36.7 °C) 74 18 132/70 -- -- -- -- -- 3    04/18/25 0948 -- 78 -- 120/69 -- -- -- -- -- --    04/18/25 0933 -- 75 -- 128/68 -- -- -- -- -- --    04/18/25 0842 -- 86 -- 165/95 -- -- -- -- -- --    04/18/25 0826 98.3 °F (36.8 °C) 78 20 -- -- 96 % None (Room air) -- -- 2    04/18/25 0808 -- 81 -- 146/83 -- -- -- -- -- --    04/18/25 0754 -- 80 -- 136/83 -- -- -- -- -- --    04/18/25 0739 -- 80 -- 161/88 -- -- -- -- -- --    04/18/25 0724 -- 85 -- 138/92 -- -- -- -- -- --    04/18/25 0717 97.8 °F (36.6 °C) -- 20 -- -- -- -- -- -- --    04/18/25 0647 -- 83 -- 172/81 -- -- -- -- -- --    04/18/25 0636 -- 83 -- 150/77 -- -- -- -- -- --    04/18/25 0627 -- 77 -- 148/74 -- -- -- -- -- --    04/18/25 0607 -- 78 18 174/84 -- -- -- -- Lying --    04/18/25 0547 -- 78 -- 140/67 -- -- -- -- -- --    04/18/25 0537 -- 77 -- 135/65 -- -- -- -- -- --    04/18/25 0526 -- 81 -- 141/68 -- -- -- -- -- --    04/18/25 0416 -- 73 -- 125/59 -- -- -- -- -- --    04/18/25 0406 -- 72 18 117/62 -- -- None (Room air) -- Lying --    04/18/25 0356 -- 75 -- 123/60 -- -- -- -- -- --    04/18/25 0346 -- 80 -- 112/60 -- -- -- -- -- --    04/18/25 0336 -- 78 -- 122/68 -- -- -- -- -- --    04/18/25 0326 -- 76 -- 112/63 -- -- -- -- -- --    04/18/25 0316 -- 78 -- 120/74 -- -- -- -- -- --    04/18/25 0306 -- 78 -- 150/87 -- -- -- -- -- --    04/18/25 0256 -- 80 -- 144/93 -- -- -- -- -- --     04/18/25 0246 -- 84 -- 150/89 -- -- -- -- -- --    04/18/25 0236 -- 86 -- 143/83 -- -- -- -- -- --    04/18/25 0226 -- 81 -- 130/75 -- -- -- -- -- --    04/18/25 0216 -- 81 -- 139/88 -- -- -- -- -- --    04/18/25 0202 -- 83 -- 139/73 -- -- -- -- -- --    04/18/25 0145 -- 82 -- 162/77 -- -- -- -- -- --    04/18/25 0115 -- 91 -- 167/92 -- -- -- -- -- --    04/18/25 0100 -- 93 -- 163/88 -- -- -- -- -- --    04/18/25 0045 -- 82 -- 163/91 -- -- -- -- -- --    04/18/25 0030 -- 83 -- 160/86 -- -- -- -- -- --    04/18/25 0015 -- 88 -- 144/81 -- -- -- -- -- --    04/18/25 0000 -- 94 20 185/83 -- -- -- -- -- --              Pertinent Labs/Diagnostic Test Results:   Radiology:  No orders to display     Cardiology:  No orders to display     GI:  No orders to display           Results from last 7 days   Lab Units 04/21/25 0921 04/21/25 0306 04/20/25 2058 04/20/25 1432 04/20/25  0757   WBC Thousand/uL 17.26* 14.35* 13.28* 12.68* 13.56*   HEMOGLOBIN g/dL 10.8* 10.1* 10.7* 10.9* 11.0*   HEMATOCRIT % 33.1* 31.4* 33.5* 33.2* 33.7*   PLATELETS Thousands/uL 261 263 276 287 292         Results from last 7 days   Lab Units 04/21/25 0921 04/21/25 0306 04/20/25 2058 04/20/25 1432 04/20/25  0757   SODIUM mmol/L 136 138 136 137 136   POTASSIUM mmol/L 4.1 3.7 3.8 3.8 3.8   CHLORIDE mmol/L 111* 111* 111* 109* 109*   CO2 mmol/L 19* 18* 16* 17* 18*   ANION GAP mmol/L 6 9 9 11 9   BUN mg/dL 14 13 14 14 14   CREATININE mg/dL 1.06 1.05 1.00 1.05 0.90   EGFR ml/min/1.73sq m 65 66 70 66 80   CALCIUM mg/dL 7.6* 7.3* 7.5* 7.7* 7.6*   MAGNESIUM mg/dL 5.2* 5.1* 5.1* 5.4* 5.2*     Results from last 7 days   Lab Units 04/21/25  0921 04/21/25  0306 04/20/25 2058 04/20/25  1432 04/20/25  0757   AST U/L 95* 109* 137* 78* 25   ALT U/L 181* 184* 206* 108* 30   ALK PHOS U/L 97 96 102 114* 107*   TOTAL PROTEIN g/dL 5.2* 5.1* 5.3* 5.6* 5.8*   ALBUMIN g/dL 2.6* 2.6* 2.7* 2.7* 2.9*   TOTAL BILIRUBIN mg/dL 0.32 0.32 0.32 0.32 0.31     Results from last 7  days   Lab Units 25  0752 25  0456 25  0406 25  0250 25  0141 25  0025 25  2232 25  2133 25  2028 25  1932 25  1832 25  1731   POC GLUCOSE mg/dl 122 116 115 109 106 102 105 100 110 99 108 111     Results from last 7 days   Lab Units 25  0921 25  0306 25  2058 25  1432 25  0757 25  0247 25  2041 25  1413 25  0741 25  0128 25  1939 25  0744   GLUCOSE RANDOM mg/dL 133 111 103 111 123 130 130 145* 135 122 106 102       Results from last 7 days   Lab Units 25  0055   CREATININE UR mg/dL 28.3   PROTEIN UR mg/dL 215.0   PROT/CREAT RATIO UR  7.6*     Past Medical History:   Diagnosis Date    Depression     years ago hx of- no meds > 19 yrs    Female infertility     [place]    Migraine     Hx of  none since middle school    Varicella     childhood chickenpox     Present on Admission:   Diet controlled gestational diabetes mellitus (GDM) in third trimester   AMA (advanced maternal age) primigravida 35+, third trimester      Admitting Diagnosis: Pregnant [Z34.90]  Hypertension [I10]  Encounter for  delivery without indication [O82]  Age/Sex: 39 y.o. female    Network Utilization Review Department  ATTENTION: Please call with any questions or concerns to [phone] and carefully listen to the prompts so that you are directed to the right person. All voicemails are confidential.   For Discharge needs, contact Care Management DC Support Team at [phone] opt. [phone]  Send all requests for admission clinical reviews, approved or denied determinations and any other requests to dedicated fax number below belonging to the campus where the patient is receiving treatment. List of dedicated fax numbers for the Facilities:  FACILITY NAME UR FAX NUMBER   ADMISSION DENIALS (Administrative/Medical Necessity) [phone]   DISCHARGE SUPPORT TEAM (NETWORK) [phone]   PARENT  CHILD HEALTH (Maternity/NICU/Pediatrics) [phone]   [facility] [phone]   [facility] [phone]   [facility] [phone]   [facility] [phone]   [facility] [phone]   [facility] [phone]   [facility] [phone]   [facility] [phone]   [facility] [phone]   [facility] [phone]   [facility] [phone]   [facility] [phone]

## 2025-04-21 NOTE — ANESTHESIA POSTPROCEDURE EVALUATION
Post-Op Assessment Note    CV Status:  Stable  Pain Score: 0    Pain management: adequate      Post-op block assessment: adhesive bandage applied, catheter intact, site cleaned and no complications   Mental Status:  Alert and awake   Hydration Status:  Euvolemic   PONV Controlled:  Controlled   Airway Patency:  Patent     Post Op Vitals Reviewed: Yes    No anethesia notable event occurred.    Staff: CRNA           Last Filed PACU Vitals:  Vitals Value Taken Time   Temp 98.2 °F (36.8 °C) 04/21/25 0655   Pulse 78 04/21/25 0655   /68 04/21/25 0655   Resp 16 04/21/25 0655   SpO2 97 % 04/21/25 0655

## 2025-04-21 NOTE — DISCHARGE SUMMARY
Discharge Summary - OB/GYN   Name: Teri Mckinney 39 y.o. female I MRN: [id number]  Unit/Bed#: -01 I Date of Admission: 2025   Date of Service: 2025 I Hospital Day: 3  { ?Quick Links I Problem List I PORCH I Billing Tip:64005}  ADMISSION  Patient of: [facility]  Admission Date: 2025   Admitting Attending: Dr. Crystal Davis MD***  Admitting Diagnoses:   Patient Active Problem List   Diagnosis    Diet controlled gestational diabetes mellitus (GDM) in third trimester    AMA (advanced maternal age) primigravida 35+, third trimester    Severe obesity due to excess calories affecting pregnancy in third trimester (HCC)    Pregnancy resulting from in vitro fertilization in third trimester    34 weeks gestation of pregnancy    Severe preeclampsia       DELIVERY  Delivery Method:     Delivery Date and Time:      Delivery Attending:  ***    DISCHARGE  Discharge Date: ***  Discharge Attending: Dr. Crystal Davis MD***  Discharge Diagnosis:   Same, Delivered  ***  Clinical course: Admission to Delivery  Teri Mckinney is a 39 y.o.  who was admitted at 34w1d for preeclampsia with severe features secondary to sustained severe range blood pressures immediately upon presentation to triage.  Patient was started on a magnesium infusion.  She required treatment with 20 and 40 of IV labetalol and was started on Procardia 30 daily.  On initial cervical exam she was closed thick and high.  She received a dose of oral Cytotec.  She then had a Huff balloon placed for cervical ripening. Following displacement of the Huff balloon she was noted to be 2 to 3 cm dilated however her cervix was still noted to be thick she therefore received a dose of vaginal Cytotec.  On the evening of , she was noted to have a bump in her creatinine from 0.78-0.93, her magnesium was therefore turned down to a rate of 1.  On exam she was unchanged and received a third dose of vaginal Cytotec.  She was then  noted to have a sustained severe range blood pressure on the morning of . She required acute treatment with labetalol 20 mg three times through out that day.  Her Procardia was increased to 60 daily.  She made changed to 3/30/-4 and was given a fourth dose of vaginal Cytotec.  She maintained to 3.5/30/-4 and was started on a Pitocin titration.  Patient received an epidural for analgesia.  Fetal station remained too high to safely AROM for several hours. AROM was able to be performed at 1200 on .  Patient was for/80/-3 at this time and she had an IUPC placed for improved titration of her Pitocin.  She reached Pitocin of 30 without any additional cervical change.  Patient then received a 2-hour Pitocin break.  She did require additioanl acute treatment with 20 of labetolol. Pitocin was then restarted and at 2-hour recheck patient had not made any additional cervical change.  Patient was counseled at this time about the recommendation for primary low-transverse  section to which she gave informed consent.     ***Reason for induction:  .     ***Pt was in spontaneous labor and managed expectantly.    ***Induction method:  , ,   .     For pain control in labor, pt received ***.  ***The labor course was complicated by ***.  She progressed to complete and began pushing.    Delivery  Route of Delivery:    ***Reason for  delivery:        Anesthesia:  ,   QBL:        QBL:        Delivery:   at      ***Laceration: Perineal:   Repaired?      Baby's Weight:  ;      Apgar scores:   and   at 1 and 5 minutes, respectively    Clinical Course: Post-Delivery:  The post delivery course was {remarkable/unremarkable:11907}.    On the day of discharge, the patient was ambulating, voiding spontaneously, tolerating oral intake, and hemodynamically stable. She was able to reasonably perform all ADLs. She had appropriate bowel function. Pain was well-controlled. She was discharged home on postpartum/postop day #***  without complications***. Patient was instructed to follow up with her OB as an outpatient and was given appropriate warnings to call her provider with problems or concerns.    Pertinent lab findings included:   Blood type {DEREK BLOOD TYPE:17251}.     Last three Hgb values:  Lab Results   Component Value Date    HGB 10.1 (L) 04/21/2025    HGB 10.7 (L) 04/20/2025    HGB 10.9 (L) 04/20/2025        Problem-specific follow-up plans included the following:  {Insert PORCH/Hospital Course prior to signing note:66663}    Discharge med list:  Contraception: ***     Medication List      CONTINUE taking these medications     Contour Next EZ w/Device Kit; Test x4 Daily or as instructed   Microlet Lancets Misc; Use 4 a Day or as instructed     ASK your doctor about these medications     Aspirin 81 81 mg EC tablet; Generic drug: aspirin   choline fenofibrate 135 MG capsule; Commonly known as: TRILIPIX   Contour Next Test test strip; Generic drug: glucose blood; Test 4 Times   Daily or as instructed   docusate sodium 100 mg capsule; Commonly known as: COLACE   PRENATAL PO   Tums 500 mg chewable tablet; Generic drug: calcium carbonate       Condition at discharge:   {condition:81755}     Disposition:   {Discharge Disposition:22158}    Planned Readmission:   {EXAM;YES/NO:19492}    Discharge Statement:  I have spent a total time of *** minutes in caring for this patient on the day of the visit/encounter. {>30 minutes of time was spent on:12784}.   preeclampsia    Systolic (12hrs), Av , Min:133 , Max:135   Diastolic (12hrs), Av, Min:76, Max:84        Diet controlled gestational diabetes mellitus (GDM) in third trimester  2hr GTT at 6 weeks PP    AMA (advanced maternal age) primigravida 35+, third trimester    S/P primary low transverse   -  mL, Hgb 10.1 --> 10.5  - Pain well controlled with oral analgesics  - Voiding spontaneously, passed VT  - Tolerating PO fluids and solids  - Ambulating without difficulty  - DVT ppx with Lovenox 40mg BID, SCDs  - Anticipate discharge POD#4      Postoperative pain        Discharge med list:  Contraception: undecided     Medication List      START taking these medications     benzocaine-menthol-lanolin-aloe 20-0.5 % topical spray; Commonly known   as: DERMOPLAST; Apply 1 Application topically every 6 (six) hours as   needed for irritation or mild pain   hydrocortisone 1 % cream; Apply 1 Application topically daily as needed   for rash or irritation   labetalol 200 mg tablet; Commonly known as: NORMODYNE; Take 1 tablet   (200 mg total) by mouth 2 (two) times a day   NIFEdipine ER 60 MG 24 hr tablet; Commonly known as: ADALAT CC; Take 1   tablet (60 mg total) by mouth 2 (two) times a day   oxyCODONE 5 immediate release tablet; Commonly known as: ROXICODONE;   Take 1 tablet (5 mg total) by mouth every 4 (four) hours as needed for   severe pain for up to 10 days Max Daily Amount: 30 mg   witch hazel-glycerin topical pad; Commonly known as: TUCKS; Apply 1 Pad   topically every 4 (four) hours as needed for irritation or hemorrhoids     CONTINUE taking these medications     choline fenofibrate 135 MG capsule; Commonly known as: TRILIPIX   Contour Next EZ w/Device Kit; Test x4 Daily or as instructed   Microlet Lancets Misc; Use 4 a Day or as instructed   PRENATAL PO   Tums 500 mg chewable tablet; Generic drug: calcium carbonate     STOP taking these medications     Aspirin 81 81 mg EC tablet; Generic drug:  aspirin   Contour Next Test test strip; Generic drug: glucose blood     ASK your doctor about these medications     acetaminophen 325 mg tablet; Commonly known as: TYLENOL; Take 2 tablets   (650 mg total) by mouth every 6 (six) hours as needed for mild pain or   moderate pain for up to 3 days; Ask about: Should I take this medication?   docusate sodium 100 mg capsule; Commonly known as: COLACE       Condition at discharge:   good     Disposition:   Home    Planned Readmission:   No    Vinicio Mac MD  Obstetrics & Gynecology PGY-1  4/30/2025  6:58 PM

## 2025-04-21 NOTE — PLAN OF CARE
Problem: PAIN - ADULT  Goal: Verbalizes/displays adequate comfort level or baseline comfort level  Description: Interventions:- Encourage patient to monitor pain and request assistance- Assess pain using appropriate pain scale- Administer analgesics based on type and severity of pain and evaluate response- Implement non-pharmacological measures as appropriate and evaluate response- Consider cultural and social influences on pain and pain management- Notify physician/advanced practitioner if interventions unsuccessful or patient reports new pain  Outcome: Progressing     Problem: INFECTION - ADULT  Goal: Absence or prevention of progression during hospitalization  Description: INTERVENTIONS:- Assess and monitor for signs and symptoms of infection- Monitor lab/diagnostic results- Monitor all insertion sites, i.e. indwelling lines, tubes, and drains- Monitor endotracheal if appropriate and nasal secretions for changes in amount and color- [place] appropriate cooling/warming therapies per order- Administer medications as ordered- Instruct and encourage patient and family to use good hand hygiene technique- Identify and instruct in appropriate isolation precautions for identified infection/condition  Outcome: Progressing  Goal: Absence of fever/infection during neutropenic period  Description: INTERVENTIONS:- Monitor WBC  Outcome: Progressing     Problem: SAFETY ADULT  Goal: Patient will remain free of falls  Description: INTERVENTIONS:- Educate patient/family on patient safety including physical limitations- Instruct patient to call for assistance with activity - Consult OT/PT to assist with strengthening/mobility - Keep Call bell within reach- Keep bed low and locked with side rails adjusted as appropriate- Keep care items and personal belongings within reach- Initiate and maintain comfort rounds  Outcome: Progressing  Goal: Maintain or return to baseline ADL function  Description: INTERVENTIONS:-  Assess patient's  ability to carry out ADLs; assess patient's baseline for ADL function and identify physical deficits which impact ability to perform ADLs (bathing, care of mouth/teeth, toileting, grooming, dressing, etc.)- Assess/evaluate cause of self-care deficits - Assess range of motion- Assess patient's mobility; develop plan if impaired- Assess patient's need for assistive devices and provide as appropriate- Encourage maximum independence but intervene and supervise when necessary- Involve family in performance of ADLs- Assess for home care needs following discharge - Consider OT consult to assist with ADL evaluation and planning for discharge- Provide patient education as appropriate  Outcome: Progressing  Goal: Maintains/Returns to pre admission functional level  Description: INTERVENTIONS:- Perform AM-PAC 6 Click Basic Mobility/ Daily Activity assessment daily.- Set and communicate daily mobility goal to care team and patient/family/caregiver. - Collaborate with rehabilitation services on mobility goals if consulted  Outcome: Progressing     Problem: Knowledge Deficit  Goal: Patient/family/caregiver demonstrates understanding of disease process, treatment plan, medications, and discharge instructions  Description: Complete learning assessment and assess knowledge base.Interventions:- Provide teaching at level of understanding- Provide teaching via preferred learning methods  Outcome: Progressing  Goal: Verbalizes understanding of labor plan  Description: Assess patient/family/caregiver's baseline knowledge level and ability to understand information.  Provide education via patient/family/caregiver's preferred learning method at appropriate level of understanding. 1. Provide teaching at level of understanding.2. Provide teaching via preferred learning method(s).  Outcome: Progressing     Problem: DISCHARGE PLANNING  Goal: Discharge to home or other facility with appropriate resources  Description: INTERVENTIONS:- Identify  barriers to discharge w/patient and caregiver- Arrange for needed discharge resources and transportation as appropriate- Identify discharge learning needs (meds, wound care, etc.)- Arrange for interpretive services to assist at discharge as needed- Refer to Case Management Department for coordinating discharge planning if the patient needs post-hospital services based on physician/advanced practitioner order or complex needs related to functional status, cognitive ability, or social support system  Outcome: Progressing     Problem: Labor & Delivery  Goal: Manages discomfort  Description: Assess and monitor for signs and symptoms of discomfort.  Assess patient's pain level regularly and per hospital policy.  Administer medications as ordered. Support use of nonpharmacological methods to help control pain such as distraction, imagery, relaxation, and application of heat and cold.  Collaborate with interdisciplinary team and patient to determine appropriate pain management plan.1. Include patient in decisions related to comfort.2. Offer non-pharmacological pain management interventions.3. Report ineffective pain management to physician.  Outcome: Progressing  Goal: Patient vital signs are stable  Description: 1. Assess vital signs - vaginal delivery.  Outcome: Progressing     Problem: BIRTH - VAGINAL/ SECTION  Goal: Fetal and maternal status remain reassuring during the birth process  Description: INTERVENTIONS:- Monitor vital signs- Monitor fetal heart rate- Monitor uterine activity- Monitor labor progression (vaginal delivery)- DVT prophylaxis- Antibiotic prophylaxis  Outcome: Progressing  Goal: Emotionally satisfying birthing experience for mother/fetus  Description: Interventions:- Assess, plan, implement and evaluate the nursing care given to the patient in labor- Advocate the philosophy that each childbirth experience is a unique experience and support the family's chosen level of involvement and control  during the labor process - Actively participate in both the patient's and family's teaching of the birth process- Consider cultural, Holiness and age-specific factors and plan care for the patient in labor  Outcome: Progressing

## 2025-04-21 NOTE — OB LABOR/OXYTOCIN SAFETY PROGRESS
Oxytocin Safety Progress Check Note - Teri Mckinney 39 y.o. female MRN: [id number]    Unit/Bed#: -01 Encounter: [id number]    Dose (bryant-units/min) Oxytocin: 30 bryant-units/min  Contraction Frequency (minutes): 3  Contraction Intensity: Mild/Moderate  Uterine Activity Characteristics: Regular  Cervical Dilation: 4        Cervical Effacement: 50  Fetal Station: -3  Baseline Rate (FHR): 130 bpm  Fetal Heart Rate (FHT): 144 BPM  FHR Category: I               Vital Signs:   Vitals:    04/20/25 2102   BP:    Pulse:    Resp:    Temp: 98.3 °F (36.8 °C)   SpO2:        Notes/comments:   SVE as above, unchanged from prior. FHT Cat I. Pitocin is at 30, will plan for 2 hour pit break and restart titration. D/w Dr. Hever Torres MD 4/20/2025 9:13 PM

## 2025-04-22 DIAGNOSIS — Z86.32 HISTORY OF GESTATIONAL DIABETES MELLITUS (GDM), NOT CURRENTLY PREGNANT: ICD-10-CM

## 2025-04-22 DIAGNOSIS — Z13.1 DIABETES MELLITUS SCREENING: Primary | ICD-10-CM

## 2025-04-22 PROBLEM — G89.18 POSTOPERATIVE PAIN: Status: ACTIVE | Noted: 2025-04-22

## 2025-04-22 LAB
ALBUMIN SERPL BCG-MCNC: 2.7 G/DL (ref 3.5–5)
ALP SERPL-CCNC: 100 U/L (ref 34–104)
ALT SERPL W P-5'-P-CCNC: 121 U/L (ref 7–52)
ANION GAP SERPL CALCULATED.3IONS-SCNC: 7 MMOL/L (ref 4–13)
AST SERPL W P-5'-P-CCNC: 51 U/L (ref 13–39)
BILIRUB SERPL-MCNC: 0.26 MG/DL (ref 0.2–1)
BUN SERPL-MCNC: 13 MG/DL (ref 5–25)
CALCIUM ALBUM COR SERPL-MCNC: 8.3 MG/DL (ref 8.3–10.1)
CALCIUM SERPL-MCNC: 7.3 MG/DL (ref 8.4–10.2)
CHLORIDE SERPL-SCNC: 107 MMOL/L (ref 96–108)
CO2 SERPL-SCNC: 21 MMOL/L (ref 21–32)
CREAT SERPL-MCNC: 0.96 MG/DL (ref 0.6–1.3)
ERYTHROCYTE [DISTWIDTH] IN BLOOD BY AUTOMATED COUNT: 13.5 % (ref 11.6–15.1)
GFR SERPL CREATININE-BSD FRML MDRD: 74 ML/MIN/1.73SQ M
GLUCOSE SERPL-MCNC: 113 MG/DL (ref 65–140)
HCT VFR BLD AUTO: 30.6 % (ref 34.8–46.1)
HGB BLD-MCNC: 9.9 G/DL (ref 11.5–15.4)
MAGNESIUM SERPL-MCNC: 5.2 MG/DL (ref 1.9–2.7)
MCH RBC QN AUTO: 30.4 PG (ref 26.8–34.3)
MCHC RBC AUTO-ENTMCNC: 32.4 G/DL (ref 31.4–37.4)
MCV RBC AUTO: 94 FL (ref 82–98)
PLATELET # BLD AUTO: 262 THOUSANDS/UL (ref 149–390)
PMV BLD AUTO: 10.7 FL (ref 8.9–12.7)
POTASSIUM SERPL-SCNC: 4 MMOL/L (ref 3.5–5.3)
PROT SERPL-MCNC: 5.3 G/DL (ref 6.4–8.4)
RBC # BLD AUTO: 3.26 MILLION/UL (ref 3.81–5.12)
SODIUM SERPL-SCNC: 135 MMOL/L (ref 135–147)
WBC # BLD AUTO: 17.89 THOUSAND/UL (ref 4.31–10.16)

## 2025-04-22 PROCEDURE — 80053 COMPREHEN METABOLIC PANEL: CPT

## 2025-04-22 PROCEDURE — 99254 IP/OBS CNSLTJ NEW/EST MOD 60: CPT

## 2025-04-22 PROCEDURE — 99024 POSTOP FOLLOW-UP VISIT: CPT | Performed by: OBSTETRICS & GYNECOLOGY

## 2025-04-22 PROCEDURE — 85027 COMPLETE CBC AUTOMATED: CPT

## 2025-04-22 PROCEDURE — 83735 ASSAY OF MAGNESIUM: CPT

## 2025-04-22 RX ORDER — HYDROMORPHONE HCL/PF 1 MG/ML
0.2 SYRINGE (ML) INJECTION EVERY 2 HOUR PRN
Status: DISCONTINUED | OUTPATIENT
Start: 2025-04-22 | End: 2025-04-25 | Stop reason: HOSPADM

## 2025-04-22 RX ORDER — LABETALOL 200 MG/1
200 TABLET, FILM COATED ORAL EVERY 12 HOURS SCHEDULED
Status: DISCONTINUED | OUTPATIENT
Start: 2025-04-22 | End: 2025-04-22

## 2025-04-22 RX ORDER — ACETAMINOPHEN 325 MG/1
975 TABLET ORAL EVERY 6 HOURS SCHEDULED
Status: COMPLETED | OUTPATIENT
Start: 2025-04-22 | End: 2025-04-24

## 2025-04-22 RX ORDER — OXYCODONE HYDROCHLORIDE 5 MG/1
5 TABLET ORAL EVERY 4 HOURS PRN
Refills: 0 | Status: DISCONTINUED | OUTPATIENT
Start: 2025-04-22 | End: 2025-04-25 | Stop reason: HOSPADM

## 2025-04-22 RX ADMIN — DOCUSATE SODIUM 100 MG: 100 CAPSULE, LIQUID FILLED ORAL at 08:12

## 2025-04-22 RX ADMIN — DOCUSATE SODIUM 100 MG: 100 CAPSULE, LIQUID FILLED ORAL at 20:11

## 2025-04-22 RX ADMIN — ACETAMINOPHEN 975 MG: 325 TABLET, FILM COATED ORAL at 20:10

## 2025-04-22 RX ADMIN — ENOXAPARIN SODIUM 40 MG: 40 INJECTION SUBCUTANEOUS at 08:12

## 2025-04-22 RX ADMIN — METRONIDAZOLE 500 MG: 500 TABLET ORAL at 08:12

## 2025-04-22 RX ADMIN — ACETAMINOPHEN 650 MG: 325 TABLET, FILM COATED ORAL at 06:16

## 2025-04-22 RX ADMIN — OXYCODONE HYDROCHLORIDE 5 MG: 5 TABLET ORAL at 21:08

## 2025-04-22 RX ADMIN — NIFEDIPINE 60 MG: 60 TABLET, EXTENDED RELEASE ORAL at 08:12

## 2025-04-22 RX ADMIN — METRONIDAZOLE 500 MG: 500 TABLET ORAL at 20:10

## 2025-04-22 RX ADMIN — Medication 2.5 MG: at 09:27

## 2025-04-22 RX ADMIN — CEPHALEXIN 500 MG: 500 CAPSULE ORAL at 20:10

## 2025-04-22 RX ADMIN — ENOXAPARIN SODIUM 40 MG: 40 INJECTION SUBCUTANEOUS at 20:10

## 2025-04-22 RX ADMIN — FUROSEMIDE 20 MG: 20 TABLET ORAL at 08:12

## 2025-04-22 RX ADMIN — CEPHALEXIN 500 MG: 500 CAPSULE ORAL at 06:16

## 2025-04-22 RX ADMIN — CEPHALEXIN 500 MG: 500 CAPSULE ORAL at 12:59

## 2025-04-22 RX ADMIN — ACETAMINOPHEN 975 MG: 325 TABLET, FILM COATED ORAL at 12:59

## 2025-04-22 RX ADMIN — NIFEDIPINE 60 MG: 60 TABLET, EXTENDED RELEASE ORAL at 20:10

## 2025-04-22 NOTE — ASSESSMENT & PLAN NOTE
Lab Results   Component Value Date    HGBA1C 6.0 (H) 03/29/2025       Recent Labs     04/21/25  0250 04/21/25  0406 04/21/25  0456 04/21/25  0752   POCGLU 109 115 116 122       Blood Sugar Average: Last 72 hrs:  (P) [id number]

## 2025-04-22 NOTE — PLAN OF CARE
Problem: PAIN - ADULT  Goal: Verbalizes/displays adequate comfort level or baseline comfort level  Description: Interventions:- Encourage patient to monitor pain and request assistance- Assess pain using appropriate pain scale- Administer analgesics based on type and severity of pain and evaluate response- Implement non-pharmacological measures as appropriate and evaluate response- Consider cultural and social influences on pain and pain management- Notify physician/advanced practitioner if interventions unsuccessful or patient reports new pain  Outcome: Progressing     Problem: INFECTION - ADULT  Goal: Absence or prevention of progression during hospitalization  Description: INTERVENTIONS:- Assess and monitor for signs and symptoms of infection- Monitor lab/diagnostic results- Monitor all insertion sites, i.e. indwelling lines, tubes, and drains- Monitor endotracheal if appropriate and nasal secretions for changes in amount and color- [place] appropriate cooling/warming therapies per order- Administer medications as ordered- Instruct and encourage patient and family to use good hand hygiene technique- Identify and instruct in appropriate isolation precautions for identified infection/condition  Outcome: Progressing  Goal: Absence of fever/infection during neutropenic period  Description: INTERVENTIONS:- Monitor WBC  Outcome: Progressing     Problem: SAFETY ADULT  Goal: Patient will remain free of falls  Description: INTERVENTIONS:- Educate patient/family on patient safety including physical limitations- Instruct patient to call for assistance with activity - Consult OT/PT to assist with strengthening/mobility - Keep Call bell within reach- Keep bed low and locked with side rails adjusted as appropriate- Keep care items and personal belongings within reach- Initiate and maintain comfort rounds  Outcome: Progressing  Goal: Maintain or return to baseline ADL function  Description: INTERVENTIONS:-  Assess patient's  ability to carry out ADLs; assess patient's baseline for ADL function and identify physical deficits which impact ability to perform ADLs (bathing, care of mouth/teeth, toileting, grooming, dressing, etc.)- Assess/evaluate cause of self-care deficits - Assess range of motion- Assess patient's mobility; develop plan if impaired- Assess patient's need for assistive devices and provide as appropriate- Encourage maximum independence but intervene and supervise when necessary- Involve family in performance of ADLs- Assess for home care needs following discharge - Consider OT consult to assist with ADL evaluation and planning for discharge- Provide patient education as appropriate  Outcome: Progressing  Goal: Maintains/Returns to pre admission functional level  Description: INTERVENTIONS:- Perform AM-PAC 6 Click Basic Mobility/ Daily Activity assessment daily.- Set and communicate daily mobility goal to care team and patient/family/caregiver. - Collaborate with rehabilitation services on mobility goals if consulted  Outcome: Progressing     Problem: Knowledge Deficit  Goal: Patient/family/caregiver demonstrates understanding of disease process, treatment plan, medications, and discharge instructions  Description: Complete learning assessment and assess knowledge base.Interventions:- Provide teaching at level of understanding- Provide teaching via preferred learning methods  Outcome: Progressing  Goal: Verbalizes understanding of labor plan  Description: Assess patient/family/caregiver's baseline knowledge level and ability to understand information.  Provide education via patient/family/caregiver's preferred learning method at appropriate level of understanding. 1. Provide teaching at level of understanding.2. Provide teaching via preferred learning method(s).  Outcome: Progressing     Problem: DISCHARGE PLANNING  Goal: Discharge to home or other facility with appropriate resources  Description: INTERVENTIONS:- Identify  barriers to discharge w/patient and caregiver- Arrange for needed discharge resources and transportation as appropriate- Identify discharge learning needs (meds, wound care, etc.)- Arrange for interpretive services to assist at discharge as needed- Refer to Case Management Department for coordinating discharge planning if the patient needs post-hospital services based on physician/advanced practitioner order or complex needs related to functional status, cognitive ability, or social support system  Outcome: Progressing     Problem: Labor & Delivery  Goal: Manages discomfort  Description: Assess and monitor for signs and symptoms of discomfort.  Assess patient's pain level regularly and per hospital policy.  Administer medications as ordered. Support use of nonpharmacological methods to help control pain such as distraction, imagery, relaxation, and application of heat and cold.  Collaborate with interdisciplinary team and patient to determine appropriate pain management plan.1. Include patient in decisions related to comfort.2. Offer non-pharmacological pain management interventions.3. Report ineffective pain management to physician.  Outcome: Progressing  Goal: Patient vital signs are stable  Description: 1. Assess vital signs - vaginal delivery.  Outcome: Progressing     Problem: BIRTH - VAGINAL/ SECTION  Goal: Fetal and maternal status remain reassuring during the birth process  Description: INTERVENTIONS:- Monitor vital signs- Monitor fetal heart rate- Monitor uterine activity- Monitor labor progression (vaginal delivery)- DVT prophylaxis- Antibiotic prophylaxis  Outcome: Progressing  Goal: Emotionally satisfying birthing experience for mother/fetus  Description: Interventions:- Assess, plan, implement and evaluate the nursing care given to the patient in labor- Advocate the philosophy that each childbirth experience is a unique experience and support the family's chosen level of involvement and control  during the labor process - Actively participate in both the patient's and family's teaching of the birth process- Consider cultural, Religion and age-specific factors and plan care for the patient in labor  Outcome: Progressing

## 2025-04-22 NOTE — PLAN OF CARE
Problem: PAIN - ADULT  Goal: Verbalizes/displays adequate comfort level or baseline comfort level  Description: Interventions:- Encourage patient to monitor pain and request assistance- Assess pain using appropriate pain scale- Administer analgesics based on type and severity of pain and evaluate response- Implement non-pharmacological measures as appropriate and evaluate response- Consider cultural and social influences on pain and pain management- Notify physician/advanced practitioner if interventions unsuccessful or patient reports new pain  Outcome: Progressing     Problem: INFECTION - ADULT  Goal: Absence or prevention of progression during hospitalization  Description: INTERVENTIONS:- Assess and monitor for signs and symptoms of infection- Monitor lab/diagnostic results- Monitor all insertion sites, i.e. indwelling lines, tubes, and drains- Monitor endotracheal if appropriate and nasal secretions for changes in amount and color- [place] appropriate cooling/warming therapies per order- Administer medications as ordered- Instruct and encourage patient and family to use good hand hygiene technique- Identify and instruct in appropriate isolation precautions for identified infection/condition  Outcome: Progressing  Goal: Absence of fever/infection during neutropenic period  Description: INTERVENTIONS:- Monitor WBC  Outcome: Progressing     Problem: SAFETY ADULT  Goal: Patient will remain free of falls  Description: INTERVENTIONS:- Educate patient/family on patient safety including physical limitations- Instruct patient to call for assistance with activity - Consult OT/PT to assist with strengthening/mobility - Keep Call bell within reach- Keep bed low and locked with side rails adjusted as appropriate- Keep care items and personal belongings within reach- Initiate and maintain comfort rounds  Outcome: Progressing  Goal: Maintain or return to baseline ADL function  Description: INTERVENTIONS:-  Assess patient's  ability to carry out ADLs; assess patient's baseline for ADL function and identify physical deficits which impact ability to perform ADLs (bathing, care of mouth/teeth, toileting, grooming, dressing, etc.)- Assess/evaluate cause of self-care deficits - Assess range of motion- Assess patient's mobility; develop plan if impaired- Assess patient's need for assistive devices and provide as appropriate- Encourage maximum independence but intervene and supervise when necessary- Involve family in performance of ADLs- Assess for home care needs following discharge - Consider OT consult to assist with ADL evaluation and planning for discharge- Provide patient education as appropriate  Outcome: Progressing  Goal: Maintains/Returns to pre admission functional level  Description: INTERVENTIONS:- Perform AM-PAC 6 Click Basic Mobility/ Daily Activity assessment daily.- Set and communicate daily mobility goal to care team and patient/family/caregiver. - Collaborate with rehabilitation services on mobility goals if consulted  Outcome: Progressing     Problem: Knowledge Deficit  Goal: Patient/family/caregiver demonstrates understanding of disease process, treatment plan, medications, and discharge instructions  Description: Complete learning assessment and assess knowledge base.Interventions:- Provide teaching at level of understanding- Provide teaching via preferred learning methods  Outcome: Progressing  Goal: Verbalizes understanding of labor plan  Description: Assess patient/family/caregiver's baseline knowledge level and ability to understand information.  Provide education via patient/family/caregiver's preferred learning method at appropriate level of understanding. 1. Provide teaching at level of understanding.2. Provide teaching via preferred learning method(s).  Outcome: Progressing     Problem: DISCHARGE PLANNING  Goal: Discharge to home or other facility with appropriate resources  Description: INTERVENTIONS:- Identify  barriers to discharge w/patient and caregiver- Arrange for needed discharge resources and transportation as appropriate- Identify discharge learning needs (meds, wound care, etc.)- Arrange for interpretive services to assist at discharge as needed- Refer to Case Management Department for coordinating discharge planning if the patient needs post-hospital services based on physician/advanced practitioner order or complex needs related to functional status, cognitive ability, or social support system  Outcome: Progressing     Problem: Labor & Delivery  Goal: Manages discomfort  Description: Assess and monitor for signs and symptoms of discomfort.  Assess patient's pain level regularly and per hospital policy.  Administer medications as ordered. Support use of nonpharmacological methods to help control pain such as distraction, imagery, relaxation, and application of heat and cold.  Collaborate with interdisciplinary team and patient to determine appropriate pain management plan.1. Include patient in decisions related to comfort.2. Offer non-pharmacological pain management interventions.3. Report ineffective pain management to physician.  Outcome: Progressing  Goal: Patient vital signs are stable  Description: 1. Assess vital signs - vaginal delivery.  Outcome: Progressing     Problem: BIRTH - VAGINAL/ SECTION  Goal: Fetal and maternal status remain reassuring during the birth process  Description: INTERVENTIONS:- Monitor vital signs- Monitor fetal heart rate- Monitor uterine activity- Monitor labor progression (vaginal delivery)- DVT prophylaxis- Antibiotic prophylaxis  Outcome: Progressing  Goal: Emotionally satisfying birthing experience for mother/fetus  Description: Interventions:- Assess, plan, implement and evaluate the nursing care given to the patient in labor- Advocate the philosophy that each childbirth experience is a unique experience and support the family's chosen level of involvement and control  during the labor process - Actively participate in both the patient's and family's teaching of the birth process- Consider cultural, Scientology and age-specific factors and plan care for the patient in labor  Outcome: Progressing

## 2025-04-22 NOTE — PROGRESS NOTES
Progress Note - OB/GYN   Name: Teri Mckinney 39 y.o. female I MRN: [id number]  Unit/Bed#: -01 I Date of Admission: 2025   Date of Service: 2025 I Hospital Day: 4     Assessment & Plan  Severe preeclampsia  Met criteria for severe features for sustained severe range blood pressures  Patient s/p 2 doses of betamethasone -  PreE w SF (SRBP): CBCwnl, CMP notable for AST/AL, CrT; p:c 7.6; s/p 24hr PP mag  - AST/ALT: 78/103 () -> 137/206 ( PM) -> 109/184 () -> 95/181 () -> 64/146 () -> 51/121 ()  - Cr: 0.9 () -> 1.05 () -> 1 () -> 1.05 () -> 1.06 () -> 1.01 () -> 0.96 ()  Current regimen: Procardia XL 60mg BID, lasix protocol  - : 20/40 Lab, Procardia 30qD  - : Procardia 60qD, IV Lab   - : Procardia 60 AM + 30 PM, IV Lab 20 (not given)  - : Procardia 60mg BID, lasix protocol    Monitor blood pressures and signs/symptoms of worsening preeclampsia  Consider adding labetalol 200mg BID    Systolic (12hrs), Av , Min:127 , Max:159   Diastolic (12hrs), Av, Min:68, Max:93        Diet controlled gestational diabetes mellitus (GDM) in third trimester  2hr GTT at 6 weeks PP    AMA (advanced maternal age) primigravida 35+, third trimester    S/P primary low transverse   -  mL, Hgb 10.1 --> 10.5  - Pain well controlled with oral analgesics  - Voiding spontaneously, passed VT  - Tolerating PO fluids and solids  - Ambulating without difficulty  - DVT ppx with Lovenox 40mg BID, SCDs  - Anticipate discharge POD#3-4        OB Post-Partum Progress Note  Subjective   Post delivery. Patient is doing well. Lochia WNL. Pain well controlled. She denies headaches, vision changes, RUQ tenderness. She denies lightheadedness, dizziness, palpitations, or diaphoresis. Overnight, her old abdominal dressing was saturated with serosanguinous fluid.         Pain: yes, cramping, improved with meds  Tolerating PO: yes  Voiding:  yes  Flatus: yes  BM: no  Ambulating: no  Chest pain: no  Shortness of breath: no  Leg pain: no  Lochia: WNL    Objective :  Temp:  [97.4 °F (36.3 °C)-98.3 °F (36.8 °C)] 97.6 °F (36.4 °C)  HR:  [] 105  BP: (118-166)/(68-93) 127/69  Resp:  [16-19] 18  SpO2:  [94 %-99 %] 99 %  O2 Device: None (Room air)    Physical Exam  Vitals reviewed.   Constitutional:       Appearance: Normal appearance.   HENT:      Head: Normocephalic and atraumatic.   Eyes:      Extraocular Movements: Extraocular movements intact.   Cardiovascular:      Rate and Rhythm: Normal rate and regular rhythm.      Pulses: Normal pulses.      Heart sounds: Normal heart sounds.   Pulmonary:      Effort: Pulmonary effort is normal. No respiratory distress.      Breath sounds: Normal breath sounds. No wheezing.   Abdominal:      General: Abdomen is flat. There is no distension.      Palpations: Abdomen is soft.      Tenderness: There is no abdominal tenderness.      Comments: Fundus firm and non-tender  Incision C/D/I  New abdominal dressing without saturation   Musculoskeletal:         General: Normal range of motion.      Cervical back: Normal range of motion.   Skin:     General: Skin is warm and dry.   Neurological:      General: No focal deficit present.      Mental Status: She is alert. Mental status is at baseline.   Psychiatric:         Mood and Affect: Mood normal.         Behavior: Behavior normal.           Lab Results: I have reviewed the following results:  Lab Results   Component Value Date    WBC 17.89 (H) 04/22/2025    HGB 9.9 (L) 04/22/2025    HCT 30.6 (L) 04/22/2025    MCV 94 04/22/2025     04/22/2025       Vinicio Mac MD  Obstetrics & Gynecology PGY-1  4/22/2025  5:59 AM

## 2025-04-22 NOTE — ASSESSMENT & PLAN NOTE
Multimodal analgesia:  Increase acetaminophen to 975 mg p.o. every 6 hours scheduled.  Hold off on additional NSAIDs in setting of low GFR.  Decrease oxycodone to 2.5 mg / 5 mg every 4 hours as needed for moderate/severe pain  Decrease IV Dilaudid to 0.2 mg every 2 hours as needed for breakthrough pain  Narcan as needed for respiratory depression/opioid reversal    Symptom management:  Nalbuphine 3 mg IV every 3 hours as needed for pruritus  Benadryl 25 mg IV every 6 hours as needed for itching  Atarax 25 mg p.o. every 6 hours as needed for itching/anxiety.  Zofran every 8 hours as needed for nausea/vomiting  Reglan 10 mg IV every 6 hours as needed for nausea/vomiting

## 2025-04-22 NOTE — CONSULTS
Consultation - Acute Pain   Name: Teri Mckinney 39 y.o. female I MRN: [id number]  Unit/Bed#: -01 I Date of Admission: 2025   Date of Service: 2025 I Hospital Day: 4   Inpatient consult to Acute Pain Service  Consult performed by: GODFREY Vinson  Consult ordered by: Mariano Pierce MD        Physician Requesting Evaluation: Genoveva Kathleen MD   Reason for Evaluation / Principal Problem: duramorph follow up    Assessment & Plan  S/P primary low transverse   Status post primary low-transverse  2025 and received neuraxial Duramorph.    Neuraxial Duramorph:     Methadone and Duramorph Intra-procedure Administrations (last 48 hours) Showing orders from other encounters Morphine (Duramorph) may show additional administrations that are not neuraxial. Review route and comments.      Date/Time Action Medication Dose    25 0606 Given    morphine (PF) (DURAMORPH) injection 3 mg           Monitoring to continue for 24 hours post Duramorph administration:  Monitor pain, HR, BP, respirations, level of sedation and oxygen levels as ordered  Continuous pulse oximetry (or capnography) for 24 hours.  Notify Anesthesia/Acute Pain Services for the following:  Persistent pruritis, persistent nausea, or if respiratory rate is less than or equal to 10 breaths per minute or if pain is unrelieved or if patient is excessively sedated or O2 Sat less than 90% or EtCO2 outside the parameters of Respiratory Therapy Capnography/EtCO2 policy  No narcotics / sedatives / sleeping agents not ordered by Anesthesia/Acute Pain Service for 24 hours post duramorph      Postoperative pain  Multimodal analgesia:  Increase acetaminophen to 975 mg p.o. every 6 hours scheduled.  Hold off on additional NSAIDs in setting of low GFR.  Decrease oxycodone to 2.5 mg / 5 mg every 4 hours as needed for moderate/severe pain  Decrease IV Dilaudid to 0.2 mg every 2 hours as needed for breakthrough pain  Narcan  as needed for respiratory depression/opioid reversal    Symptom management:  Nalbuphine 3 mg IV every 3 hours as needed for pruritus  Benadryl 25 mg IV every 6 hours as needed for itching  Atarax 25 mg p.o. every 6 hours as needed for itching/anxiety.  Zofran every 8 hours as needed for nausea/vomiting  Reglan 10 mg IV every 6 hours as needed for nausea/vomiting          APS will sign off at this time. Thank you for the consult. All opioids and other analgesics to be written at discretion of primary team. Please contact Acute Pain Service - via SecureChat from [phone] with additional questions or concerns. See SecureChat or [facility] Online[employer] for additional contacts and after hours information.    History of Present Illness    HPI: Teri Mckinney is a 39 y.o. year old female who is status post primary low-transverse  on 2025 and received neuraxial Duramorph.  APS consulted for Duramorph follow-up.    Patient seen sitting edge of bed this morning, without acute distress.  She reports moderate abdominal pain along the incision which she states is tight/crampy in nature.  Patient states she has been up and ambulating and voiding in the bathroom.  Denies nausea/vomiting and has been tolerating p.o. intake.  Discussed trialing low-dose oxycodone for improved pain control and she is in agreement.  Patient reports previous intolerance to hydrocodone products.    Current pain location(s): Pain Score: 6  Pain Location/Orientation: Location: Incision  Pain Scale: Pain Assessment Tool: 0-10      I have reviewed the patient's controlled substance dispensing history in the Prescription Drug Monitoring Program in compliance with the [place] regulations before prescribing any controlled substances.     Review of Systems   Gastrointestinal:  Positive for abdominal pain.   All other systems reviewed and are negative.    Medical History Review: I have reviewed the patient's PMH, PSH, Social History, Family History, Meds, and Allergies      Objective :  Temp:  [97.4 °F (36.3 °C)-98.3 °F (36.8 °C)] 97.6 °F (36.4 °C)  HR:  [] 88  BP: (127-159)/(68-93) 135/68  Resp:  [17-19] 19  SpO2:  [94 %-99 %] 97 %  O2 Device: None (Room air)    Physical Exam  Vitals reviewed.   Constitutional:       General: She is not in acute distress.  HENT:      Head: Normocephalic and atraumatic.   Abdominal:      Tenderness: There is abdominal tenderness.   Musculoskeletal:      Right lower leg: Edema present.      Left lower leg: Edema present.   Skin:     General: Skin is warm and dry.   Neurological:      Mental Status: She is alert and oriented to person, place, and time. Mental status is at baseline.   Psychiatric:         Mood and Affect: Mood normal.         Behavior: Behavior normal.          Lab Results: I have reviewed the following results:  Estimated Creatinine Clearance: 97 mL/min (by C-G formula based on SCr of 0.96 mg/dL).  Lab Results   Component Value Date    WBC 17.89 (H) 04/22/2025    WBC 7.39 04/24/2015    HGB 9.9 (L) 04/22/2025    HGB 11.7 04/24/2015    HCT 30.6 (L) 04/22/2025    HCT 35.5 04/24/2015     04/22/2025     04/24/2015         Component Value Date/Time     04/22/2015 1341    K 4.0 04/22/2025 0205    K 3.5 04/22/2015 1341     04/22/2025 0205     04/22/2015 1341    CO2 21 04/22/2025 0205    CO2 25 04/22/2015 1341    BUN 13 04/22/2025 0205    BUN 5 04/22/2015 1341    CREATININE 0.96 04/22/2025 0205    CREATININE 0.68 04/22/2015 1341         Component Value Date/Time    CALCIUM 7.3 (L) 04/22/2025 0205    CALCIUM 9.2 04/22/2015 1341    ALKPHOS 100 04/22/2025 0205    AST 51 (H) 04/22/2025 0205     (H) 04/22/2025 0205    TP 5.3 (L) 04/22/2025 0205    ALB 2.7 (L) 04/22/2025 0205       Imaging Results Review: No pertinent imaging studies reviewed.  Other Study Results Review: No additional pertinent studies reviewed.

## 2025-04-22 NOTE — PROGRESS NOTES
Today's Date: 4/22/2025  Pt's Preferred Lab: None Specified  Ambulatory Order    Lab Ordered: 2hr (75GM) GTT   Reason: to screen for T2DM s/p delivery r/t H/O GDM  Time-Frame to be collected: 4-12 weeks postpartum    Patient Instructions: Fasting = Do NOT eat or drink anything except WATER for at least 8 hours before the test.    *For [facility], please call [phone] to schedule.   *To request lab be sent to alternative lab including [facility] or [facility], Call: [phone] or Message Diabetes Team via LocalOn Message to GODFREY Nayak RD   Diabetes Educator   [facility] - Maternal Fetal Medicine  Diabetes and Pregnancy Program

## 2025-04-22 NOTE — CONSULTS
Initial Consult:     Met with Teri who's baby girl (born at 34 4/7 weeks gestation) is in NICU. Teri will be set up pumping today, currently went down to NICU to visit her baby. Start of pumping was delayed related to mom's medical condition. She will call for pump set up after NICU visit.        04/22/25 1030   Breasts/Nipples   Left Breast Soft   Right Breast Soft   Left Nipple Everted  (short shank)   Right Nipple Everted  (short shank)   Breastfeeding Progress Not yet established;Pumping only  (will begin pumping today, delayed d/t mom's medical condition.)   Reasons for not Breastfeeding Infant medical condition

## 2025-04-22 NOTE — LACTATION NOTE
Follow Up Lactation:     Set Teri up pumping for her baby girl in NICU. Mom provided with and discussed Ready Set Baby, Hand expression and increasing supply for NICU baby Information. Reviewed pumping log and expectations for pumping output in the first week. Reviewed cycle pumping and appropriate pump settings, as well as pumping for 15 -20 min 8-12 times per day. To establish/protect her milk supply.    Discussed hygiene of hands and supplies as well as assembly, placement of flanges, size of flanges, preparing the breast,labeling of milk, storage, and preparation of stored milk.      Encouraged Mom to discuss putting baby to the breast with the NICU team when baby is medically stable to do so. Also Encouraged Mom to call for lactation support as needed throughout her stay.

## 2025-04-22 NOTE — NURSING NOTE
Pt seen upon request at approx 0400 this am. Pt reported drainage dripping on the floor from incision site at bedside. Drainage noted to be serosanguineous on the floor. Pt brought to the bed for further assessment of area. Old dressing removed. Large amount of serosanguineous drainage on old dressing. No drainage expressed from incision site upon assessment of area. Periwound warm, dry, and intact with good color. Skin appears well approximated. Dr. Davila called to bedside for further evaluation. Dressing replaced. Elevated BP at time of assessment. Dr. Davila aware of BP. Okay to repeat, per provider. No further concerns at this time.

## 2025-04-22 NOTE — ANESTHESIA POSTPROCEDURE EVALUATION
Post-Op Assessment Note    CV Status:  Stable    Pain management: adequate       Mental Status:  Alert and awake   Hydration Status:  Euvolemic   PONV Controlled:  Controlled   Airway Patency:  Patent     Post Op Vitals Reviewed: Yes    No anethesia notable event occurred.    Staff: Anesthesiologist           Last Filed PACU Vitals:  Vitals Value Taken Time   Temp 98.2 °F (36.8 °C) 04/21/25 0700   Pulse 77 04/21/25 0831   /85 04/21/25 0830   Resp 18 04/21/25 0830   SpO2 94 % 04/21/25 0831   Vitals shown include unfiled device data.    Modified Kalia:     Vitals Value Taken Time   Activity 2 04/21/25 0830   Respiration 2 04/21/25 0830   Circulation 2 04/21/25 0830   Consciousness 2 04/21/25 0830   Oxygen Saturation 2 04/21/25 0830     Modified Kalia Score: 10

## 2025-04-22 NOTE — QUICK NOTE
Assess patient's incision at bedside secondary to nursing request.  On exam incision is clean dry intact with no bleeding or oozing noted.  On examination of patient's bandage there is a substantial amount of serosanguineous output.   Pressure dressing replaced and will continue to monitor.     Arlet Davila MD  OBGYN PGY-3  4/22/2025 4:37 AM

## 2025-04-22 NOTE — CASE MANAGEMENT
Case Management Progress Note    Patient name Teri Mckinney  Location /-01 MRN [id number]  : 1985 Date 2025       LOS (days): 4  Geometric Mean LOS (GMLOS) (days):   Days to GMLOS:        OBJECTIVE:        Current admission status: Inpatient  Preferred Pharmacy:   [facility] - [place], [place] - [address]  [address]  [place] [address]  Phone: [phone] Fax: [phone]    [id number] - [place], [place] - [address]  [address]  [place] [address]  Phone: [phone] Fax: [phone]    Primary Care Provider: No primary care provider on file.    Primary Insurance: CAPITAL  Secondary Insurance:     PROGRESS NOTE:      CM met with MOB to introduce CM services, complete assessment, and provide CM contact info.    MOB had Mother present and verbalized agreement with personal interview with them present.    MOB reported the following:    Assessment:  Consult reason: NICU Admission  Gestational Age at Birth: 34 Weeks + 4 Days  MOB Name (& age if teen):   Teri Mckinney  FOB Name (& age if teen MOB): Shell Pickard    Other Legal Guardian(s) for Baby: n/a     Other Children:   First Baby   Housing Plan/Lives with:   MOB FOB   Insurance Coverage/Plan for Baby: MOB verbalizes that they will contact their insurance to add baby ASAP.   Support System: Family, Friends, and Spouse/Significant Other  Care Items: Car Seat, Crib/Bassinet (Safe Sleep Space), Diapers/Wipes, and Clothing  Method of Feeding: Breast Feeding  Breast Pump: Breast pump obtained prior to admission  Government Assistance Programs: None   Arrangements: MOB and Day Care  Current Employment/Schooling: MOB employed Full Time  Mental Health History and/or Treatment:   MOB reported history of depression, no current needs  Substance Use History and/or Treatment: None reported      Urine Drug Screen Results: Not Applicable  Children & Youth History: None  Current Legal Issues:  N/A  Domestic/Intimate Partner Violence History: Denies.  NICU Resources: NICU Packet Provided    Discharge Plan:  Pediatrician:   JOSE Chung   Prenatal/ Care: GUERA   Follow-Up Appointments Needed/Scheduled: TBD  Medications/DME/Other Referrals: TBD  Transportation Plan: MOB has a vehicle and FOB has a vehicle    Follow-Up Needed from Care Management:       CM provided NICU packet, no current needs. CM will follow through baby's discharge.

## 2025-04-22 NOTE — ASSESSMENT & PLAN NOTE
Status post primary low-transverse  2025 and received neuraxial Duramorph.    Neuraxial Duramorph:     Methadone and Duramorph Intra-procedure Administrations (last 48 hours) Showing orders from other encounters Morphine (Duramorph) may show additional administrations that are not neuraxial. Review route and comments.      Date/Time Action Medication Dose    25 0606 Given    morphine (PF) (DURAMORPH) injection 3 mg           Monitoring to continue for 24 hours post Duramorph administration:  Monitor pain, HR, BP, respirations, level of sedation and oxygen levels as ordered  Continuous pulse oximetry (or capnography) for 24 hours.  Notify Anesthesia/Acute Pain Services for the following:  Persistent pruritis, persistent nausea, or if respiratory rate is less than or equal to 10 breaths per minute or if pain is unrelieved or if patient is excessively sedated or O2 Sat less than 90% or EtCO2 outside the parameters of Respiratory Therapy Capnography/EtCO2 policy  No narcotics / sedatives / sleeping agents not ordered by Anesthesia/Acute Pain Service for 24 hours post duramorph

## 2025-04-23 PROCEDURE — 99024 POSTOP FOLLOW-UP VISIT: CPT | Performed by: STUDENT IN AN ORGANIZED HEALTH CARE EDUCATION/TRAINING PROGRAM

## 2025-04-23 RX ADMIN — ENOXAPARIN SODIUM 40 MG: 40 INJECTION SUBCUTANEOUS at 09:37

## 2025-04-23 RX ADMIN — ACETAMINOPHEN 975 MG: 325 TABLET, FILM COATED ORAL at 04:10

## 2025-04-23 RX ADMIN — NIFEDIPINE 60 MG: 60 TABLET, EXTENDED RELEASE ORAL at 09:37

## 2025-04-23 RX ADMIN — CEPHALEXIN 500 MG: 500 CAPSULE ORAL at 04:10

## 2025-04-23 RX ADMIN — ACETAMINOPHEN 975 MG: 325 TABLET, FILM COATED ORAL at 14:50

## 2025-04-23 RX ADMIN — Medication 2.5 MG: at 11:46

## 2025-04-23 RX ADMIN — DOCUSATE SODIUM 100 MG: 100 CAPSULE, LIQUID FILLED ORAL at 09:37

## 2025-04-23 RX ADMIN — NIFEDIPINE 60 MG: 60 TABLET, EXTENDED RELEASE ORAL at 21:16

## 2025-04-23 RX ADMIN — ACETAMINOPHEN 975 MG: 325 TABLET, FILM COATED ORAL at 09:37

## 2025-04-23 RX ADMIN — CEPHALEXIN 500 MG: 500 CAPSULE ORAL at 09:37

## 2025-04-23 RX ADMIN — ENOXAPARIN SODIUM 40 MG: 40 INJECTION SUBCUTANEOUS at 21:16

## 2025-04-23 RX ADMIN — ACETAMINOPHEN 975 MG: 325 TABLET, FILM COATED ORAL at 21:16

## 2025-04-23 RX ADMIN — FUROSEMIDE 20 MG: 20 TABLET ORAL at 09:37

## 2025-04-23 RX ADMIN — Medication 2.5 MG: at 16:55

## 2025-04-23 NOTE — ASSESSMENT & PLAN NOTE
Upon further chart review, patient with chronic hypertension with multiple elevated blood pressures from annual exams  Met criteria for superimposed preeclampsia with severe features for sustained severe range blood pressures  Patient s/p 2 doses of betamethasone -  PreE w SF (SRBP): CBCwnl, CMP notable for AST/AL, CrT; p:c 7.6; s/p 24hr PP mag  - AST/ALT: 78/103 () -> 137/206 ( PM) -> 109/184 () -> 95/181 () -> 64/146 () -> 51/121 ()  - Cr: 0.9 () -> 1.05 () -> 1 () -> 1.05 () -> 1.06 () -> 1.01 () -> 0.96 ()  Current regimen: Procardia XL 60mg BID, lasix protocol  - : 20/40 Lab, Procardia 30qD  - : Procardia 60qD, IV Lab   - : Procardia 60 AM + 30 PM, IV Lab 20 (not given)  - : Procardia 60mg BID, lasix protocol    Monitor blood pressures and signs/symptoms of worsening preeclampsia      Systolic (12hrs), Av , Min:132 , Max:149   Diastolic (12hrs), Av, Min:75, Max:85

## 2025-04-23 NOTE — ASSESSMENT & PLAN NOTE
-  mL, Hgb 10.1 --> 10.5  - Pain well controlled with oral analgesics  - Voiding spontaneously, passed VT  - Tolerating PO fluids and solids  - Ambulating without difficulty  - DVT ppx with Lovenox 40mg BID, SCDs  - Anticipate discharge POD#4

## 2025-04-23 NOTE — PLAN OF CARE
Problem: PAIN - ADULT  Goal: Verbalizes/displays adequate comfort level or baseline comfort level  Description: Interventions:- Encourage patient to monitor pain and request assistance- Assess pain using appropriate pain scale- Administer analgesics based on type and severity of pain and evaluate response- Implement non-pharmacological measures as appropriate and evaluate response- Consider cultural and social influences on pain and pain management- Notify physician/advanced practitioner if interventions unsuccessful or patient reports new pain  Outcome: Progressing     Problem: INFECTION - ADULT  Goal: Absence or prevention of progression during hospitalization  Description: INTERVENTIONS:- Assess and monitor for signs and symptoms of infection- Monitor lab/diagnostic results- Monitor all insertion sites, i.e. indwelling lines, tubes, and drains- Monitor endotracheal if appropriate and nasal secretions for changes in amount and color- [place] appropriate cooling/warming therapies per order- Administer medications as ordered- Instruct and encourage patient and family to use good hand hygiene technique- Identify and instruct in appropriate isolation precautions for identified infection/condition  Outcome: Progressing  Goal: Absence of fever/infection during neutropenic period  Description: INTERVENTIONS:- Monitor WBC  Outcome: Progressing     Problem: SAFETY ADULT  Goal: Patient will remain free of falls  Description: INTERVENTIONS:- Educate patient/family on patient safety including physical limitations- Instruct patient to call for assistance with activity - Consult OT/PT to assist with strengthening/mobility - Keep Call bell within reach- Keep bed low and locked with side rails adjusted as appropriate- Keep care items and personal belongings within reach- Initiate and maintain comfort rounds  Outcome: Progressing  Goal: Maintain or return to baseline ADL function  Description: INTERVENTIONS:-  Assess patient's  ability to carry out ADLs; assess patient's baseline for ADL function and identify physical deficits which impact ability to perform ADLs (bathing, care of mouth/teeth, toileting, grooming, dressing, etc.)- Assess/evaluate cause of self-care deficits - Assess range of motion- Assess patient's mobility; develop plan if impaired- Assess patient's need for assistive devices and provide as appropriate- Encourage maximum independence but intervene and supervise when necessary- Involve family in performance of ADLs- Assess for home care needs following discharge - Consider OT consult to assist with ADL evaluation and planning for discharge- Provide patient education as appropriate  Outcome: Progressing  Goal: Maintains/Returns to pre admission functional level  Description: INTERVENTIONS:- Perform AM-PAC 6 Click Basic Mobility/ Daily Activity assessment daily.- Set and communicate daily mobility goal to care team and patient/family/caregiver. - Collaborate with rehabilitation services on mobility goals if consulted  Outcome: Progressing     Problem: Knowledge Deficit  Goal: Patient/family/caregiver demonstrates understanding of disease process, treatment plan, medications, and discharge instructions  Description: Complete learning assessment and assess knowledge base.Interventions:- Provide teaching at level of understanding- Provide teaching via preferred learning methods  Outcome: Progressing  Goal: Verbalizes understanding of labor plan  Description: Assess patient/family/caregiver's baseline knowledge level and ability to understand information.  Provide education via patient/family/caregiver's preferred learning method at appropriate level of understanding. 1. Provide teaching at level of understanding.2. Provide teaching via preferred learning method(s).  Outcome: Progressing     Problem: DISCHARGE PLANNING  Goal: Discharge to home or other facility with appropriate resources  Description: INTERVENTIONS:- Identify  barriers to discharge w/patient and caregiver- Arrange for needed discharge resources and transportation as appropriate- Identify discharge learning needs (meds, wound care, etc.)- Arrange for interpretive services to assist at discharge as needed- Refer to Case Management Department for coordinating discharge planning if the patient needs post-hospital services based on physician/advanced practitioner order or complex needs related to functional status, cognitive ability, or social support system  Outcome: Progressing     Problem: Labor & Delivery  Goal: Manages discomfort  Description: Assess and monitor for signs and symptoms of discomfort.  Assess patient's pain level regularly and per hospital policy.  Administer medications as ordered. Support use of nonpharmacological methods to help control pain such as distraction, imagery, relaxation, and application of heat and cold.  Collaborate with interdisciplinary team and patient to determine appropriate pain management plan.1. Include patient in decisions related to comfort.2. Offer non-pharmacological pain management interventions.3. Report ineffective pain management to physician.  Outcome: Progressing  Goal: Patient vital signs are stable  Description: 1. Assess vital signs - vaginal delivery.  Outcome: Progressing     Problem: BIRTH - VAGINAL/ SECTION  Goal: Fetal and maternal status remain reassuring during the birth process  Description: INTERVENTIONS:- Monitor vital signs- Monitor fetal heart rate- Monitor uterine activity- Monitor labor progression (vaginal delivery)- DVT prophylaxis- Antibiotic prophylaxis  Outcome: Progressing  Goal: Emotionally satisfying birthing experience for mother/fetus  Description: Interventions:- Assess, plan, implement and evaluate the nursing care given to the patient in labor- Advocate the philosophy that each childbirth experience is a unique experience and support the family's chosen level of involvement and control  during the labor process - Actively participate in both the patient's and family's teaching of the birth process- Consider cultural, [place] and age-specific factors and plan care for the patient in labor  Outcome: Progressing

## 2025-04-23 NOTE — PLAN OF CARE
Problem: PAIN - ADULT  Goal: Verbalizes/displays adequate comfort level or baseline comfort level  Description: Interventions:- Encourage patient to monitor pain and request assistance- Assess pain using appropriate pain scale- Administer analgesics based on type and severity of pain and evaluate response- Implement non-pharmacological measures as appropriate and evaluate response- Consider cultural and social influences on pain and pain management- Notify physician/advanced practitioner if interventions unsuccessful or patient reports new pain  Outcome: Progressing     Problem: INFECTION - ADULT  Goal: Absence or prevention of progression during hospitalization  Description: INTERVENTIONS:- Assess and monitor for signs and symptoms of infection- Monitor lab/diagnostic results- Monitor all insertion sites, i.e. indwelling lines, tubes, and drains- Monitor endotracheal if appropriate and nasal secretions for changes in amount and color- [place] appropriate cooling/warming therapies per order- Administer medications as ordered- Instruct and encourage patient and family to use good hand hygiene technique- Identify and instruct in appropriate isolation precautions for identified infection/condition  Outcome: Progressing  Goal: Absence of fever/infection during neutropenic period  Description: INTERVENTIONS:- Monitor WBC  Outcome: Progressing     Problem: SAFETY ADULT  Goal: Patient will remain free of falls  Description: INTERVENTIONS:- Educate patient/family on patient safety including physical limitations- Instruct patient to call for assistance with activity - Consult OT/PT to assist with strengthening/mobility - Keep Call bell within reach- Keep bed low and locked with side rails adjusted as appropriate- Keep care items and personal belongings within reach- Initiate and maintain comfort rounds  Outcome: Progressing  Goal: Maintain or return to baseline ADL function  Description: INTERVENTIONS:-  Assess patient's  ability to carry out ADLs; assess patient's baseline for ADL function and identify physical deficits which impact ability to perform ADLs (bathing, care of mouth/teeth, toileting, grooming, dressing, etc.)- Assess/evaluate cause of self-care deficits - Assess range of motion- Assess patient's mobility; develop plan if impaired- Assess patient's need for assistive devices and provide as appropriate- Encourage maximum independence but intervene and supervise when necessary- Involve family in performance of ADLs- Assess for home care needs following discharge - Consider OT consult to assist with ADL evaluation and planning for discharge- Provide patient education as appropriate  Outcome: Progressing  Goal: Maintains/Returns to pre admission functional level  Description: INTERVENTIONS:- Perform AM-PAC 6 Click Basic Mobility/ Daily Activity assessment daily.- Set and communicate daily mobility goal to care team and patient/family/caregiver. - Collaborate with rehabilitation services on mobility goals if consulted  Outcome: Progressing     Problem: Knowledge Deficit  Goal: Patient/family/caregiver demonstrates understanding of disease process, treatment plan, medications, and discharge instructions  Description: Complete learning assessment and assess knowledge base.Interventions:- Provide teaching at level of understanding- Provide teaching via preferred learning methods  Outcome: Progressing  Goal: Verbalizes understanding of labor plan  Description: Assess patient/family/caregiver's baseline knowledge level and ability to understand information.  Provide education via patient/family/caregiver's preferred learning method at appropriate level of understanding. 1. Provide teaching at level of understanding.2. Provide teaching via preferred learning method(s).  Outcome: Progressing     Problem: DISCHARGE PLANNING  Goal: Discharge to home or other facility with appropriate resources  Description: INTERVENTIONS:- Identify  barriers to discharge w/patient and caregiver- Arrange for needed discharge resources and transportation as appropriate- Identify discharge learning needs (meds, wound care, etc.)- Arrange for interpretive services to assist at discharge as needed- Refer to Case Management Department for coordinating discharge planning if the patient needs post-hospital services based on physician/advanced practitioner order or complex needs related to functional status, cognitive ability, or social support system  Outcome: Progressing     Problem: Labor & Delivery  Goal: Manages discomfort  Description: Assess and monitor for signs and symptoms of discomfort.  Assess patient's pain level regularly and per hospital policy.  Administer medications as ordered. Support use of nonpharmacological methods to help control pain such as distraction, imagery, relaxation, and application of heat and cold.  Collaborate with interdisciplinary team and patient to determine appropriate pain management plan.1. Include patient in decisions related to comfort.2. Offer non-pharmacological pain management interventions.3. Report ineffective pain management to physician.  Outcome: Progressing  Goal: Patient vital signs are stable  Description: 1. Assess vital signs - vaginal delivery.  Outcome: Progressing     Problem: BIRTH - VAGINAL/ SECTION  Goal: Fetal and maternal status remain reassuring during the birth process  Description: INTERVENTIONS:- Monitor vital signs- Monitor fetal heart rate- Monitor uterine activity- Monitor labor progression (vaginal delivery)- DVT prophylaxis- Antibiotic prophylaxis  Outcome: Progressing  Goal: Emotionally satisfying birthing experience for mother/fetus  Description: Interventions:- Assess, plan, implement and evaluate the nursing care given to the patient in labor- Advocate the philosophy that each childbirth experience is a unique experience and support the family's chosen level of involvement and control  during the labor process - Actively participate in both the patient's and family's teaching of the birth process- Consider cultural, [place] and age-specific factors and plan care for the patient in labor  Outcome: Progressing

## 2025-04-23 NOTE — PROGRESS NOTES
Progress Note - OB/GYN   Name: Teri Mckinney 39 y.o. female I MRN: [id number]  Unit/Bed#: -01 I Date of Admission: 2025   Date of Service: 2025 I Hospital Day: 5     Assessment & Plan  Severe preeclampsia  Upon further chart review, patient with chronic hypertension with multiple elevated blood pressures from annual exams  Met criteria for superimposed preeclampsia with severe features for sustained severe range blood pressures  Patient s/p 2 doses of betamethasone -  PreE w SF (SRBP): CBCwnl, CMP notable for AST/AL, CrT; p:c 7.6; s/p 24hr PP mag  - AST/ALT: 78/103 () -> 137/206 ( PM) -> 109/184 () -> 95/181 () -> 64/146 () -> 51/121 ()  - Cr: 0.9 () -> 1.05 () -> 1 () -> 1.05 () -> 1.06 () -> 1.01 () -> 0.96 ()  Current regimen: Procardia XL 60mg BID, lasix protocol  - : 20/40 Lab, Procardia 30qD  - : Procardia 60qD, IV Lab   - : Procardia 60 AM + 30 PM, IV Lab 20 (not given)  - : Procardia 60mg BID, lasix protocol    Monitor blood pressures and signs/symptoms of worsening preeclampsia      Systolic (12hrs), Av , Min:132 , Max:149   Diastolic (12hrs), Av, Min:75, Max:85        Diet controlled gestational diabetes mellitus (GDM) in third trimester  2hr GTT at 6 weeks PP    AMA (advanced maternal age) primigravida 35+, third trimester    S/P primary low transverse   -  mL, Hgb 10.1 --> 10.5  - Pain well controlled with oral analgesics  - Voiding spontaneously, passed VT  - Tolerating PO fluids and solids  - Ambulating without difficulty  - DVT ppx with Lovenox 40mg BID, SCDs  - Anticipate discharge POD#4      Postoperative pain      OB Post-Partum Progress Note  Subjective   Post delivery. Patient is doing well. Lochia WNL. Pain well controlled. She denies headaches, vision changes, RUQ tenderness. She denies lightheadedness, dizziness, palpitations, or diaphoresis. Since yesterday morning, her  abdominal dressing has been replaced twice, with a new one replaced in the last hour. Her drainage from the incision has slowed down.         Pain: yes, cramping, improved with meds  Tolerating PO: yes  Voiding: yes  Flatus: yes  BM: no  Ambulating: no  Chest pain: no  Shortness of breath: no  Leg pain: no  Lochia: WNL    Objective :  Temp:  [97.6 °F (36.4 °C)-98.5 °F (36.9 °C)] 97.6 °F (36.4 °C)  HR:  [84-99] 84  BP: (120-155)/(66-88) 132/75  Resp:  [18-20] 18  SpO2:  [98 %-100 %] 100 %  O2 Device: None (Room air)    Physical Exam  Vitals reviewed.   Constitutional:       Appearance: Normal appearance.   HENT:      Head: Normocephalic and atraumatic.   Eyes:      Extraocular Movements: Extraocular movements intact.   Cardiovascular:      Rate and Rhythm: Normal rate and regular rhythm.      Pulses: Normal pulses.      Heart sounds: Normal heart sounds.   Pulmonary:      Effort: Pulmonary effort is normal. No respiratory distress.      Breath sounds: Normal breath sounds. No wheezing.   Abdominal:      General: Abdomen is flat. There is no distension.      Palpations: Abdomen is soft.      Tenderness: There is no abdominal tenderness.      Comments: Fundus firm and non-tender  Incision clean and dry, with very small opening in the middle of the incision  New abdominal dressing without saturation   Musculoskeletal:         General: Normal range of motion.      Cervical back: Normal range of motion.   Skin:     General: Skin is warm and dry.   Neurological:      General: No focal deficit present.      Mental Status: She is alert. Mental status is at baseline.   Psychiatric:         Mood and Affect: Mood normal.         Behavior: Behavior normal.           Lab Results: I have reviewed the following results:  Lab Results   Component Value Date    WBC 17.89 (H) 04/22/2025    HGB 9.9 (L) 04/22/2025    HCT 30.6 (L) 04/22/2025    MCV 94 04/22/2025     04/22/2025       Vinicio Mac MD  Obstetrics & Gynecology  PGY-1  4/23/2025  6:04 AM

## 2025-04-23 NOTE — NURSING NOTE
Pt assessed at approx. 2010 for this shift. Wound assessment noted to have moderately saturated gauze underneath ABD with yellow serous fluid. ABD also peeling off at the side. Spoke with Dr. Torres about possible replacement of dressing. Per provider, okay to change if wet or saturated. Old dressing removed and replaced with 4x4 gauze and ABD pad to incision site. No drainage able to be expressed from incision site. Periwound clean, dry, and intact.

## 2025-04-24 PROCEDURE — 99024 POSTOP FOLLOW-UP VISIT: CPT | Performed by: OBSTETRICS & GYNECOLOGY

## 2025-04-24 PROCEDURE — 88307 TISSUE EXAM BY PATHOLOGIST: CPT | Performed by: PATHOLOGY

## 2025-04-24 RX ORDER — LABETALOL 200 MG/1
200 TABLET, FILM COATED ORAL ONCE
Status: COMPLETED | OUTPATIENT
Start: 2025-04-24 | End: 2025-04-24

## 2025-04-24 RX ORDER — LABETALOL HYDROCHLORIDE 5 MG/ML
20 INJECTION, SOLUTION INTRAVENOUS ONCE
Status: DISCONTINUED | OUTPATIENT
Start: 2025-04-24 | End: 2025-04-25 | Stop reason: HOSPADM

## 2025-04-24 RX ORDER — ACETAMINOPHEN 325 MG/1
650 TABLET ORAL EVERY 6 HOURS PRN
Status: DISCONTINUED | OUTPATIENT
Start: 2025-04-24 | End: 2025-04-25 | Stop reason: HOSPADM

## 2025-04-24 RX ORDER — LABETALOL 200 MG/1
200 TABLET, FILM COATED ORAL EVERY 12 HOURS SCHEDULED
Status: DISCONTINUED | OUTPATIENT
Start: 2025-04-24 | End: 2025-04-25 | Stop reason: HOSPADM

## 2025-04-24 RX ADMIN — ENOXAPARIN SODIUM 40 MG: 40 INJECTION SUBCUTANEOUS at 21:21

## 2025-04-24 RX ADMIN — NIFEDIPINE 60 MG: 60 TABLET, EXTENDED RELEASE ORAL at 21:21

## 2025-04-24 RX ADMIN — Medication 2.5 MG: at 11:20

## 2025-04-24 RX ADMIN — LABETALOL HYDROCHLORIDE 200 MG: 200 TABLET, FILM COATED ORAL at 01:34

## 2025-04-24 RX ADMIN — OXYCODONE HYDROCHLORIDE 5 MG: 5 TABLET ORAL at 00:21

## 2025-04-24 RX ADMIN — DOCUSATE SODIUM 100 MG: 100 CAPSULE, LIQUID FILLED ORAL at 18:05

## 2025-04-24 RX ADMIN — ACETAMINOPHEN 975 MG: 325 TABLET, FILM COATED ORAL at 06:04

## 2025-04-24 RX ADMIN — ACETAMINOPHEN 650 MG: 325 TABLET, FILM COATED ORAL at 18:25

## 2025-04-24 RX ADMIN — ACETAMINOPHEN 975 MG: 325 TABLET, FILM COATED ORAL at 11:20

## 2025-04-24 RX ADMIN — LABETALOL HYDROCHLORIDE 200 MG: 200 TABLET, FILM COATED ORAL at 15:00

## 2025-04-24 RX ADMIN — DOCUSATE SODIUM 100 MG: 100 CAPSULE, LIQUID FILLED ORAL at 09:59

## 2025-04-24 RX ADMIN — ACETAMINOPHEN 650 MG: 325 TABLET, FILM COATED ORAL at 23:57

## 2025-04-24 RX ADMIN — OXYCODONE HYDROCHLORIDE 5 MG: 5 TABLET ORAL at 18:04

## 2025-04-24 RX ADMIN — FUROSEMIDE 20 MG: 20 TABLET ORAL at 09:59

## 2025-04-24 RX ADMIN — NIFEDIPINE 60 MG: 60 TABLET, EXTENDED RELEASE ORAL at 09:59

## 2025-04-24 RX ADMIN — ENOXAPARIN SODIUM 40 MG: 40 INJECTION SUBCUTANEOUS at 09:59

## 2025-04-24 NOTE — ASSESSMENT & PLAN NOTE
Upon further chart review, patient with chronic hypertension with multiple elevated blood pressures from annual exams  Met criteria for superimposed preeclampsia with severe features for sustained severe range blood pressures  Patient s/p 2 doses of betamethasone -  PreE w SF (SRBP): CBCwnl, CMP notable for AST/AL, CrT; p:c 7.6; s/p 24hr PP mag  - AST/ALT: 78/103 () -> 137/206 ( PM) -> 109/184 () -> 95/181 () -> 64/146 () -> 51/121 ()  - Cr: 0.9 () -> 1.05 () -> 1 () -> 1.05 () -> 1.06 () -> 1.01 () -> 0.96 ()  Current regimen: Procardia XL 60mg BID, lasix protocol  - : 20/40 Lab, Procardia 30qD  - : Procardia 60qD, IV Lab   - : Procardia 60 AM + 30 PM, IV Lab 20 (not given)  - : Procardia 60mg BID, lasix protocol  - : SSRBP noted, lab 20 held, instead labetalol 200mg BID given     Monitor blood pressures and signs/symptoms of worsening preeclampsia      Systolic (12hrs), Av , Min:120 , Max:167   Diastolic (12hrs), Av, Min:70, Max:98

## 2025-04-24 NOTE — PLAN OF CARE
Problem: PAIN - ADULT  Goal: Verbalizes/displays adequate comfort level or baseline comfort level  Description: Interventions:- Encourage patient to monitor pain and request assistance- Assess pain using appropriate pain scale- Administer analgesics based on type and severity of pain and evaluate response- Implement non-pharmacological measures as appropriate and evaluate response- Consider cultural and social influences on pain and pain management- Notify physician/advanced practitioner if interventions unsuccessful or patient reports new pain  Outcome: Progressing     Problem: INFECTION - ADULT  Goal: Absence or prevention of progression during hospitalization  Description: INTERVENTIONS:- Assess and monitor for signs and symptoms of infection- Monitor lab/diagnostic results- Monitor all insertion sites, i.e. indwelling lines, tubes, and drains- Monitor endotracheal if appropriate and nasal secretions for changes in amount and color- [place] appropriate cooling/warming therapies per order- Administer medications as ordered- Instruct and encourage patient and family to use good hand hygiene technique- Identify and instruct in appropriate isolation precautions for identified infection/condition  Outcome: Progressing  Goal: Absence of fever/infection during neutropenic period  Description: INTERVENTIONS:- Monitor WBC  Outcome: Progressing     Problem: SAFETY ADULT  Goal: Patient will remain free of falls  Description: INTERVENTIONS:- Educate patient/family on patient safety including physical limitations- Instruct patient to call for assistance with activity - Consult OT/PT to assist with strengthening/mobility - Keep Call bell within reach- Keep bed low and locked with side rails adjusted as appropriate- Keep care items and personal belongings within reach- Initiate and maintain comfort rounds  Outcome: Progressing  Goal: Maintain or return to baseline ADL function  Description: INTERVENTIONS:-  Assess patient's  ability to carry out ADLs; assess patient's baseline for ADL function and identify physical deficits which impact ability to perform ADLs (bathing, care of mouth/teeth, toileting, grooming, dressing, etc.)- Assess/evaluate cause of self-care deficits - Assess range of motion- Assess patient's mobility; develop plan if impaired- Assess patient's need for assistive devices and provide as appropriate- Encourage maximum independence but intervene and supervise when necessary- Involve family in performance of ADLs- Assess for home care needs following discharge - Consider OT consult to assist with ADL evaluation and planning for discharge- Provide patient education as appropriate  Outcome: Progressing  Goal: Maintains/Returns to pre admission functional level  Description: INTERVENTIONS:- Perform AM-PAC 6 Click Basic Mobility/ Daily Activity assessment daily.- Set and communicate daily mobility goal to care team and patient/family/caregiver. - Collaborate with rehabilitation services on mobility goals if consulted  Outcome: Progressing     Problem: Knowledge Deficit  Goal: Patient/family/caregiver demonstrates understanding of disease process, treatment plan, medications, and discharge instructions  Description: Complete learning assessment and assess knowledge base.Interventions:- Provide teaching at level of understanding- Provide teaching via preferred learning methods  Outcome: Progressing  Goal: Verbalizes understanding of labor plan  Description: Assess patient/family/caregiver's baseline knowledge level and ability to understand information.  Provide education via patient/family/caregiver's preferred learning method at appropriate level of understanding. 1. Provide teaching at level of understanding.2. Provide teaching via preferred learning method(s).  Outcome: Progressing     Problem: DISCHARGE PLANNING  Goal: Discharge to home or other facility with appropriate resources  Description: INTERVENTIONS:- Identify  barriers to discharge w/patient and caregiver- Arrange for needed discharge resources and transportation as appropriate- Identify discharge learning needs (meds, wound care, etc.)- Arrange for interpretive services to assist at discharge as needed- Refer to Case Management Department for coordinating discharge planning if the patient needs post-hospital services based on physician/advanced practitioner order or complex needs related to functional status, cognitive ability, or social support system  Outcome: Progressing     Problem: Labor & Delivery  Goal: Manages discomfort  Description: Assess and monitor for signs and symptoms of discomfort.  Assess patient's pain level regularly and per hospital policy.  Administer medications as ordered. Support use of nonpharmacological methods to help control pain such as distraction, imagery, relaxation, and application of heat and cold.  Collaborate with interdisciplinary team and patient to determine appropriate pain management plan.1. Include patient in decisions related to comfort.2. Offer non-pharmacological pain management interventions.3. Report ineffective pain management to physician.  Outcome: Progressing  Goal: Patient vital signs are stable  Description: 1. Assess vital signs - vaginal delivery.  Outcome: Progressing     Problem: BIRTH - VAGINAL/ SECTION  Goal: Fetal and maternal status remain reassuring during the birth process  Description: INTERVENTIONS:- Monitor vital signs- Monitor fetal heart rate- Monitor uterine activity- Monitor labor progression (vaginal delivery)- DVT prophylaxis- Antibiotic prophylaxis  Outcome: Progressing  Goal: Emotionally satisfying birthing experience for mother/fetus  Description: Interventions:- Assess, plan, implement and evaluate the nursing care given to the patient in labor- Advocate the philosophy that each childbirth experience is a unique experience and support the family's chosen level of involvement and control  during the labor process - Actively participate in both the patient's and family's teaching of the birth process- Consider cultural, Spiritism and age-specific factors and plan care for the patient in labor  Outcome: Progressing

## 2025-04-24 NOTE — PROGRESS NOTES
Progress Note - OB/GYN   Name: Teri Mckinney 39 y.o. female I MRN: [id number]  Unit/Bed#: -01 I Date of Admission: 2025   Date of Service: 2025 I Hospital Day: 6     Assessment & Plan  Severe preeclampsia  Upon further chart review, patient with chronic hypertension with multiple elevated blood pressures from annual exams  Met criteria for superimposed preeclampsia with severe features for sustained severe range blood pressures  Patient s/p 2 doses of betamethasone -  PreE w SF (SRBP): CBCwnl, CMP notable for AST/AL, CrT; p:c 7.6; s/p 24hr PP mag  - AST/ALT: 78/103 () -> 137/206 ( PM) -> 109/184 () -> 95/181 () -> 64/146 () -> 51/121 ()  - Cr: 0.9 () -> 1.05 () -> 1 () -> 1.05 () -> 1.06 () -> 1.01 () -> 0.96 ()  Current regimen: Procardia XL 60mg BID, lasix protocol  - : 20/40 Lab, Procardia 30qD  - : Procardia 60qD, IV Lab   - : Procardia 60 AM + 30 PM, IV Lab 20 (not given)  - : Procardia 60mg BID, lasix protocol  - : SSRBP noted, lab 20 held, instead labetalol 200mg BID given     Monitor blood pressures and signs/symptoms of worsening preeclampsia      Systolic (12hrs), Av , Min:120 , Max:167   Diastolic (12hrs), Av, Min:70, Max:98        Diet controlled gestational diabetes mellitus (GDM) in third trimester  2hr GTT at 6 weeks PP    AMA (advanced maternal age) primigravida 35+, third trimester    S/P primary low transverse   -  mL, Hgb 10.1 --> 10.5  - Pain well controlled with oral analgesics  - Voiding spontaneously, passed VT  - Tolerating PO fluids and solids  - Ambulating without difficulty  - DVT ppx with Lovenox 40mg BID, SCDs  - Anticipate discharge POD#4      Postoperative pain      OB Post-Partum Progress Note  Subjective   Post delivery. Patient is doing well. Lochia WNL. Pain well controlled. She denies headaches, vision changes, RUQ tenderness.       Pain: yes, cramping,  improved with meds  Tolerating PO: yes  Voiding: yes  Flatus: yes  BM: no  Ambulating: no  Chest pain: no  Shortness of breath: no  Leg pain: no  Lochia: WNL    Objective :  Temp:  [97.6 °F (36.4 °C)-98.5 °F (36.9 °C)] 97.6 °F (36.4 °C)  HR:  [74-99] 74  BP: (120-167)/(70-98) 120/70  Resp:  [18-20] 18  SpO2:  [98 %-100 %] 98 %  O2 Device: None (Room air)    Physical Exam  Vitals reviewed.   Constitutional:       Appearance: Normal appearance.   HENT:      Head: Normocephalic and atraumatic.   Eyes:      Extraocular Movements: Extraocular movements intact.   Cardiovascular:      Rate and Rhythm: Normal rate and regular rhythm.      Pulses: Normal pulses.      Heart sounds: Normal heart sounds.   Pulmonary:      Effort: Pulmonary effort is normal. No respiratory distress.      Breath sounds: Normal breath sounds. No wheezing.   Abdominal:      General: Abdomen is flat. There is no distension.      Palpations: Abdomen is soft.      Tenderness: There is no abdominal tenderness.      Comments: Fundus firm and non-tender  Incision clean and dry, with very small opening in the middle of the incision     Musculoskeletal:         General: Normal range of motion.      Cervical back: Normal range of motion.   Skin:     General: Skin is warm and dry.   Neurological:      General: No focal deficit present.      Mental Status: She is alert. Mental status is at baseline.   Psychiatric:         Mood and Affect: Mood normal.         Behavior: Behavior normal.           Lab Results: I have reviewed the following results:  Lab Results   Component Value Date    WBC 17.89 (H) 04/22/2025    HGB 9.9 (L) 04/22/2025    HCT 30.6 (L) 04/22/2025    MCV 94 04/22/2025     04/22/2025       Vinicio Mac MD  Obstetrics & Gynecology PGY-1  4/24/2025  6:42 AM

## 2025-04-24 NOTE — PLAN OF CARE
Problem: PAIN - ADULT  Goal: Verbalizes/displays adequate comfort level or baseline comfort level  Description: Interventions:- Encourage patient to monitor pain and request assistance- Assess pain using appropriate pain scale- Administer analgesics based on type and severity of pain and evaluate response- Implement non-pharmacological measures as appropriate and evaluate response- Consider cultural and social influences on pain and pain management- Notify physician/advanced practitioner if interventions unsuccessful or patient reports new pain  Outcome: Progressing     Problem: INFECTION - ADULT  Goal: Absence or prevention of progression during hospitalization  Description: INTERVENTIONS:- Assess and monitor for signs and symptoms of infection- Monitor lab/diagnostic results- Monitor all insertion sites, i.e. indwelling lines, tubes, and drains- Monitor endotracheal if appropriate and nasal secretions for changes in amount and color- [place] appropriate cooling/warming therapies per order- Administer medications as ordered- Instruct and encourage patient and family to use good hand hygiene technique- Identify and instruct in appropriate isolation precautions for identified infection/condition  Outcome: Progressing  Goal: Absence of fever/infection during neutropenic period  Description: INTERVENTIONS:- Monitor WBC  Outcome: Progressing     Problem: SAFETY ADULT  Goal: Patient will remain free of falls  Description: INTERVENTIONS:- Educate patient/family on patient safety including physical limitations- Instruct patient to call for assistance with activity - Consult OT/PT to assist with strengthening/mobility - Keep Call bell within reach- Keep bed low and locked with side rails adjusted as appropriate- Keep care items and personal belongings within reach- Initiate and maintain comfort rounds  Outcome: Progressing  Goal: Maintain or return to baseline ADL function  Description: INTERVENTIONS:-  Assess patient's  ability to carry out ADLs; assess patient's baseline for ADL function and identify physical deficits which impact ability to perform ADLs (bathing, care of mouth/teeth, toileting, grooming, dressing, etc.)- Assess/evaluate cause of self-care deficits - Assess range of motion- Assess patient's mobility; develop plan if impaired- Assess patient's need for assistive devices and provide as appropriate- Encourage maximum independence but intervene and supervise when necessary- Involve family in performance of ADLs- Assess for home care needs following discharge - Consider OT consult to assist with ADL evaluation and planning for discharge- Provide patient education as appropriate  Outcome: Progressing  Goal: Maintains/Returns to pre admission functional level  Description: INTERVENTIONS:- Perform AM-PAC 6 Click Basic Mobility/ Daily Activity assessment daily.- Set and communicate daily mobility goal to care team and patient/family/caregiver. - Collaborate with rehabilitation services on mobility goals if consulted  Outcome: Progressing     Problem: Knowledge Deficit  Goal: Patient/family/caregiver demonstrates understanding of disease process, treatment plan, medications, and discharge instructions  Description: Complete learning assessment and assess knowledge base.Interventions:- Provide teaching at level of understanding- Provide teaching via preferred learning methods  Outcome: Progressing  Goal: Verbalizes understanding of labor plan  Description: Assess patient/family/caregiver's baseline knowledge level and ability to understand information.  Provide education via patient/family/caregiver's preferred learning method at appropriate level of understanding. 1. Provide teaching at level of understanding.2. Provide teaching via preferred learning method(s).  Outcome: Progressing     Problem: DISCHARGE PLANNING  Goal: Discharge to home or other facility with appropriate resources  Description: INTERVENTIONS:- Identify  barriers to discharge w/patient and caregiver- Arrange for needed discharge resources and transportation as appropriate- Identify discharge learning needs (meds, wound care, etc.)- Arrange for interpretive services to assist at discharge as needed- Refer to Case Management Department for coordinating discharge planning if the patient needs post-hospital services based on physician/advanced practitioner order or complex needs related to functional status, cognitive ability, or social support system  Outcome: Progressing     Problem: Labor & Delivery  Goal: Manages discomfort  Description: Assess and monitor for signs and symptoms of discomfort.  Assess patient's pain level regularly and per hospital policy.  Administer medications as ordered. Support use of nonpharmacological methods to help control pain such as distraction, imagery, relaxation, and application of heat and cold.  Collaborate with interdisciplinary team and patient to determine appropriate pain management plan.1. Include patient in decisions related to comfort.2. Offer non-pharmacological pain management interventions.3. Report ineffective pain management to physician.  Outcome: Progressing  Goal: Patient vital signs are stable  Description: 1. Assess vital signs - vaginal delivery.  Outcome: Progressing     Problem: BIRTH - VAGINAL/ SECTION  Goal: Fetal and maternal status remain reassuring during the birth process  Description: INTERVENTIONS:- Monitor vital signs- Monitor fetal heart rate- Monitor uterine activity- Monitor labor progression (vaginal delivery)- DVT prophylaxis- Antibiotic prophylaxis  Outcome: Progressing  Goal: Emotionally satisfying birthing experience for mother/fetus  Description: Interventions:- Assess, plan, implement and evaluate the nursing care given to the patient in labor- Advocate the philosophy that each childbirth experience is a unique experience and support the family's chosen level of involvement and control  during the labor process - Actively participate in both the patient's and family's teaching of the birth process- Consider cultural, Methodist and age-specific factors and plan care for the patient in labor  Outcome: Progressing

## 2025-04-25 VITALS
TEMPERATURE: 98.1 F | SYSTOLIC BLOOD PRESSURE: 127 MMHG | RESPIRATION RATE: 18 BRPM | HEIGHT: 62 IN | WEIGHT: 269 LBS | DIASTOLIC BLOOD PRESSURE: 78 MMHG | BODY MASS INDEX: 49.5 KG/M2 | OXYGEN SATURATION: 97 % | HEART RATE: 97 BPM

## 2025-04-25 PROCEDURE — 99024 POSTOP FOLLOW-UP VISIT: CPT | Performed by: STUDENT IN AN ORGANIZED HEALTH CARE EDUCATION/TRAINING PROGRAM

## 2025-04-25 RX ORDER — LABETALOL 200 MG/1
200 TABLET, FILM COATED ORAL 2 TIMES DAILY
Qty: 60 TABLET | Refills: 0 | Status: SHIPPED | OUTPATIENT
Start: 2025-04-25 | End: 2025-05-25

## 2025-04-25 RX ORDER — OXYCODONE HYDROCHLORIDE 5 MG/1
5 TABLET ORAL EVERY 4 HOURS PRN
Qty: 3 TABLET | Refills: 0 | Status: SHIPPED | OUTPATIENT
Start: 2025-04-25 | End: 2025-05-05

## 2025-04-25 RX ORDER — ACETAMINOPHEN 325 MG/1
650 TABLET ORAL EVERY 6 HOURS PRN
Qty: 24 TABLET | Refills: 0 | Status: SHIPPED | OUTPATIENT
Start: 2025-04-25 | End: 2025-04-28

## 2025-04-25 RX ORDER — BENZOCAINE/MENTHOL 6 MG-10 MG
1 LOZENGE MUCOUS MEMBRANE DAILY PRN
Start: 2025-04-25

## 2025-04-25 RX ADMIN — LABETALOL HYDROCHLORIDE 200 MG: 200 TABLET, FILM COATED ORAL at 13:08

## 2025-04-25 RX ADMIN — ACETAMINOPHEN 650 MG: 325 TABLET, FILM COATED ORAL at 13:08

## 2025-04-25 RX ADMIN — DOCUSATE SODIUM 100 MG: 100 CAPSULE, LIQUID FILLED ORAL at 10:17

## 2025-04-25 RX ADMIN — ENOXAPARIN SODIUM 40 MG: 40 INJECTION SUBCUTANEOUS at 10:17

## 2025-04-25 RX ADMIN — ACETAMINOPHEN 650 MG: 325 TABLET, FILM COATED ORAL at 07:33

## 2025-04-25 RX ADMIN — NIFEDIPINE 60 MG: 60 TABLET, EXTENDED RELEASE ORAL at 10:17

## 2025-04-25 RX ADMIN — FUROSEMIDE 20 MG: 20 TABLET ORAL at 10:17

## 2025-04-25 RX ADMIN — LABETALOL HYDROCHLORIDE 200 MG: 200 TABLET, FILM COATED ORAL at 01:27

## 2025-04-25 NOTE — PLAN OF CARE
Problem: PAIN - ADULT  Goal: Verbalizes/displays adequate comfort level or baseline comfort level  Description: Interventions:- Encourage patient to monitor pain and request assistance- Assess pain using appropriate pain scale- Administer analgesics based on type and severity of pain and evaluate response- Implement non-pharmacological measures as appropriate and evaluate response- Consider cultural and social influences on pain and pain management- Notify physician/advanced practitioner if interventions unsuccessful or patient reports new pain  Outcome: Progressing     Problem: INFECTION - ADULT  Goal: Absence or prevention of progression during hospitalization  Description: INTERVENTIONS:- Assess and monitor for signs and symptoms of infection- Monitor lab/diagnostic results- Monitor all insertion sites, i.e. indwelling lines, tubes, and drains- Monitor endotracheal if appropriate and nasal secretions for changes in amount and color- [place] appropriate cooling/warming therapies per order- Administer medications as ordered- Instruct and encourage patient and family to use good hand hygiene technique- Identify and instruct in appropriate isolation precautions for identified infection/condition  Outcome: Progressing  Goal: Absence of fever/infection during neutropenic period  Description: INTERVENTIONS:- Monitor WBC  Outcome: Progressing     Problem: SAFETY ADULT  Goal: Patient will remain free of falls  Description: INTERVENTIONS:- Educate patient/family on patient safety including physical limitations- Instruct patient to call for assistance with activity - Consult OT/PT to assist with strengthening/mobility - Keep Call bell within reach- Keep bed low and locked with side rails adjusted as appropriate- Keep care items and personal belongings within reach- Initiate and maintain comfort rounds  Outcome: Progressing  Goal: Maintain or return to baseline ADL function  Description: INTERVENTIONS:-  Assess patient's  ability to carry out ADLs; assess patient's baseline for ADL function and identify physical deficits which impact ability to perform ADLs (bathing, care of mouth/teeth, toileting, grooming, dressing, etc.)- Assess/evaluate cause of self-care deficits - Assess range of motion- Assess patient's mobility; develop plan if impaired- Assess patient's need for assistive devices and provide as appropriate- Encourage maximum independence but intervene and supervise when necessary- Involve family in performance of ADLs- Assess for home care needs following discharge - Consider OT consult to assist with ADL evaluation and planning for discharge- Provide patient education as appropriate  Outcome: Progressing  Goal: Maintains/Returns to pre admission functional level  Description: INTERVENTIONS:- Perform AM-PAC 6 Click Basic Mobility/ Daily Activity assessment daily.- Set and communicate daily mobility goal to care team and patient/family/caregiver. - Collaborate with rehabilitation services on mobility goals if consulted  Outcome: Progressing     Problem: DISCHARGE PLANNING  Goal: Discharge to home or other facility with appropriate resources  Description: INTERVENTIONS:- Identify barriers to discharge w/patient and caregiver- Arrange for needed discharge resources and transportation as appropriate- Identify discharge learning needs (meds, wound care, etc.)- Arrange for interpretive services to assist at discharge as needed- Refer to Case Management Department for coordinating discharge planning if the patient needs post-hospital services based on physician/advanced practitioner order or complex needs related to functional status, cognitive ability, or social support system  Outcome: Progressing

## 2025-04-25 NOTE — CONSULTS
Met with parents to answer any questions. They are likely being discharged today.  Baby in NICU.    Reviewed electric pumping.  Taught and reviewed hand expression.  Encouraged mother to pump for about 15 minutes every 3 hours and take one, 4-5 hour break for sleep.    Spend 5-7 minutes after electric pumping doing hand expression. Mother was successful with this today.    She just purchased a wearable Mom Cozy breast pump and also has a double electric Medela at home.    She was made aware that the wearable pump may not be quite as effective as the double electric pump.    Encouraged her to put baby to breast when ever she is ready to. It is understood that it may take awhile for the baby to latch and to be patient with each other.    Encouraged them to follow up with [facility] for further support and guidance once the baby is ready to go home.

## 2025-04-25 NOTE — ASSESSMENT & PLAN NOTE
Upon further chart review, patient with chronic hypertension with multiple elevated blood pressures from annual exams  Met criteria for superimposed preeclampsia with severe features for sustained severe range blood pressures  Patient s/p 2 doses of betamethasone -  PreE w SF (SRBP): CBCwnl, CMP notable for AST/AL, CrT; p:c 7.6; s/p 24hr PP mag  - AST/ALT: 78/103 () -> 137/206 ( PM) -> 109/184 () -> 95/181 () -> 64/146 () -> 51/121 ()  - Cr: 0.9 () -> 1.05 () -> 1 () -> 1.05 () -> 1.06 () -> 1.01 () -> 0.96 ()  Current regimen: Procardia XL 60mg BID, labetalol 200mg BID, lasix protocol (day )  - : 20/40 Lab, Procardia 30qD  - : Procardia 60qD, IV Lab /  - : Procardia 60 AM + 30 PM, IV Lab 20 (not given)  - : Procardia 60mg BID, lasix protocol  - : SSRBP noted, lab 20 held, instead labetalol 200mg BID    Monitor blood pressures and signs/symptoms of worsening preeclampsia    Systolic (12hrs), Av , Min:133 , Max:135   Diastolic (12hrs), Av, Min:76, Max:84

## 2025-04-25 NOTE — PROGRESS NOTES
Progress Note - OB/GYN   Name: Teri Mckinney 39 y.o. female I MRN: [id number]  Unit/Bed#: -01 I Date of Admission: 2025   Date of Service: 2025 I Hospital Day: 7     Assessment & Plan  Severe preeclampsia  Upon further chart review, patient with chronic hypertension with multiple elevated blood pressures from annual exams  Met criteria for superimposed preeclampsia with severe features for sustained severe range blood pressures  Patient s/p 2 doses of betamethasone -  PreE w SF (SRBP): CBCwnl, CMP notable for AST/AL, CrT; p:c 7.6; s/p 24hr PP mag  - AST/ALT: 78/103 () -> 137/206 ( PM) -> 109/184 () -> 95/181 () -> 64/146 () -> 51/121 ()  - Cr: 0.9 () -> 1.05 () -> 1 () -> 1.05 () -> 1.06 () -> 1.01 () -> 0.96 ()  Current regimen: Procardia XL 60mg BID, labetalol 200mg BID, lasix protocol (day )  - : 20/40 Lab, Procardia 30qD  - : Procardia 60qD, IV Lab /  - : Procardia 60 AM + 30 PM, IV Lab 20 (not given)  - : Procardia 60mg BID, lasix protocol  - : SSRBP noted, lab 20 held, instead labetalol 200mg BID    Monitor blood pressures and signs/symptoms of worsening preeclampsia    Systolic (12hrs), Av , Min:133 , Max:135   Diastolic (12hrs), Av, Min:76, Max:84        Diet controlled gestational diabetes mellitus (GDM) in third trimester  2hr GTT at 6 weeks PP    AMA (advanced maternal age) primigravida 35+, third trimester    S/P primary low transverse   -  mL, Hgb 10.1 --> 10.5  - Pain well controlled with oral analgesics  - Voiding spontaneously, passed VT  - Tolerating PO fluids and solids  - Ambulating without difficulty  - DVT ppx with Lovenox 40mg BID, SCDs  - Anticipate discharge POD#4      Postoperative pain      OB Post-Partum Progress Note  Subjective   Post delivery. Patient is doing well. Lochia WNL. Pain well controlled. She denies headaches, vision changes, RUQ tenderness. Feels  ready for discharge today pending blood pressures.       Pain: yes, cramping, improved with meds  Tolerating PO: yes  Voiding: yes  Flatus: yes  BM: no  Ambulating: no  Chest pain: no  Shortness of breath: no  Leg pain: no  Lochia: WNL    Objective :  Temp:  [97.7 °F (36.5 °C)-98.2 °F (36.8 °C)] 97.7 °F (36.5 °C)  HR:  [76-89] 76  BP: (133-155)/() 133/76  Resp:  [16-18] 18  SpO2:  [97 %-98 %] 97 %  O2 Device: None (Room air)    Physical Exam  Vitals reviewed.   Constitutional:       Appearance: Normal appearance.   HENT:      Head: Normocephalic and atraumatic.   Eyes:      Extraocular Movements: Extraocular movements intact.   Cardiovascular:      Rate and Rhythm: Normal rate and regular rhythm.      Pulses: Normal pulses.      Heart sounds: Normal heart sounds.   Pulmonary:      Effort: Pulmonary effort is normal. No respiratory distress.      Breath sounds: Normal breath sounds. No wheezing.   Abdominal:      General: Abdomen is flat. There is no distension.      Palpations: Abdomen is soft.      Tenderness: There is no abdominal tenderness.      Comments: Fundus firm and non-tender  Incision clean and dry, with very small opening in the middle of the incision     Musculoskeletal:         General: Normal range of motion.      Cervical back: Normal range of motion.   Skin:     General: Skin is warm and dry.   Neurological:      General: No focal deficit present.      Mental Status: She is alert. Mental status is at baseline.   Psychiatric:         Mood and Affect: Mood normal.         Behavior: Behavior normal.           Lab Results: I have reviewed the following results:  Lab Results   Component Value Date    WBC 17.89 (H) 04/22/2025    HGB 9.9 (L) 04/22/2025    HCT 30.6 (L) 04/22/2025    MCV 94 04/22/2025     04/22/2025       Vinicio Mac MD  Obstetrics & Gynecology PGY-1  4/25/2025  7:41 AM

## 2025-04-28 VITALS
TEMPERATURE: 98.1 F | HEART RATE: 97 BPM | WEIGHT: 269 LBS | RESPIRATION RATE: 18 BRPM | DIASTOLIC BLOOD PRESSURE: 78 MMHG | HEIGHT: 62 IN | BODY MASS INDEX: 49.5 KG/M2 | OXYGEN SATURATION: 97 % | SYSTOLIC BLOOD PRESSURE: 127 MMHG

## 2025-04-28 NOTE — UTILIZATION REVIEW
"NOTIFICATION OF INPATIENT ADMISSION   MATERNITY/DELIVERY AUTHORIZATION REQUEST   SERVICING FACILITY:   [facility]  [facility] - L&D, [place], NICU  [address] [place] [address]  Tax ID: [id number]  NPI: [id number]   ATTENDING PROVIDER:  Attending Name and NPI#: Genoveva Kathleen Md [[id number]]  Address: [address] [place] [address]  Phone: [phone]   ADMISSION INFORMATION:  Place of Service: Inpatient [facility] ([place], [place])  Place of Service Code: 21  Inpatient Admission Date/Time: 25  3:00 AM  Discharge Date/Time: 2025  3:30 PM  Admitting Diagnosis Code/Description:  Pregnant [Z34.90]  Hypertension [I10]  Encounter for  delivery without indication [O82]     Mother: Teri Mckinney 1985 Estimated Date of Delivery: 25  Delivering clinician: Genoveva Kathleen   OB History          1    Para   1    Term   0       1    AB   0    Living   1         SAB   0    IAB   0    Ectopic   0    Multiple   0    Live Births   1           Obstetric Comments   Menarche - 9              Name & MRN:   Information for the patient's :  Faye, Baby Girl (Teri) [[id number]]    Delivery Information:  Sex: female  Delivered 2025 6:21 AM by , Low Transverse; Gestational Age: 34w4d     Measurements:  Weight: 4 lb 15.4 oz (2250 g);  Height: 17.32\"    APGAR 1 minute 5 minutes 10 minutes   Totals: 7 8       UTILIZATION REVIEW CONTACT:  Pepper Ricks Utilization   Network Utilization Review Department  Phone: [phone]  Fax [phone]  Email: [email or web address]  Contact for approvals/pending authorizations, clinical reviews, and discharge.     PHYSICIAN ADVISORY SERVICES:  Medical Necessity Denial & Qcug-by-Jeyt Review  Phone: [phone]  Fax: [phone]  Email: [email or web address]     DISCHARGE SUPPORT TEAM:  For Patients Discharge Needs & Updates  Phone: " [phone] opt. 2 Fax: [phone]  Email: [email or web address]    Initial Clinical Review    Admission: Date/Time/Statement:   Admission Orders (From admission, onward)       Ordered        04/18/25 0300  Inpatient Admission  Once                          Orders Placed This Encounter   Procedures    Inpatient Admission     Standing Status:   Standing     Number of Occurrences:   1     Level of Care:   Med Surg [16]     Estimated length of stay:   More than 2 Midnights     Certification:   I certify that inpatient services are medically necessary for this patient for a duration of greater than two midnights. See H&P and MD Progress Notes for additional information about the patient's course of treatment.     ED Arrival Information       Expected   4/17/2025     Arrival   4/17/2025 23:19    Acuity   -              Means of arrival   Walk-In    Escorted by   Family Member    Service   OB/GYN    Admission type   Emergency              Arrival complaint   34 wks preg/hypertension             Chief Complaint   Patient presents with    Hypertension     Pt reporting elevated blood pressures since 2230.         Initial Presentation: 39 y.o. female presented to L&D as inpatient for IOL in the setting of in the setting of preeclampsia with severe features. Diet controlled GDM. Plan continuous external monitoring, IVF,cytotec rodriguez balloon, IV pitocin if needed Epidural and IV MGSO 4 if needed and supportive care   SVE: 0/0/    @ 0906  Rodriguez balloon inserted   Contraction Frequency (minutes): 0  Cervical Dilation: Fingertip  Cervical Effacement: 0  Fetal Station: Ballotable  Baseline Rate (FHR): 130 bpm  Fetal Heart Rate (FHT): 130 BPM  FHR Category: 1    @ 1751  IV Insulin gtt  IV MGSO 4 gtt   Rodriguez expelled cytotex placed   Contraction Frequency (minutes): 30-4  Contraction Intensity: Mild/Moderate  Uterine Activity Characteristics: Occasional  Cervical Dilation: 2-3  Cervical Effacement: 30  Fetal Station:  Ballotable  Baseline Rate (FHR): 125 bpm  Fetal Heart Rate (FHT): 130 BPM  FHR Category: 1    @ 2037  Contraction Frequency (minutes): irregular  Contraction Intensity: Mild/Moderate  Uterine Activity Characteristics: Occasional  Cervical Dilation: 2-3  Cervical Effacement: 30  Fetal Station: Ballotable  Baseline Rate (FHR): 125 bpm  Fetal Heart Rate (FHT): 130 BPM  FHR Category: 1    04-19-25  @ 0031  S/p cytotec  Contraction Frequency (minutes): 0  Contraction Intensity: Mild  Uterine Activity Characteristics: Occasional (ctx appear picking up inverted)  Cervical Dilation: 2-3  Cervical Effacement: 30  Fetal Station: Ballotable  Baseline Rate (FHR): 120 bpm  Fetal Heart Rate (FHT): 127 BPM  FHR Category: 2     @ 0312  Contraction Frequency (minutes): irregular  Contraction Intensity: Mild  Uterine Activity Characteristics: Irritability  Cervical Dilation: 3  Cervical Effacement: 30  Fetal Station: Ballotable  Baseline Rate (FHR): 120 bpm  Fetal Heart Rate (FHT): 129 BPM  FHR Category: 1    @ 1621  Contraction Frequency (minutes): difficult to trace, pt laying on her side  Contraction Intensity: Mild  Uterine Activity Characteristics: Irritability  Cervical Dilation: 3-4  Cervical Effacement: 30  Fetal Station: Ballotable  Baseline Rate (FHR): 125 bpm  Fetal Heart Rate (FHT): 129 BPM  FHR Category: I    @ 2239  Dose (bryant-units/min) Oxytocin: 8 bryant-units/min  Contraction Frequency (minutes): 4-5  Contraction Intensity: Mild/Moderate  Uterine Activity Characteristics: Irritability  Cervical Dilation: 4  Cervical Effacement: 30  Fetal Station: Ballotable  Baseline Rate (FHR): 125 bpm  Fetal Heart Rate (FHT): 125 BPM  FHR Category: I    04-20-25  @ 0218  Dose (bryant-units/min) Oxytocin: 10 bryant-units/min  Contraction Frequency (minutes): 4  Contraction Intensity: Mild/Moderate  Uterine Activity Characteristics: Irritability  Cervical Dilation: 4  Cervical Effacement: 50  Fetal Station: Ballotable  Baseline Rate  (FHR): 120 bpm  Fetal Heart Rate (FHT): 122 BPM  FHR Category: I    @ 0956  S/p epidural     @ 1140  AROM clear fluid  Dose (bryant-units/min) Oxytocin: 24 bryant-units/min  Contraction Frequency (minutes): 4-5  Contraction Intensity: Mild/Moderate  Uterine Activity Characteristics: Irregular  Cervical Dilation: 4  Cervical Effacement: 50  Fetal Station: Ballotable  Baseline Rate (FHR): 125 bpm  Fetal Heart Rate (FHT): 128 BPM  FHR Category: 1    @ 1604  Dose (bryant-units/min) Oxytocin: 28 bryant-units/min  Contraction Frequency (minutes): difficult to trace  Contraction Intensity: Mild/Moderate  Uterine Activity Characteristics: Irregular  Cervical Dilation: 4  Cervical Effacement: 50  Fetal Station: Ballotable  Baseline Rate (FHR): 130 bpm  Fetal Heart Rate (FHT): 132 BPM  FHR Category: 1      @ 2113  Dose (bryant-units/min) Oxytocin: 30 bryant-units/min  Contraction Frequency (minutes): 3  Contraction Intensity: Mild/Moderate  Uterine Activity Characteristics: Regular  Cervical Dilation: 4  Cervical Effacement: 50  Fetal Station: -3  Baseline Rate (FHR): 130 bpm  Fetal Heart Rate (FHT): 144 BPM  FHR Category: I    24  @ 0030  Dose (bryant-units/min) Oxytocin: 0 bryant-units/min  Contraction Frequency (minutes): 3-4  Contraction Intensity: Mild/Moderate  Uterine Activity Characteristics: Regular  Cervical Dilation: 4  Cervical Effacement: 50  Fetal Station: -3  Baseline Rate (FHR):  (broken trace)  Fetal Heart Rate (FHT): 139 BPM  FHR Category: I    @ 0511  Dose (bryant-units/min) Oxytocin: 0 bryant-units/min  Contraction Frequency (minutes): 1 ctx  Contraction Intensity: Mild/Moderate  Uterine Activity Characteristics: Irregular  Cervical Dilation: 4  Cervical Effacement: 80  Fetal Station: -3  Baseline Rate (FHR): 125 bpm  Fetal Heart Rate (FHT): 132 BPM  FHR Category: 1      25    @ 34 w 4 d  FAILED IOL  FEMALE @ 0601  APGRA  7  8  WT 2250 grams   Baby taken to NICU       OB Treatment-Medication  Administration from 04/17/2025 2319 to 04/21/2025 1036         Date/Time Order Dose Route Action     04/18/2025 0123 labetalol (NORMODYNE) injection 20 mg 20 mg Intravenous Given     04/18/2025 0216 magnesium sulfate 4 g/100 mL IVPB (premix) 4 g 4 g Intravenous New Bag     04/18/2025 0236 magnesium sulfate 2 g/50 mL IVPB (premix) 2 g 2 g Intravenous New Bag     04/18/2025 0253 magnesium sulfate 20 g/500 mL infusion (premix) 2 g/hr Intravenous New Bag     04/18/2025 1213 magnesium sulfate 20 g/500 mL infusion (premix) 2 g/hr Intravenous New Bag     04/18/2025 1905 magnesium sulfate 20 g/500 mL infusion (premix) 2 g/hr Intravenous Handoff     04/18/2025 2029 magnesium sulfate 20 g/500 mL infusion (premix) 1 g/hr Intravenous Rate/Dose Change     04/19/2025 0018 magnesium sulfate 20 g/500 mL infusion (premix) 1 g/hr Intravenous New Bag     04/19/2025 1944 magnesium sulfate 20 g/500 mL infusion (premix) 1 g/hr Intravenous New Bag     04/19/2025 2000 magnesium sulfate 20 g/500 mL infusion (premix) 1 g/hr Intravenous Rate/Dose Verify     04/19/2025 2200 magnesium sulfate 20 g/500 mL infusion (premix) 1 g/hr Intravenous Rate/Dose Verify     04/20/2025 0000 magnesium sulfate 20 g/500 mL infusion (premix) 1 g/hr Intravenous Rate/Dose Verify     04/20/2025 0200 magnesium sulfate 20 g/500 mL infusion (premix) 1 g/hr Intravenous Rate/Dose Verify     04/20/2025 0400 magnesium sulfate 20 g/500 mL infusion (premix) 1 g/hr Intravenous Rate/Dose Verify     04/20/2025 0600 magnesium sulfate 20 g/500 mL infusion (premix) 1 g/hr Intravenous Rate/Dose Verify     04/20/2025 1601 magnesium sulfate 20 g/500 mL infusion (premix) 1 g/hr Intravenous New Bag     04/21/2025 0000 magnesium sulfate 20 g/500 mL infusion (premix) 1 g/hr Intravenous Rate/Dose Verify     04/21/2025 0200 magnesium sulfate 20 g/500 mL infusion (premix) 1 g/hr Intravenous Rate/Dose Verify     04/21/2025 0400 magnesium sulfate 20 g/500 mL infusion (premix) 1 g/hr  Intravenous Rate/Dose Verify     04/18/2025 0154 labetalol (NORMODYNE) injection 40 mg 40 mg Intravenous Given     04/18/2025 0215 lactated ringers infusion 50 mL/hr Intravenous New Bag     04/18/2025 0450 penicillin G (PFIZERPEN-G) in 0.9% sodium chloride 250 mL IVPB 5 Million Units 5 Million Units Intravenous New Bag     04/18/2025 0905 penicillin G (PFIZERPEN-G) in 0.9% sodium chloride 100 mL IVPB 2.5 Million Units 2.5 Million Units Intravenous New Bag     04/18/2025 1322 penicillin G (PFIZERPEN-G) in 0.9% sodium chloride 100 mL IVPB 2.5 Million Units 2.5 Million Units Intravenous New Bag     04/18/2025 1712 penicillin G (PFIZERPEN-G) in 0.9% sodium chloride 100 mL IVPB 2.5 Million Units 2.5 Million Units Intravenous New Bag     04/18/2025 2103 penicillin G (PFIZERPEN-G) in 0.9% sodium chloride 100 mL IVPB 2.5 Million Units 2.5 Million Units Intravenous New Bag     04/19/2025 0111 penicillin G (PFIZERPEN-G) in 0.9% sodium chloride 100 mL IVPB 2.5 Million Units 2.5 Million Units Intravenous New Bag     04/19/2025 0452 penicillin G (PFIZERPEN-G) in 0.9% sodium chloride 100 mL IVPB 2.5 Million Units 2.5 Million Units Intravenous New Bag     04/19/2025 0953 penicillin G (PFIZERPEN-G) in 0.9% sodium chloride 100 mL IVPB 2.5 Million Units 2.5 Million Units Intravenous New Bag     04/19/2025 1408 penicillin G (PFIZERPEN-G) in 0.9% sodium chloride 100 mL IVPB 2.5 Million Units 2.5 Million Units Intravenous New Bag     04/19/2025 1806 penicillin G (PFIZERPEN-G) in 0.9% sodium chloride 100 mL IVPB 2.5 Million Units 2.5 Million Units Intravenous New Bag     04/18/2025 0449 miSOPROStol (Cytotec) split tablet 50 mcg 50 mcg Oral Given     04/18/2025 0450 sodium chloride 0.9 % infusion 50 mL/hr Intravenous New Bag     04/18/2025 1905 sodium chloride 0.9 % infusion 50 mL/hr Intravenous Handoff     04/20/2025 1131 ondansetron (ZOFRAN) injection 4 mg 4 mg Intravenous Given     04/21/2025 0510 ondansetron (ZOFRAN) injection 4 mg 4  mg Intravenous Given     04/18/2025 0449 betamethasone acetate-betamethasone sodium phosphate (CELESTONE) injection 12 mg 12 mg Intramuscular Given     04/19/2025 0409 betamethasone acetate-betamethasone sodium phosphate (CELESTONE) injection 12 mg 12 mg Intramuscular Given     04/18/2025 0809 NIFEdipine (PROCARDIA XL) 24 hr tablet 30 mg 30 mg Oral Given     04/19/2025 0800 NIFEdipine (PROCARDIA XL) 24 hr tablet 30 mg 30 mg Oral Given     04/18/2025 1439 insulin regular (HumuLIN R,NovoLIN R) 1 Units/mL in sodium chloride 0.9 % 100 mL infusion 0.5 Units/hr Intravenous New Bag     04/18/2025 1905 insulin regular (HumuLIN R,NovoLIN R) 1 Units/mL in sodium chloride 0.9 % 100 mL infusion 0.5 Units/hr Intravenous Handoff     04/18/2025 2016 insulin regular (HumuLIN R,NovoLIN R) 1 Units/mL in sodium chloride 0.9 % 100 mL infusion 0.5 Units/hr Intravenous Restarted     04/18/2025 2108 insulin regular (HumuLIN R,NovoLIN R) 1 Units/mL in sodium chloride 0.9 % 100 mL infusion 0.5 Units/hr Intravenous Rate/Dose Change     04/18/2025 2204 insulin regular (HumuLIN R,NovoLIN R) 1 Units/mL in sodium chloride 0.9 % 100 mL infusion 0.5 Units/hr Intravenous Rate/Dose Change     04/18/2025 2305 insulin regular (HumuLIN R,NovoLIN R) 1 Units/mL in sodium chloride 0.9 % 100 mL infusion 0.5 Units/hr Intravenous Rate/Dose Change     04/19/2025 0008 insulin regular (HumuLIN R,NovoLIN R) 1 Units/mL in sodium chloride 0.9 % 100 mL infusion 0.5 Units/hr Intravenous Rate/Dose Change     04/19/2025 0109 insulin regular (HumuLIN R,NovoLIN R) 1 Units/mL in sodium chloride 0.9 % 100 mL infusion 0.5 Units/hr Intravenous Rate/Dose Change     04/19/2025 0208 insulin regular (HumuLIN R,NovoLIN R) 1 Units/mL in sodium chloride 0.9 % 100 mL infusion 0.5 Units/hr Intravenous Rate/Dose Change     04/19/2025 0301 insulin regular (HumuLIN R,NovoLIN R) 1 Units/mL in sodium chloride 0.9 % 100 mL infusion 0.5 Units/hr Intravenous Rate/Dose Change     04/19/2025  0359 insulin regular (HumuLIN R,NovoLIN R) 1 Units/mL in sodium chloride 0.9 % 100 mL infusion 0.5 Units/hr Intravenous Rate/Dose Change     04/19/2025 0500 insulin regular (HumuLIN R,NovoLIN R) 1 Units/mL in sodium chloride 0.9 % 100 mL infusion 0.5 Units/hr Intravenous Rate/Dose Change     04/19/2025 0609 insulin regular (HumuLIN R,NovoLIN R) 1 Units/mL in sodium chloride 0.9 % 100 mL infusion 0.5 Units/hr Intravenous Rate/Dose Change     04/19/2025 0849 insulin regular (HumuLIN R,NovoLIN R) 1 Units/mL in sodium chloride 0.9 % 100 mL infusion 1 Units/hr Intravenous Rate/Dose Change     04/19/2025 1412 insulin regular (HumuLIN R,NovoLIN R) 1 Units/mL in sodium chloride 0.9 % 100 mL infusion 0.5 Units/hr Intravenous Rate/Dose Change     04/19/2025 1458 insulin regular (HumuLIN R,NovoLIN R) 1 Units/mL in sodium chloride 0.9 % 100 mL infusion 1 Units/hr Intravenous Rate/Dose Change     04/19/2025 1609 insulin regular (HumuLIN R,NovoLIN R) 1 Units/mL in sodium chloride 0.9 % 100 mL infusion 0.5 Units/hr Intravenous Rate/Dose Change     04/19/2025 1706 insulin regular (HumuLIN R,NovoLIN R) 1 Units/mL in sodium chloride 0.9 % 100 mL infusion 1 Units/hr Intravenous Rate/Dose Change     04/19/2025 1804 insulin regular (HumuLIN R,NovoLIN R) 1 Units/mL in sodium chloride 0.9 % 100 mL infusion 0.5 Units/hr Intravenous Rate/Dose Change     04/20/2025 2029 insulin regular (HumuLIN R,NovoLIN R) 1 Units/mL in sodium chloride 0.9 % 100 mL infusion 0.5 Units/hr Intravenous New Bag     04/20/2025 2233 insulin regular (HumuLIN R,NovoLIN R) 1 Units/mL in sodium chloride 0.9 % 100 mL infusion 0.5 Units/hr Intravenous Restarted     04/18/2025 1425 dextrose 5 % and sodium chloride 0.9 % infusion 50 mL/hr Intravenous New Bag     04/18/2025 1905 dextrose 5 % and sodium chloride 0.9 % infusion 25 mL/hr Intravenous Handoff     04/18/2025 2018 dextrose 5 % and sodium chloride 0.9 % infusion 25 mL/hr Intravenous New Bag     04/18/2025 2042  dextrose 5 % and sodium chloride 0.9 % infusion 50 mL/hr Intravenous Rate/Dose Change     04/19/2025 1407 dextrose 5 % and sodium chloride 0.9 % infusion 50 mL/hr Intravenous New Bag     04/20/2025 0906 dextrose 5 % and sodium chloride 0.9 % infusion 50 mL/hr Intravenous New Bag     04/21/2025 0412 dextrose 5 % and sodium chloride 0.9 % infusion 50 mL/hr Intravenous New Bag     04/19/2025 0134 sodium chloride 0.9 % infusion 50 mL/hr Intravenous New Bag     04/19/2025 2311 sodium chloride 0.9 % infusion 50 mL/hr Intravenous New Bag     04/20/2025 0951 sodium chloride 0.9 % infusion 150 mL/hr Intravenous Rate/Dose Change     04/20/2025 1014 sodium chloride 0.9 % infusion 50 mL/hr Intravenous Rate/Dose Change     04/20/2025 1024 sodium chloride 0.9 % infusion 750 mL/hr Intravenous Rate/Dose Change     04/20/2025 1131 sodium chloride 0.9 % infusion 50 mL/hr Intravenous New Bag     04/21/2025 0402 sodium chloride 0.9 % infusion 50 mL/hr Intravenous New Bag     04/18/2025 2023 miSOPROStol (Cytotec) split tablet 25 mcg 25 mcg Vaginal Given     04/18/2025 2241 calcium carbonate (TUMS) chewable tablet 500 mg 500 mg Oral Given     04/19/2025 0225 miSOPROStol (Cytotec) split tablet 25 mcg 25 mcg Vaginal Given     04/19/2025 0251 labetalol (NORMODYNE) injection 20 mg 20 mg Intravenous Given     04/19/2025 0750 miSOPROStol (Cytotec) split tablet 25 mcg 25 mcg Vaginal Given     04/20/2025 0802 NIFEdipine (PROCARDIA XL) 24 hr tablet 60 mg 60 mg Oral Given     04/21/2025 0806 NIFEdipine (PROCARDIA XL) 24 hr tablet 60 mg 60 mg Oral Given     04/19/2025 1058 NIFEdipine (PROCARDIA XL) 24 hr tablet 30 mg 30 mg Oral Given     04/19/2025 1159 miSOPROStol (Cytotec) split tablet 25 mcg 25 mcg Vaginal Given     04/19/2025 1130 labetalol (NORMODYNE) injection 20 mg 20 mg Intravenous Given     04/19/2025 1800 oxytocin (PITOCIN) 30 Units in lactated ringers 500 mL infusion 2 bryant-units/min Intravenous New Bag     04/19/2025 1828 oxytocin  (PITOCIN) 30 Units in lactated ringers 500 mL infusion 4 bryant-units/min Intravenous Rate/Dose Change     04/19/2025 1944 oxytocin (PITOCIN) 30 Units in lactated ringers 500 mL infusion 6 bryant-units/min Intravenous Rate/Dose Change     04/19/2025 2043 oxytocin (PITOCIN) 30 Units in lactated ringers 500 mL infusion 8 bryant-units/min Intravenous Rate/Dose Change     04/19/2025 2314 oxytocin (PITOCIN) 30 Units in lactated ringers 500 mL infusion 10 bryant-units/min Intravenous Rate/Dose Change     04/20/2025 0339 oxytocin (PITOCIN) 30 Units in lactated ringers 500 mL infusion 12 bryant-units/min Intravenous Rate/Dose Change     04/20/2025 0408 oxytocin (PITOCIN) 30 Units in lactated ringers 500 mL infusion 14 bryant-units/min Intravenous Rate/Dose Change     04/20/2025 0802 oxytocin (PITOCIN) 30 Units in lactated ringers 500 mL infusion 16 bryant-units/min Intravenous Rate/Dose Change     04/20/2025 0856 oxytocin (PITOCIN) 30 Units in lactated ringers 500 mL infusion 18 bryant-units/min Intravenous Rate/Dose Change     04/20/2025 1014 oxytocin (PITOCIN) 30 Units in lactated ringers 500 mL infusion 20 bryant-units/min Intravenous Rate/Dose Change     04/20/2025 1035 oxytocin (PITOCIN) 30 Units in lactated ringers 500 mL infusion 22 bryant-units/min Intravenous Rate/Dose Change     04/20/2025 1138 oxytocin (PITOCIN) 30 Units in lactated ringers 500 mL infusion 24 bryant-units/min Intravenous Rate/Dose Change     04/20/2025 1300 oxytocin (PITOCIN) 30 Units in lactated ringers 500 mL infusion 26 bryant-units/min Intravenous Rate/Dose Change     04/20/2025 1400 oxytocin (PITOCIN) 30 Units in lactated ringers 500 mL infusion 28 bryant-units/min Intravenous Rate/Dose Change     04/20/2025 1630 oxytocin (PITOCIN) 30 Units in lactated ringers 500 mL infusion 30 bryant-units/min Intravenous Rate/Dose Change     04/20/2025 2011 oxytocin (PITOCIN) 30 Units in lactated ringers 500 mL infusion 30 bryant-units/min Intravenous New Bag      04/21/2025 0252 oxytocin (PITOCIN) 30 Units in lactated ringers 500 mL infusion 2 bryant-units/min Intravenous Restarted     04/21/2025 0322 oxytocin (PITOCIN) 30 Units in lactated ringers 500 mL infusion 4 bryant-units/min Intravenous Rate/Dose Change     04/21/2025 0355 oxytocin (PITOCIN) 30 Units in lactated ringers 500 mL infusion 6 bryant-units/min Intravenous Rate/Dose Change     04/21/2025 0415 oxytocin (PITOCIN) 30 Units in lactated ringers 500 mL infusion 8 bryant-units/min Intravenous Rate/Dose Change     04/21/2025 0452 oxytocin (PITOCIN) 30 Units in lactated ringers 500 mL infusion 10 bryant-units/min Intravenous Rate/Dose Change     04/20/2025 0027 calcium carbonate (TUMS) chewable tablet 1,000 mg 1,000 mg Oral Given     04/20/2025 1131 calcium carbonate (TUMS) chewable tablet 1,000 mg 1,000 mg Oral Given     04/21/2025 0258 calcium carbonate (TUMS) chewable tablet 1,000 mg 1,000 mg Oral Given     04/20/2025 1004 ropivacaine 0.2% PCEA -- Epidural New Bag     04/20/2025 1750 ropivacaine 0.2% PCEA -- Epidural New Bag     04/21/2025 0132 ropivacaine 0.2% PCEA -- Epidural New Bag     04/20/2025 1431 Famotidine (PF) (PEPCID) injection 20 mg 20 mg Intravenous Given     04/21/2025 0242 labetalol (NORMODYNE) injection 20 mg 20 mg Intravenous Given     04/20/2025 2323 NIFEdipine (PROCARDIA XL) 24 hr tablet 30 mg 30 mg Oral Given     04/21/2025 0540 ceFAZolin (ANCEF) IVPB (premix in dextrose) 2,000 mg 50 mL 1,000 mg Intravenous Given     04/21/2025 0655 ceFAZolin (ANCEF) IVPB (premix in dextrose) 2,000 mg 50 mL -- Intravenous Anesthesia Volume Adjustment     04/21/2025 0540 azithromycin (ZITHROMAX) 500 mg in sodium chloride 0.9% 250mL IVPB 500 mg 500 mg Intravenous Given     04/21/2025 0655 azithromycin (ZITHROMAX) 500 mg in sodium chloride 0.9% 250mL IVPB 500 mg -- Intravenous Anesthesia Volume Adjustment     04/21/2025 0938 acetaminophen (TYLENOL) tablet 650 mg 650 mg Oral Given     04/21/2025 0938 docusate sodium  (COLACE) capsule 100 mg 100 mg Oral Given     04/21/2025 0715 oxytocin (PITOCIN) 30 Units in lactated ringers 500 mL infusion 62.5 bryant-units/min Intravenous New Bag     04/21/2025 0938 metroNIDAZOLE (FLAGYL) tablet 500 mg 500 mg Oral Given            Scheduled Medications:  No current facility-administered medications for this encounter.    Continuous IV Infusions:  No current facility-administered medications for this encounter.    PRN Meds:  No current facility-administered medications for this encounter.    ED Triage Vitals   Temperature Pulse Respirations Blood Pressure SpO2 Pain Score   04/18/25 0717 04/17/25 2328 04/17/25 2328 04/17/25 2328 04/17/25 2328 04/18/25 0826   97.8 °F (36.6 °C) 90 18 (!) 191/101 99 % 2     Weight (last 2 days) before discharge       None            Vital Signs (last 3 days) before discharge       Date/Time Temp Pulse Resp BP MAP (mmHg) SpO2 O2 Device Cardiac (WDL) Patient Position - Orthostatic VS Pain    04/25/25 1308 -- 97 -- 127/78 -- -- -- -- -- 5 04/25/25 1018 98.1 °F (36.7 °C) 87 18 135/93 104 97 % None (Room air) -- Lying --    04/25/25 0733 -- -- -- -- -- -- -- -- -- 4 04/25/25 0411 97.7 °F (36.5 °C) 76 18 133/76 -- 97 % None (Room air) -- Lying --    04/25/25 0126 -- 85 -- 134/83 -- -- -- -- Sitting --    04/24/25 2357 -- -- -- -- -- -- -- -- -- 4 04/24/25 2341 98.1 °F (36.7 °C) 82 18 135/76 -- 97 % None (Room air) -- Sitting 4 04/24/25 2117 98.1 °F (36.7 °C) 89 18 134/84 -- 97 % None (Room air) -- Sitting 4 04/24/25 2000 -- -- -- -- -- -- None (Room air) WDL -- --    04/24/25 1825 -- -- -- -- -- -- -- -- -- 6 04/24/25 1804 -- -- -- -- -- -- -- -- -- 8    04/24/25 1615 98.2 °F (36.8 °C) 82 18 140/85 -- 97 % -- -- Lying 5 04/24/25 1500 -- 86 -- 155/95 -- -- -- -- -- --    04/24/25 1151 98 °F (36.7 °C) 83 18 138/74 -- 98 % None (Room air) -- Sitting 3    04/24/25 1120 -- -- -- -- -- -- -- -- -- 5 04/24/25 1116 -- -- -- -- -- -- -- -- -- 6 04/24/25  1115 -- -- -- -- -- -- -- -- -- 6 04/24/25 1114 -- -- -- -- -- -- -- -- -- 6 04/24/25 1012 -- -- -- -- -- -- None (Room air) WDL -- --    04/24/25 0955 98 °F (36.7 °C) 84 16 153/101 -- 97 % -- -- Sitting 5 04/24/25 0604 -- -- -- -- -- -- -- -- -- 4 04/24/25 0413 97.6 °F (36.4 °C) 74 -- 120/70 -- -- -- -- -- --    04/24/25 0122 -- 84 -- 140/92 -- -- -- -- -- --    04/24/25 0054 -- 95 18 167/86 -- -- -- -- -- --    04/24/25 0034 97.6 °F (36.4 °C) 98 18 160/98 -- 98 % None (Room air) -- Sitting 6 04/24/25 0021 -- -- -- -- -- -- -- -- -- 7 04/23/25 2116 -- 79 -- 142/77 -- -- -- WDL -- 4 04/23/25 1949 98 °F (36.7 °C) 96 20 158/89 -- 98 % None (Room air) -- Lying 4 04/23/25 1655 -- -- -- -- -- -- -- -- -- 6 04/23/25 1612 97.6 °F (36.4 °C) 99 20 142/92 -- 100 % None (Room air) -- Lying 4 04/23/25 1450 -- -- -- -- -- -- -- -- -- 5 04/23/25 1227 98.5 °F (36.9 °C) 99 20 142/89 -- -- -- -- -- 4 04/23/25 1146 -- -- -- -- -- -- -- -- -- 6 04/23/25 0937 -- -- -- -- -- -- -- WDL -- 6    04/23/25 0929 98 °F (36.7 °C) 93 20 146/84 -- 99 % -- -- -- 6 04/23/25 0417 97.6 °F (36.4 °C) 84 18 132/75 -- 100 % None (Room air) -- Sitting --    04/23/25 0410 -- -- -- -- -- -- -- -- -- 6 04/22/25 2350 97.7 °F (36.5 °C) 88 18 149/85 -- 98 % None (Room air) -- Lying 5 04/22/25 2108 -- -- -- -- -- -- -- -- -- 8 04/22/25 2013 98 °F (36.7 °C) 89 18 137/78 102 98 % None (Room air) -- Lying 8 04/22/25 2010 -- -- -- -- -- -- None (Room air) WDL -- 8 04/22/25 1700 98 °F (36.7 °C) -- -- -- -- -- -- -- -- --    04/22/25 1530 -- -- -- -- -- -- -- -- -- --    OBSERV: Pt ambulating w/ sister and RN to NICU. Plan of care for this afternoon discussed. at 04/22/25 1530    04/22/25 1500 -- -- -- -- -- -- -- -- -- --    OBSERV: See new order to d/c po labetalol at this time per MD. at 04/22/25 1500    04/22/25 1458 -- 87 18 120/66 -- 100 % -- -- -- --    04/22/25 1400 -- -- -- -- -- -- -- -- -- --     OBSERV: Dr. Townsend at bedside to evaluate SBP >150 and drainage from incision.  Plan to recheck BP and give po labetalol if SBP > 150. Serosang drain noted.  MD applied clean dressing.  See provider's note.  Plan is to continue to monitor. at 04/22/25 1400    04/22/25 1259 -- -- -- -- -- -- -- -- -- 4    04/22/25 1249 -- 99 20 155/82 -- -- -- -- -- --    04/22/25 0931 98.5 °F (36.9 °C) 96 20 137/88 -- 98 % -- -- -- 4 04/22/25 0927 -- -- -- -- -- -- -- -- -- 5    04/22/25 0801 -- -- -- -- -- -- -- WDL -- 4 04/22/25 0616 -- -- -- -- -- -- -- -- -- 6    04/22/25 0604 -- 88 19 135/68 -- 97 % None (Room air) -- Lying --    04/22/25 0505 -- -- -- 127/69 -- -- -- -- Lying --    04/22/25 0412 97.6 °F (36.4 °C) 105 18 159/93 -- 99 % None (Room air) -- Lying 4    04/22/25 0205 97.7 °F (36.5 °C) 71 18 143/82 -- 98 % None (Room air) -- Lying --    04/22/25 0002 97.7 °F (36.5 °C) 91 18 141/88 -- 98 % None (Room air) -- Sitting 3              Pertinent Labs/Diagnostic Test Results:   Radiology:  No orders to display     Cardiology:  No orders to display     GI:  No orders to display           Results from last 7 days   Lab Units 04/22/25  0205 04/21/25  2101 04/21/25  1542   WBC Thousand/uL 17.89* 19.15* 18.20*   HEMOGLOBIN g/dL 9.9* 10.5* 10.8*   HEMATOCRIT % 30.6* 31.9* 34.1*   PLATELETS Thousands/uL 262 279 286         Results from last 7 days   Lab Units 04/22/25  0205 04/21/25  2101 04/21/25  1542   SODIUM mmol/L 135 134* 136   POTASSIUM mmol/L 4.0 4.4 4.4   CHLORIDE mmol/L 107 107 109*   CO2 mmol/L 21 20* 20*   ANION GAP mmol/L 7 7 7   BUN mg/dL 13 13 13   CREATININE mg/dL 0.96 1.01 1.05   EGFR ml/min/1.73sq m 74 69 66   CALCIUM mg/dL 7.3* 7.3* 7.4*   MAGNESIUM mg/dL 5.2* 5.3* 5.2*     Results from last 7 days   Lab Units 04/22/25  0205 04/21/25  2101 04/21/25  1542   AST U/L 51* 64* 78*   ALT U/L 121* 146* 162*   ALK PHOS U/L 100 102 105*   TOTAL PROTEIN g/dL 5.3* 5.4* 5.5*   ALBUMIN g/dL 2.7* 2.7* 2.8*   TOTAL  BILIRUBIN mg/dL 0.26 0.29 0.31           Results from last 7 days   Lab Units 25  0205 25  2101 25  1542   GLUCOSE RANDOM mg/dL 113 142* 163*             Past Medical History:   Diagnosis Date    Depression     years ago hx of- no meds > 20 yrs    Female infertility     [facility]    Migraine     Hx of  none since middle school    Varicella     childhood chickenpox     Present on Admission:   Diet controlled gestational diabetes mellitus (GDM) in third trimester   AMA (advanced maternal age) primigravida 35+, third trimester      Admitting Diagnosis: Pregnant [Z34.90]  Hypertension [I10]  Encounter for  delivery without indication [O82]  Age/Sex: 39 y.o. female    Network Utilization Review Department  ATTENTION: Please call with any questions or concerns to [phone] and carefully listen to the prompts so that you are directed to the right person. All voicemails are confidential.   For Discharge needs, contact Care Management DC Support Team at [phone] opt. 2  Send all requests for admission clinical reviews, approved or denied determinations and any other requests to dedicated fax number below belonging to the [place] where the patient is receiving treatment. List of dedicated fax numbers for the Facilities:  FACILITY NAME UR FAX NUMBER   ADMISSION DENIALS (Administrative/Medical Necessity) [phone]   DISCHARGE SUPPORT TEAM (NETWORK) [phone]   PARENT CHILD HEALTH (Maternity/NICU/Pediatrics) [phone]   [facility] [phone]   [facility] [phone]   [facility] [phone]   [facility] [phone]   [facility] [phone]   [facility] [phone]   [facility] [phone]   [facility] [phone]   [facility]  [phone]   [facility] [phone]   [facility] [phone]   [facility] [phone]       NOTIFICATION OF ADMISSION DISCHARGE   This is a Notification of Discharge from [facility]. Please be advised that this patient has been discharge from our facility. Below you will find the admission and discharge date and time including the patient’s disposition.   UTILIZATION REVIEW CONTACT:  Utilization Review Assistants  Network Utilization Review Department  Phone: [phone] x carefully listen to the prompts. All voicemails are confidential.  Email: [email or web address]     ADMISSION INFORMATION  PRESENTATION DATE: 4/17/2025 11:33 PM  OBERVATION ADMISSION DATE: N/A  INPATIENT ADMISSION DATE: 4/18/25  3:00 AM   DISCHARGE DATE: 4/25/2025  3:30 PM   DISPOSITION:Home/Self Care    Network Utilization Review Department  ATTENTION: Please call with any questions or concerns to [phone] and carefully listen to the prompts so that you are directed to the right person. All voicemails are confidential.   For Discharge needs, contact Care Management DC Support Team at [phone] opt. 2  Send all requests for admission clinical reviews, approved or denied determinations and any other requests to dedicated fax number below belonging to the campus where the patient is receiving treatment. List of dedicated fax numbers for the Facilities:  FACILITY NAME UR FAX NUMBER   ADMISSION DENIALS (Administrative/Medical Necessity) [phone]   DISCHARGE SUPPORT TEAM ([facility]) [phone]   PARENT CHILD HEALTH (Maternity/NICU/Pediatrics) [phone]   [facility] [phone]   [facility] [phone]   [facility] [phone]   [facility] [phone]   Critical access hospital  [phone]   [facility] [phone]   [facility] [phone]   [facility] [phone]   [facility] [phone]   [facility] [phone]   [facility] [phone]   [facility] [phone]

## 2025-05-01 ENCOUNTER — POSTPARTUM VISIT (OUTPATIENT)
Dept: OBGYN CLINIC | Facility: MEDICAL CENTER | Age: 40
End: 2025-05-01

## 2025-05-01 ENCOUNTER — LACTATION ENCOUNTER (OUTPATIENT)
Dept: OTHER | Facility: HOSPITAL | Age: 40
End: 2025-05-01

## 2025-05-01 VITALS
WEIGHT: 245 LBS | BODY MASS INDEX: 45.08 KG/M2 | SYSTOLIC BLOOD PRESSURE: 128 MMHG | HEIGHT: 62 IN | DIASTOLIC BLOOD PRESSURE: 78 MMHG

## 2025-05-01 DIAGNOSIS — O14.13 SEVERE PRE-ECLAMPSIA IN THIRD TRIMESTER: ICD-10-CM

## 2025-05-01 DIAGNOSIS — Z13.31 POSITIVE DEPRESSION SCREENING: ICD-10-CM

## 2025-05-01 PROCEDURE — 99024 POSTOP FOLLOW-UP VISIT: CPT | Performed by: CLINICAL NURSE SPECIALIST

## 2025-05-01 NOTE — LACTATION NOTE
This note was copied from a baby's chart.  CONSULT - LACTATION  Baby Girl Mckinney (Heather) 10 days female MRN: [id number]    [facility] Room / Bed: NICU /NICU  Encounter: [id number]    Maternal Information     MOTHER:  Teri Mckinney  Maternal Age: 39 y.o.  OB History: # 1 - Date: 25, Sex: Female, Weight: 2250 g (4 lb 15.4 oz), GA: 34w4d, Type: , Low Transverse, Apgar1: 7, Apgar5: 8, Living: Living, Birth Comments: None   Previous breast reduction surgery? No    Lactation history:   Has patient previously breast fed: No   How long had patient previously breast fed:     Previous breast feeding complications:       Past Surgical History:   Procedure Laterality Date    OVUM / OOCYTE RETRIEVAL      CA  DELIVERY ONLY N/A 2025    Procedure:  SECTION ();  Surgeon: Genoveva Kathleen MD;  Location: [facility] ;  Service: Obstetrics    WISDOM TOOTH EXTRACTION             Birth information:  YOB: 2026   Time of birth: 6:01 AM   Sex: female   Delivery type: , Low Transverse   Birth Weight: 2250 g (4 lb 15.4 oz)   Percent of Weight Change: 4%     Gestational Age: 34w4d   [unfilled]    Reason for Consult:    Reason for Consult: Initial assessment (ext) - 20 min, Latch Assess (ext) - 20 min, Pump Follow Up - 5 min    Risk Factors:    Risk Factors: Maternal anatomy (large breasts)    Breast and nipple assessment:   Breasts/Nipples  Date Pumping Initiated: 25  Left Breast: Soft (large breasts)  Right Breast: Soft (large breasts)  Left Nipple: Everted  Right Nipple: Everted  Intervention: Hand expression, Breast pump  Breastfeeding Status: Yes  Breastfeeding Progress: Not yet established    OB Lactation Tools:    Other OB Lactation Tools  Feeding Devices: Pump, Bottle    Breast Pump:    Breast Pump  Pump: Personal, Electric, Manual (medela and Mom cozy)  Pump Review/Education: Setup, frequency, and cleaning, Milk  storage  Initiated by: staff  Date Initiated: 25      [place] Assessment:  in NICU with tube feeding     Feeding assessment: feeding well  LATCH:  Latch: Repeated attempts, hold nipple in mouth, stimulate to suck   Audible Swallowing: A few with stimulation   Type of Nipple: Everted (After stimulation)   Comfort (Breast/Nipple): Soft/non-tender   Hold (Positioning): Partial assist, teach one side, mother does other, staff holds   LATCH Score: 7       [place]:                   Feeding recommendations:   mixed feeding plan     Initial: Nurse called out for assistance in latching baby to dry breast for first time. Mom had pumped for 20 minutes prior to latching. Mom collected 20 mLs. Mom is currently pumping every 2-3 hours and collecting 20 mLs total.     Baby cueing, repositioned mom in football hold on right breast. Support of pillow under breast to lift breast. Brought baby to breast, encouraged U shape hold of breast. Baby latched deeply. Baby actively sucking. Baby getting frustrated and latching and unlatching. Transitioned to left breast in football hold and mom latched baby deeply. Baby suckling at breast but easily becomes frustrated. LC david up 1 mL of mom's expressed breast milk. Placed drops of milk on mom's nipple while baby suckling. Baby hanging on breast and then easily becomes frustrated. Mom brought baby up to chest level and bottle fed Enfamil AR.     Mom concerned she is not producing enough milk. Mom noticed no difference in breasts during pregnancy. Mom declines getting engorged when mature milk came in. Encouraged mom to try power pumping. In addition to her regular pumping schedule. Encouraged to use other senses when away from baby. Measured mom's nipples at 21 mm, provided 21mm flex shields for ameda pump.     Enc mom to put baby to dry breast as much as possible. Encouraged mom to pump 10 minutes prior to latching and then latch baby. After dry breast feeding, pump for an  additional 10 minutes. Mom verbalized understanding. Encouraged to call for further assistance.     - Add in power pumping to assist in increasing milk supply. Take 2 consecutive days, 6 hours each day. Pump every hour for approx. 5-10 min within the 6 hours. Continue your regular schedule pumping and feeding during this power pumping session.    Here is a sample schedule for power pumpin:00 pm- Pump your regular 20 minutes pumping session     12:40 pm- Pump for 10 min.     1:00 pm - Pump for 10 min. Sometime within this hr.     2:00 pm - Pump your regular 20 minutes pumping session     2:40 pm - Pump for 10 min.     3:00 pm - Pump for 10 min. Sometime within this hr.     4:00 pm - Pump your regular 20 minutes pumping session     4:40 pm - Pump for 10 min.     5:00 pm - pump for 10. Sometime within this hr.     6:00 pm - Pump your regular 20 minutes pumping session     6:40 pm - Pump for 10 min.        Discharge feeding plan for NICU Pumping     Set alarms to pump every 2 hrs during the day and every 3 hrs at night  Use massage, warmth, & hand expression to stimulate glandular tissue prior to pumping.  May use nipple cream/butter/oil where tunnel and funnel meet on flange to assist movement of breast tissue inside the flange  Use breast compressions, hands on pumping techniques to assist in expressing milk    Cycle pumping - Begin the pump in stimulation mode. Once milk is visible in the tunnel of the flange, change pump setting to expression mode. Once milk is NOT seen in the tunnel, cycle back to stimulation mode.Continue cycle pumping until end of feeding.    Pump both breasts simultaneously  Use all 5 senses when pumping to increase milk transfer.  Store expressed milk or feed expressed milk via syringe, NG tube or paced bottle feeding method.   Continue to hand express between feeds to stimulate the breasts frequently    Review Milkmob on youtube or scan QR code for MilkMob video  When with baby, place  baby skin to skin. Attempt to latch when medically cleared.         Milk Tato Tolbert MA, IBCLC  5/1/2025 2:57 PM

## 2025-05-01 NOTE — PROGRESS NOTES
Name: Teri Mckinney      : 1985      MRN: [id number]  Encounter Provider: GODFREY Castaneda  Encounter Date: 2025   Encounter department: [facility]    :  Assessment & Plan  Postpartum care following  delivery  10days PostPartum.  Doing well so far  No abnormal bowel/bladder function  Lochia light  Perineum incision healing well w/o signs of infection or wound disruption.         Severe pre-eclampsia in third trimester  Blood Pressure: 128/78  Denies HA, vision changes or RUQ pain  Wt down 29 lbs already  Will continue current management   Continue home monitoring F/u in 10-14d         Positive depression screening  Postpartum Depression: High Risk (2025)    [place]  Depression Scale    • Last EPDS Total Score: 13    • Last EPDS Self Harm Result: Never   Tearful at ttimes during visit.  Feels tearfulness r/t baby being in NICU and not home with her. More situational. Believes she will feel better when baby home with her.   Discussed warning signs with her to report.  Offered cousneling and declined. Will f/u next pp visit.                  POSTPARTUM VISIT.     Patient ID: Teri Mckinney is a 39 y.o. female.  She presents for routine postpartum visit  She is now a  who is 10days  s/p pLTCS on 25 at 34w4d due to failed IOL ( pre-eclampsia).  Baby girl [place]. Still in NICU  PP course required titration of antihypertensives and was d/c on procardia XL 60 mg BID and labetolol 200 mg BID    Checking BP at home w/o any elevations 130's/70-80's    Today she denies HA, vision changes or RUQ pain  She denies abn bleeding, pelvic pain, breast complaints, bowel/bladder dysfunction, depression/anx.   Baby is thriving and is Using breast pump    Antepartum complications include:  AMA  Infertility- IVF pregnancy  Obesity   GDM-diet  Severe pre-e      Dickson  Depression Scale Total: 13  Plans for contraception: no  "method, hx of infertility      The following portions of the patient's history were reviewed and updated as appropriate: allergies, current medications, past family history, past medical history, past social history, past surgical history, and problem list.    Review of Systems  See HPI for pertinent positives.           Objective   /78 (BP Location: Left arm, Patient Position: Sitting, Cuff Size: Large)   Ht 5' 2\" (1.575 m)   Wt 111 kg (245 lb)   LMP 08/19/2024   Breastfeeding Yes   BMI 44.81 kg/m²     Physical Exam  Constitutional:       General: She is not in acute distress.     Appearance: Normal appearance.   Genitourinary:      Genitourinary Comments: Pelvic exam deferred     Cardiovascular:      Rate and Rhythm: Normal rate.   Pulmonary:      Effort: Pulmonary effort is normal.   Abdominal:      Palpations: Abdomen is soft.       Musculoskeletal:         General: Normal range of motion.   Neurological:      Mental Status: She is alert and oriented to person, place, and time.   Skin:     General: Skin is warm and dry.   Psychiatric:         Mood and Affect: Mood normal.         Behavior: Behavior normal.             "

## 2025-05-01 NOTE — ASSESSMENT & PLAN NOTE
Blood Pressure: 128/78  Denies HA, vision changes or RUQ pain  Wt down 29 lbs already  Will continue current management   Continue home monitoring F/u in 10-14d

## 2025-05-02 ENCOUNTER — OFFICE VISIT (OUTPATIENT)
Dept: POSTPARTUM | Facility: CLINIC | Age: 40
End: 2025-05-02
Payer: COMMERCIAL

## 2025-05-02 PROCEDURE — 99404 PREV MED CNSL INDIV APPRX 60: CPT | Performed by: PEDIATRICS

## 2025-05-02 NOTE — PROGRESS NOTES
INITIAL BREAST FEEDING EVALUATION    Informant/Relationship: Teri (mom/self)     Discussion of General Lactation Issues: Baby girl, [place], is in the NICU. She was born at 34 weeks and is struggling most with feedings. She is currently on thickened formula.     Infant is 11 days old today.        History:  Fertility Problem:yes - IVF  Breast changes: areola darkened, did not notice enlargement    :  BP was 190/100, she was induced and Mom was not progressing, C/S.   Full term:no   labor:yes - born at 34 and 4   First nursing/attempt < 1 hour after birth: baby went right to NICU, first latching attempt yesterday. She did latch right away yesterday.   Skin to skin following delivery:no  Breast changes after delivery: has not noticed breast changes since delivery   Rooming in (infant in room with mother with exception of procedures, eg. Circumcision: no  Blood sugar issues:no  NICU stay:yes - since delivery   Jaundice:no  Phototherapy:no  Supplement given: (list supplement and method used as well as reason(s):yes - originally she was tube fed neosure, reflux required her to need thickened formula     Past Medical History:   Diagnosis Date    Depression     years ago hx of- no meds > 20 yrs    Female infertility     [facility]    Migraine     Hx of  none since middle school    Varicella     childhood chickenpox         Current Outpatient Medications:     benzocaine-menthol-lanolin-aloe (DERMOPLAST) 20-0.5 % topical spray, Apply 1 Application topically every 6 (six) hours as needed for irritation or mild pain, Disp: , Rfl:     Blood Glucose Monitoring Suppl (Contour Next EZ) w/Device KIT, Test x4 Daily or as instructed, Disp: 1 kit, Rfl: 0    calcium carbonate (Tums) 500 mg chewable tablet, Chew 1 tablet daily, Disp: , Rfl:     choline fenofibrate (TRILIPIX) 135 MG capsule, Take 265 mg by mouth daily, Disp: , Rfl:     docusate sodium (COLACE) 100 mg capsule, Take 100 mg by mouth 2 (two) times a day  (Patient not taking: Reported on 4/4/2025), Disp: , Rfl:     hydrocortisone 1 % cream, Apply 1 Application topically daily as needed for rash or irritation, Disp: , Rfl:     labetalol (NORMODYNE) 200 mg tablet, Take 1 tablet (200 mg total) by mouth 2 (two) times a day, Disp: 60 tablet, Rfl: 0    Microlet Lancets MISC, Use 4 a Day or as instructed, Disp: 100 each, Rfl: 3    NIFEdipine (ADALAT CC) 60 MG 24 hr tablet, Take 1 tablet (60 mg total) by mouth 2 (two) times a day, Disp: 60 tablet, Rfl: 0    oxyCODONE (ROXICODONE) 5 immediate release tablet, Take 1 tablet (5 mg total) by mouth every 4 (four) hours as needed for severe pain for up to 10 days Max Daily Amount: 30 mg, Disp: 3 tablet, Rfl: 0    Prenatal Vit-Fe Fumarate-FA (PRENATAL PO), Take by mouth, Disp: , Rfl:     witch hazel-glycerin (TUCKS) topical pad, Apply 1 Pad topically every 4 (four) hours as needed for irritation or hemorrhoids, Disp: , Rfl:     No Known Allergies    Social History     Substance and Sexual Activity   Drug Use Never    Comment: pt denies, FOB- denies, FOB's brother drug hx- clean x 5 yrs       Social History     Interval Breastfeeding History:    Frequency of breast feeding: none  Does mother feel breastfeeding is effective: No  Does infant appear satisfied after nursing:No  Stooling pattern normal: Yes  Urinating frequently:Yes  Using shield or shells: No    Alternative/Artificial Feedings:   Bottle: Yes, Dr. Brown's     Syringe/Finger: Yes, NG tube if not finishing bottles            Formula Type: Enfamil AR                      Amount: 42 mL             Breast Milk:                      Amount: added in when available             Frequency Q 3  Hr between feedings scheduled in NICU        Equipment:    Pump            Type: Medela             Frequency of Use: every 2-3 hours   Up to 1 oz, sometimes less     Using 19 mm insert     Equipment Problems: yes unsure on how to go from [facility] pump to medela pump     Mom:  Breast:  Normal, large, pendulous, glandular tissue palpable towards the front on of the breast,   Nipple Assessment in General: Normal: elongated/eraser, no discoloration and no damage noted.  Support System: FOB, extended family   History of Breastfeeding: none   Changes/Stressors/Violence: Baby is in NICU, Mom is not seeing more than 1 oz from pumping   Concerns/Goals: Teri desires to be able to breastfeed directly or provide breastmilk via bottle.     Problems with Mom: low production     Physical Exam  Constitutional:       Appearance: Normal appearance.   HENT:      Head: Normocephalic.   Pulmonary:      Effort: Pulmonary effort is normal.   Musculoskeletal:         General: Normal range of motion.      Cervical back: Normal range of motion.   Neurological:      General: No focal deficit present.      Mental Status: She is alert and oriented to person, place, and time.   Skin:     General: Skin is warm.      Capillary Refill: Capillary refill takes less than 2 seconds.   Psychiatric:         Mood and Affect: Mood normal.         Behavior: Behavior normal.         Thought Content: Thought content normal.       Education:   Reviewed Frequency/Supply & Demand: pump every 2-3 h, take one 4-5 h break overnight to rest   Reviewed Mom/Breast care: gentle handling of the breast at all times   Reviewed Equipment: reviewed flange fit, observed pumping session, reviewed cycle pumping and using lowest effective suction.       Plan:  Continue pumping every 2-3 h during the day and overnight at least 2-3 times. Skin to skin with baby when possible. Obtain 14 mm flange or flange insert. Cycle pumping, always use lowest effective suction. Follow up with breastfeeding medicine as schedule.     I have spent 60 minutes with Patient and family today in which greater than 50% of this time was spent in counseling/coordination of care regarding Patient and family education.

## 2025-05-03 NOTE — PROGRESS NOTES
I have reviewed the notes, assessments, and/or procedures performed by Anuradha Dunlap RN, IBCLC, I concur with her/his documentation of Teri Romero MD 05/03/25

## 2025-05-05 ENCOUNTER — LACTATION ENCOUNTER (OUTPATIENT)
Dept: OTHER | Facility: HOSPITAL | Age: 40
End: 2025-05-05

## 2025-05-05 ENCOUNTER — TELEPHONE (OUTPATIENT)
Dept: OBGYN CLINIC | Facility: CLINIC | Age: 40
End: 2025-05-05

## 2025-05-05 NOTE — LACTATION NOTE
This note was copied from a baby's chart.  CONSULT - LACTATION  Baby Girl Mckinney (Heather) 2 wk.o. female MRN: [id number]    [facility] NICU Room / Bed: NICU 02/NICU 02 Encounter: [id number]    Maternal Information     MOTHER:  Teri Mckinney  Maternal Age: 39 y.o.  OB History: # 1 - Date: 25, Sex: Female, Weight: 2250 g (4 lb 15.4 oz), GA: 34w4d, Type: , Low Transverse, Apgar1: 7, Apgar5: 8, Living: Living, Birth Comments: None   Previous breast reduction surgery? No    Lactation history:   Has patient previously breast fed: No   How long had patient previously breast fed:     Previous breast feeding complications:       Past Surgical History:   Procedure Laterality Date    OVUM / OOCYTE RETRIEVAL      MO  DELIVERY ONLY N/A 2025    Procedure:  SECTION ();  Surgeon: Genoveva Kathleen MD;  Location: [facility] ;  Service: Obstetrics    WISDOM TOOTH EXTRACTION             Birth information:  YOB: 2026   Time of birth: 6:01 AM   Sex: female   Delivery type: , Low Transverse   Birth Weight: 2250 g (4 lb 15.4 oz)   Percent of Weight Change: 10%     Gestational Age: 34w4d   [unfilled]    Reason for Consult:    Reason for Consult: Follow-up assessment (ext) - 20 min, Pump Follow Up - 5 min    Risk Factors:    Risk Factors: NICU infant, Maternal anatomy    Breast and nipple assessment:   Breasts/Nipples  Date Pumping Initiated: 25  Left Breast: Soft (large breast)  Right Breast: Soft (large breast)  Left Nipple: Everted  Right Nipple: Everted  Intervention: Hand expression, Breast pump  Breastfeeding Status: Yes  Breastfeeding Progress: Not yet established    OB Lactation Tools:    Other OB Lactation Tools  Feeding Devices: Pump, Bottle    Breast Pump:    Breast Pump  Pump: Personal, Electric, Manual (Medela Pump)  Pump Review/Education: Setup, frequency, and cleaning, Milk storage  Initiated by: staff  Date  Initiated: 25      [place] Assessment:  baby in NICU on feeding tube     Feeding recommendations:   mixed feeding plan     F/up assessment:     Teri Tolbert MA 2025 4:59 PM

## 2025-05-05 NOTE — TELEPHONE ENCOUNTER
PP assessment:  POSTPARTUM PHONE CALL ASSESSMENT    Date of Delivery: 25  Delivering Provider: ANNA Kathleen  Mode:   Delivery Notes/Complications: failured IOL, increase BP-  SECTION Girl   Do you still have bleeding/pain? If so, how much/how severe? Light blding, pain is getting better   Regular BMs/Urination? yes  Breastfeeding/Formula/Both? NICU- born at 34-4 wks - doing well- Pumping breasts  How are you doing emotionally? Ok, better   If struggling, obtain a EPDS Score: N/A  Do you have any other questions or concerns for us or your provider? no  Have you scheduled the pediatrician appointment with pediatrician? Awaiting D/C from NICU to schedule  Do you have a postpartum visit scheduled? yes   Date scheduled:  Provider: MAREN

## 2025-05-09 ENCOUNTER — OFFICE VISIT (OUTPATIENT)
Dept: POSTPARTUM | Facility: CLINIC | Age: 40
End: 2025-05-09
Payer: COMMERCIAL

## 2025-05-09 DIAGNOSIS — E88.819 INSULIN RESISTANCE: ICD-10-CM

## 2025-05-09 DIAGNOSIS — O92.4 HYPOGALACTIA: Primary | ICD-10-CM

## 2025-05-09 PROCEDURE — 99402 PREV MED CNSL INDIV APPRX 30: CPT | Performed by: PEDIATRICS

## 2025-05-09 PROCEDURE — 99205 OFFICE O/P NEW HI 60 MIN: CPT | Performed by: PEDIATRICS

## 2025-05-09 RX ORDER — METFORMIN HYDROCHLORIDE 500 MG/1
500 TABLET, EXTENDED RELEASE ORAL
Qty: 30 TABLET | Refills: 0 | Status: SHIPPED | OUTPATIENT
Start: 2025-05-09 | End: 2025-06-08

## 2025-05-09 NOTE — PATIENT INSTRUCTIONS
Start metformin 500 mg er; one caplet daily in the evening, either with dinner or before bed. In one week, if you have no side effects or are not bothered by them, go ahead and increase to 2 caplets. Reach out within 2-3 weeks with an update and we can discuss next steps including dosing adjustments.    You may also start Mother Love Goat's Rue. Follow the dosing on the bottle. This is available in a tincture or a pill and on the [employer] website for from [employer].    May try Torbnagun, marketed as Boob Food Too from [employer] at [employer]. The dosing is on the bottle. Use the Boob Food Too, not Boob Food, for better results and fewer side effects. You can also find this at [employer].

## 2025-05-09 NOTE — PROGRESS NOTES
Name: Teri Mckinney      : 1985      MRN: [id number]  Encounter Provider: Sameera Romero MD  Encounter Date: 2025   Encounter department: [facility] AND [facility]      BREAST FEEDING FOLLOW UP VISIT  :  Assessment & Plan  Hypogalactia    Orders:    metFORMIN (GLUCOPHAGE-XR) 500 mg 24 hr tablet; Take 1 tablet (500 mg total) by mouth daily with dinner    Insulin resistance    Orders:    metFORMIN (GLUCOPHAGE-XR) 500 mg 24 hr tablet; Take 1 tablet (500 mg total) by mouth daily with dinner    Discussed history and physical exam with Teri. Gave support for her continued effort to provide her milk for her child. Reassurance given that she is doing a great job. Reviewed the findings in Teri's history that are consistent with metabolic hurdles to milk production including obesity, older age, fertility issues, gestational diabetes, and gestation hypertension. Reviewed the connection between all of these conditions and insulin resistance. Discussed the link between insulin resistance and lower milk production and discussed how this could present as both lack of breast changes during pregnancy and lower milk production. Reassurance given that this is not due to anything that Teri did.    Reviewed the data, though limited, that metformin and goat's rue can help build both breast tissue and milk production. Reviewed the side effects of these products. Recommended starting metformin er 500 mg, 1 cap daily in the evening. This may be increased to 2 caps or 1000 mg in 1 week if no problematic symptoms. Reviewed also the information regarding a known brand of Goat's Rue. Also discussed the use of an herbal prolactin supplement that may increase overall nipple and areolar sensitivity and improve response to pumping. Again, gave a recommendation regarding a brand recommendation.    Continuing lactation support remains available.     History of Present Illness       Informant/Relationship:  "Teri/mom    Discussion of General Lactation Issues: [place] was conceived via IVF after 2 rounds of egg retrievals with non-viable eggs. Her AMH was considered low even for her age. Asia and her  had tried for several years before her work up showed the low AMH. Asia has a history of irregular periods that lead to starting OCP's at an early age. She stayed on these for years with no issues.    Teri took gonol, menopure, estradiol and others to help her conceive. She was also on progesterone through about the first 8 weeks to help maintain the pregnancy.   Teri's areolas darkened during the pregnancy, but did not grow. She did not notice any other changes, including no increased fullness or growth. She was especially surprised not to have any soreness as she had heard this was a common hallmark of pregnancy.    Teri's pregnancy was complicated by GDM that was diet controlled. She also had high blood pressure that was beginning to climb around 30 weeks and was considered pre-eclampsia with concomitant headache. Teri was admitted for induction that lead to a c/s within 3 days.    Asia went directly to the NICU after delivery due to maternal magnesium. [place] was initially tube fed. Teri was not given any instruction or education in hand expression or pumping for the first 3 days.    Teri's headache and other symptoms were improved once she was in the hospital. In fact, the headache that sent her to the hospital was less severe than her prepregnancy headaches, but was associated with higher blood pressure for which she was already monitoring herself.    Teri started pumping with the Ameda \"hospital grade\" pump every 2-3 hours at day 3 post partum. Pumping was not painful. Initially she obtained drops of colostrum. By day 6, Teri had an increase in quantity to a few ml's and by day 8-9 saw up to 20 ml and the color started to change.       Infant is 18 days old today, 34.4 at delivery       "   Interval Breastfeeding History:    Frequency of breast feeding: none  Does mother feel breastfeeding is effective: If no, explain: had A&B's at the breast and encouraged to stop direct feeding in the NICU  Does infant appear satisfied after nursing:If no, explain: had A&B's at the breast and encouraged to stop direct feeding in the NICU  Stooling pattern normal:Yes  Urinating frequently:Yes  Using shield or shells: no    Alternative/Artificial Feedings:   Bottle: Yes, Dr. Brown's; level 2 (due to using enfamil AR); paced feeding  Cup: No  Syringe/Finger: No           Formula Type: Enfamil AR                     Amount: 60-90 ml            Breast Milk:                      Amount: none (all collected milk is stored (frozen)            Frequency Q 2-4 Hr between feedings  Elimination Problems: No      Equipment:  Nipple Shield             Type: n/a             Size: n/a             Frequency of Use: n/a  Pump            Type: Medela PIS Max Flow when at home; Ameda when in NICU; using a 14 mm flange; only for pulling on the Medela            Frequency of Use: every 2-3 hours during the day, about every 4 hours over night; averages about 30-40 ml  Shells            Type: n/a            Frequency of use: n/a    Equipment Problems:no      Mom:  Breast: Large, slightly pendulous, round, and full shaped  Nipple Assessment in General: Normal: elongated/eraser, no discoloration and no damage noted.  Mother's Awareness of Feeding Cues                 Recognizes: Yes                  Verbalizes: Yes  Support System: FOB, extended family  History of Breastfeeding: FOB, extended family  Changes/Stressors/Violence: low milk production  Concerns/Goals: Teri wishes to feed her milk, even if thickening it is needed;     Problems with Mom: hypogalactia    Objective   LMP 08/19/2024      Review of Systems  OBGyn Exam        Infant:  Problems with infant: In NICU, planned discharge today          Education:  Reviewed  Frequency/Supply & Demand: Reviewed the importance of frequent effective milk removal to build and maintain milk production   Reviewed Alternative/Artificial Feedings: Reviewed paced bottle feeding  Reviewed Mom/Breast care: Reviewed the use of supplements that can increase milk production by regulating insulin resistance and metabolism; reviewed the use of a supplement that may improve nipple and areolar sensitivity to stimulus      Administrative Statements   I have spent a total time of 90 minutes in caring for this patient on the day of the visit/encounter including Prognosis, Risks and benefits of tx options, Instructions for management, Patient and family education, Importance of tx compliance, Risk factor reductions, Impressions, Counseling / Coordination of care, Documenting in the medical record, Reviewing/placing orders in the medical record (including tests, medications, and/or procedures), and Obtaining or reviewing history  .

## 2025-05-09 NOTE — PROGRESS NOTES
INITIAL BREAST FEEDING EVALUATION    Informant/Relationship: Teri    Discussion of General Lactation Issues: ***    Infant is 5 days old today.        History:  Fertility Problem:{yes/no/unknown:58732}  Breast changes:{yes/no/unknown:38933}  : {yes/no/unknown:97348}  Full term:{yes/no/unknown:58663}   labor:{yes/no/unknown:21423}  First nursing/attempt < 1 hour after birth:{yes/no/unknown:97223}  Skin to skin following delivery:{yes/no/unknown:12649}  Breast changes after delivery:{yes/no/unknown:56909}  Rooming in (infant in room with mother with exception of procedures, eg. Circumcision: {yes/no/unknown:95425}  Blood sugar issues:{yes/no/unknown:05024}  NICU stay:{yes/no/unknown:73478}  Jaundice:{yes/no/unknown:23714}  Phototherapy:{yes/no/unknown:76358}  Supplement given: (list supplement and method used as well as reason(s):{yes/no/unknown:18681}    Past Medical History:   Diagnosis Date    Depression     years ago hx of- no meds > 20 yrs    Female infertility     [place]    Migraine     Hx of  none since middle school    Varicella     childhood chickenpox         Current Outpatient Medications:     benzocaine-menthol-lanolin-aloe (DERMOPLAST) 20-0.5 % topical spray, Apply 1 Application topically every 6 (six) hours as needed for irritation or mild pain (Patient not taking: Reported on 2025), Disp: , Rfl:     Blood Glucose Monitoring Suppl (Contour Next EZ) w/Device KIT, Test x4 Daily or as instructed (Patient not taking: Reported on 2025), Disp: 1 kit, Rfl: 0    calcium carbonate (Tums) 500 mg chewable tablet, Chew 1 tablet daily (Patient not taking: Reported on 2025), Disp: , Rfl:     choline fenofibrate (TRILIPIX) 135 MG capsule, Take 265 mg by mouth daily (Patient not taking: Reported on 2025), Disp: , Rfl:     docusate sodium (COLACE) 100 mg capsule, Take 100 mg by mouth 2 (two) times a day (Patient not taking: Reported on 2025), Disp: , Rfl:     hydrocortisone  1 % cream, Apply 1 Application topically daily as needed for rash or irritation (Patient not taking: Reported on 5/2/2025), Disp: , Rfl:     labetalol (NORMODYNE) 200 mg tablet, Take 1 tablet (200 mg total) by mouth 2 (two) times a day, Disp: 60 tablet, Rfl: 0    Microlet Lancets MISC, Use 4 a Day or as instructed (Patient not taking: Reported on 5/2/2025), Disp: 100 each, Rfl: 3    NIFEdipine (ADALAT CC) 60 MG 24 hr tablet, Take 1 tablet (60 mg total) by mouth 2 (two) times a day, Disp: 60 tablet, Rfl: 0    Prenatal Vit-Fe Fumarate-FA (PRENATAL PO), Take by mouth, Disp: , Rfl:     witch hazel-glycerin (TUCKS) topical pad, Apply 1 Pad topically every 4 (four) hours as needed for irritation or hemorrhoids (Patient not taking: Reported on 5/2/2025), Disp: , Rfl:     No Known Allergies    Social History     Substance and Sexual Activity   Drug Use Never    Comment: pt denies, FOB- denies, FOB's brother drug hx- clean x 5 yrs       Social History     Interval Breastfeeding History:  Frequency of breast feeding: ***  Does mother feel breastfeeding is effective: {YES/ NO:84394}  Does infant appear satisfied after nursing:{YES/ NO:90533}  Stooling pattern normal: {YES/ NO:72544}  Urinating frequently:{YES/ NO:46159}  Using shield or shells: {yes:89181}    Alternative/Artificial Feedings:   Bottle: {YES-NO-NA:88107}  Cup: {YES-NO-NA:17731}  Syringe/Finger: {YES-NO-NA:24727}           Formula Type: ***                     Amount: ***            Breast Milk:                      Amount: ***            Frequency Q *** Hr between feedings  Elimination Problems: {YES/ NO:20454}      Equipment:  Nipple Shield             Type: ***             Size: ***             Frequency of Use: ***  Pump            Type: ***            Frequency of Use: ***  Shells            Type: ***            Frequency of use: ***    Equipment Problems: {Yes ... No:89051}    Mom:  Breast: {BREAST ASSESSMENT:09175}  Nipple Assessment in General: {NIPPLE  ASSESSMENT:44057}  Mother's Awareness of Feeding Cues                 Recognizes: {Yes/No:93286}                  Verbalizes: {Yes/No:43249}  Support System: ***  History of Breastfeeding: ***  Changes/Stressors/Violence: ***  Concerns/Goals: ***    Problems with Mom: ***    OBGyn Exam    Infant:  Behaviors: {BEHAVIORS:91237}  Color: {SKIN COLOR:47931}  Birth weight: ***  Current weight: ***    Problems with infant: ***    {SL IP EXAM; COMPLETE :98975}     Latch:  Efficiency:               Lips Flanged: {Yes/No:85651}              Depth of latch: ***              Audible Swallow: {Yes/No:61232}              Visible Milk: {Yes/No:35683}              Wide Open/ Asymmetrical: {Yes/No:26178}              Suck Swallow Cycle: Breathing: ***, Coordinated: ***  Nipple Assessment after latch: {NIPPLE ASSESSMENT:59967}  Latch Problems: ***    Position:  Infant's Ergonomics/Body               Body Alignment: {Yes/No:15087}               Head Supported: {Yes/No:41463}               Close to Mom's body/ Lifted/ Supported: {Yes/No:26621}               Mom's Ergonomics/Body: {Yes/No:22510}                           Supported: {Yes/No:25592}                           Sitting Back: {Yes/No:95659}                           Brings Baby to her breast: {Yes/No:41222}  Positioning Problems: ***      Handouts:   {Handout list:52792}    Education:  Reviewed Latch: ***  Reviewed Positioning for Dyad: ***  Reviewed Frequency/Supply & Demand: ***  Reviewed Infant:Cues and varied States of Awareness  Reviewed Infant Elimination: ***  Reviewed Alternative/Artificial Feedings: ***  Reviewed Mom/Breast care: ***  Reviewed Equipment: ***      Plan:  ***    I have spent *** minutes with {Patient /Family:59945} today in which greater than 50% of this time was spent in counseling/coordination of care regarding {AMB Counseling Topics:8170610186}.

## 2025-05-13 NOTE — PROGRESS NOTES
Name: Teri Mckinney      : 1985      MRN: [id number]  Encounter Provider: GODFREY Castaneda  Encounter Date: 2025   Encounter department: [facility]    :  Assessment & Plan  Postpartum care following  delivery  3 wks PostPartum.  Doing well so far  No abnormal bowel/bladder function  Lochia: resolved- just slight mucousy d/c  Incision: appears well healed.  F/U in 3 wks   Orders:  •  Ambulatory referral to Physical Therapy; Future    Severe pre-eclampsia in third trimester  Blood Pressure: 112/62  Denies HA, vision changes or RUQ pain  Wt down 20 lbs already  Concerned BP getting too low- will stop labetolol  Continue procardia as ordered and continue home monitoring BP - call if > 140/90  F/U in 3 wks.                           Patient ID: Teri Mckinney is a 39 y.o. female.  She presents for routine postpartum visit  She is now a  who is 3+wks  s/p pLTCS on 25 at 34w4d due to failed IOL ( pre-eclampsia).  Baby girl He[facility].  PP course required titration of antihypertensives and was d/c on procardia XL 60 mg BID and labetolol 200 mg BID     Checking BP at home w/o any elevations 110-130/70-80  BP check  wnl   Today she denies HA, vision changes or RUQ pain  She denies abn bleeding, pelvic pain, breast complaints, bowel/bladder dysfunction, depression/anx.   Baby is now home- doing well.   Pt is pumping and mixing w/ thickened formula     Antepartum complications include:  AMA  Infertility- IVF pregnancy  Obesity   GDM-diet  Severe pre-e     Plans for contraception: no method, hx of Infertility and IVF.      The following portions of the patient's history were reviewed and updated as appropriate: allergies, current medications, past family history, past medical history, past social history, past surgical history, and problem list.    Review of Systems  See HPI for pertinent positives.           Objective   /62 (BP Location:  "Left arm, Patient Position: Sitting, Cuff Size: Standard)   Ht 5' 2\" (1.575 m)   Wt 110 kg (242 lb)   LMP 08/19/2024   Breastfeeding Yes   BMI 44.26 kg/m²     Physical Exam  Constitutional:       General: She is not in acute distress.     Appearance: Normal appearance.   Genitourinary:      Vulva normal.      Uterus is not enlarged (involuted).      Pelvic exam was performed with patient in the lithotomy position.     Cardiovascular:      Rate and Rhythm: Normal rate.   Pulmonary:      Effort: Pulmonary effort is normal.   Abdominal:      General: There is no distension.      Palpations: Abdomen is soft.     Musculoskeletal:         General: Normal range of motion.     Neurological:      Mental Status: She is alert and oriented to person, place, and time.     Skin:     General: Skin is warm and dry.     Psychiatric:         Mood and Affect: Mood normal.         Behavior: Behavior normal.             "

## 2025-05-16 ENCOUNTER — POSTPARTUM VISIT (OUTPATIENT)
Dept: OBGYN CLINIC | Facility: MEDICAL CENTER | Age: 40
End: 2025-05-16

## 2025-05-16 VITALS
DIASTOLIC BLOOD PRESSURE: 62 MMHG | SYSTOLIC BLOOD PRESSURE: 112 MMHG | HEIGHT: 62 IN | BODY MASS INDEX: 44.53 KG/M2 | WEIGHT: 242 LBS

## 2025-05-16 DIAGNOSIS — Z98.891 S/P PRIMARY LOW TRANSVERSE C-SECTION: ICD-10-CM

## 2025-05-16 DIAGNOSIS — O14.13 SEVERE PRE-ECLAMPSIA IN THIRD TRIMESTER: ICD-10-CM

## 2025-05-16 PROCEDURE — 99024 POSTOP FOLLOW-UP VISIT: CPT | Performed by: CLINICAL NURSE SPECIALIST

## 2025-05-16 NOTE — ASSESSMENT & PLAN NOTE
Blood Pressure: 112/62  Denies HA, vision changes or RUQ pain  Wt down 20 lbs already  Concerned BP getting too low- will stop labetolol  Continue procardia as ordered and continue home monitoring BP - call if > 140/90  F/U in 3 wks.

## 2025-05-22 DIAGNOSIS — O92.4 HYPOGALACTIA: ICD-10-CM

## 2025-05-22 DIAGNOSIS — E88.819 INSULIN RESISTANCE: ICD-10-CM

## 2025-05-23 NOTE — TELEPHONE ENCOUNTER
RX for increased dose called to [facility] at [facility]'s request.  Patient notified.

## 2025-05-27 RX ORDER — METFORMIN HYDROCHLORIDE 500 MG/1
500 TABLET, EXTENDED RELEASE ORAL
Qty: 30 TABLET | Refills: 0 | OUTPATIENT
Start: 2025-05-27 | End: 2025-06-26

## 2025-06-05 NOTE — PROGRESS NOTES
Name: Teri Mckinney      : 1985      MRN: [id number]  Encounter Provider: GODFREY Moreno  Encounter Date: 2025   Encounter department: [facility] [place]    :  Assessment & Plan  Postpartum care following  delivery         Severe pre-eclampsia in third trimester         Diet controlled gestational diabetes mellitus (GDM) in third trimester    Lab Results   Component Value Date    HGBA1C 6.0 (H) 2025              6 wks PostPartum.  Doing well. No new c/o   Exam last visit wnl.     Pre- w/ SF-   D/C on procardia 60 mg BID and labetalol 200 mg BID. Procardia D/C'd at  PP visit due to low BP.   Here for recheck      She will RTO for routine annual gyn exam in 2-3 mos.        Patient ID: Teri Mckinney is a 39 y.o. female.  She presents for PP BP check  S/P pLTCS on  (failed IOL)  IOL due to Pre-e w/ SF at 34w4d   She is now a  who is ***wks s/p *** on ***.  Anesthesia: ***  Perineum: ***    Antepartum complications include:  Problem List[1]    Postpartum course was uncomplicated. Since d/c home she has had no complaints.   She denies abn bleeding, pelvic pain, breast complaints, bowel/bladder dysfunction, depression/anx.   Baby is thriving and is {Breastfeedin}       Plans for contraception: {Contraception:73357}    Pregnancy: [place]  Fetal sex: Female  Support person: Shell Pickard     Delivery Plans  Planned delivery method: Vaginal  Planned delivery location: [facility] L[facility]  Planned anesthesia: Epidural  Acceptable blood products: All     Post-Delivery Plans  Feeding intentions: Mixed Feeding  Cord blood plans: Do Not Collect    The following portions of the patient's history were reviewed and updated as appropriate: allergies, current medications, past family history, past medical history, past social history, past surgical history, and problem list.    Review of Systems  See HPI for pertinent positives.            Objective {?Quick Links Trend Vitals * Enter New Vitals * Results Review * Timeline (Adult) * Labs * Imaging * Cardiology * Procedures * Lung Cancer Screening * Surgical eConsent :89406}  LMP 2024     OBGyn Exam                             [1]  Patient Active Problem List  Diagnosis   • Diet controlled gestational diabetes mellitus (GDM) in third trimester   • AMA (advanced maternal age) primigravida 35+, third trimester   • Severe obesity due to excess calories affecting pregnancy in third trimester (HCC)   • Pregnancy resulting from in vitro fertilization in third trimester   • S/P primary low transverse    • Severe preeclampsia   • Postoperative pain

## 2025-06-06 ENCOUNTER — POSTPARTUM VISIT (OUTPATIENT)
Dept: OBGYN CLINIC | Facility: MEDICAL CENTER | Age: 40
End: 2025-06-06

## 2025-06-06 VITALS
WEIGHT: 240.6 LBS | SYSTOLIC BLOOD PRESSURE: 110 MMHG | BODY MASS INDEX: 44.27 KG/M2 | DIASTOLIC BLOOD PRESSURE: 76 MMHG | HEIGHT: 62 IN

## 2025-06-06 DIAGNOSIS — Z86.32 HISTORY OF DIET CONTROLLED GESTATIONAL DIABETES MELLITUS (GDM): ICD-10-CM

## 2025-06-06 DIAGNOSIS — Z87.59 HISTORY OF SEVERE PRE-ECLAMPSIA: ICD-10-CM

## 2025-06-06 DIAGNOSIS — Z30.011 ENCOUNTER FOR PRESCRIPTION OF ORAL CONTRACEPTIVES: ICD-10-CM

## 2025-06-06 PROCEDURE — 99024 POSTOP FOLLOW-UP VISIT: CPT | Performed by: NURSE PRACTITIONER

## 2025-06-06 RX ORDER — ACETAMINOPHEN AND CODEINE PHOSPHATE 120; 12 MG/5ML; MG/5ML
1 SOLUTION ORAL DAILY
Qty: 28 TABLET | Refills: 2 | Status: SHIPPED | OUTPATIENT
Start: 2025-06-06

## 2025-06-06 NOTE — PATIENT INSTRUCTIONS
May gradually resume normal activities.  Continue prenatal vitamin daily while breastfeeding   Consider taking prenatal vitamin 6-12 months before a future pregnancy to reduce risk of neural tube defect.  Start contraception today unless otherwise directed.   Rx sent to pharmacy on file.   Benefits, risks and alternatives of birth control discussed.  She denies cigarette smoking, high blood pressure, a history of DVT, known thrombogenic mutations, migraines with aura, breast cancer, or liver disease.   There are some drugs (such as certain anticonvulsants and antibiotics) that may decrease the contraceptive efficacy of OCPs, and she should call our office to confirm or use a backup method of contraception if taking these drugs.   Expect irregular bleeding for the first 3 months.   ACHES reviewed.     Exercise 150 minutes per week  minimum.    Resume sexual activity once comfortable and protected to do so. While breastfeeding, may need to use a lubricant with sex due to vaginal dryness.   If symptoms of depression occur, please call the office to schedule an appt. This may happen up until your baby's first birthday.  Referred to PT for postpartum evaluation.  Follow up with PCP for BP management. \  Complete 2 hour glucose test.   Return to office in 3-4 months for yearly exam, sooner as needed.

## 2025-06-06 NOTE — ASSESSMENT & PLAN NOTE
Recommend 2 hour GTT- already ordered    Lab Results   Component Value Date    HGBA1C 6.0 (H) 03/29/2025

## 2025-06-06 NOTE — PROGRESS NOTES
Name: Teri Mckinney      : 1985      MRN: [id number]  Encounter Provider: GODFREY Moreno  Encounter Date: 2025   Encounter department: [facility]  :  Assessment & Plan  Postpartum care following  delivery  Postpartum visit.   May gradually resume normal activities.  Continue prenatal vitamin daily while breastfeeding   Consider taking prenatal vitamin 6-12 months before a future pregnancy to reduce risk of neural tube defect.  Start contraception today unless otherwise directed.   Rx sent to pharmacy on file.   Benefits, risks and alternatives of birth control discussed.  She denies cigarette smoking, high blood pressure, a history of DVT, known thrombogenic mutations, migraines with aura, breast cancer, or liver disease.   There are some drugs (such as certain anticonvulsants and antibiotics) that may decrease the contraceptive efficacy of OCPs, and she should call our office to confirm or use a backup method of contraception if taking these drugs.   Expect irregular bleeding for the first 3 months.   ACHES reviewed.     Exercise 150 minutes per week  minimum.    Resume sexual activity once comfortable and protected to do so. While breastfeeding, may need to use a lubricant with sex due to vaginal dryness.   If symptoms of depression occur, please call the office to schedule an appt. This may happen up until your baby's first birthday.  Referred to PT for postpartum evaluation.  Return to office in 3-4 months for yearly exam, sooner as needed.        History of severe pre-eclampsia  Stable and will follow up with PCP for management.        History of diet controlled gestational diabetes mellitus (GDM)  Recommend 2 hour GTT- already ordered    Lab Results   Component Value Date    HGBA1C 6.0 (H) 2025            Encounter for prescription of oral contraceptives    Orders:    norethindrone (Cheryl) 0.35 MG tablet; Take 1 tablet  "(0.35 mg total) by mouth daily        History of Present Illness   HPI  Teri Mckinney  1985    S:  39 y.o. female here for postpartum visit.  She is s/p pLTCS on 4/21/25 at 34w4d due to failed IOL (pre-eclampsia).     PP course required titration of antihypertensives and was d/c on procardia XL 60 mg BID and labetolol 200 mg BID       She is now taking procardia XL 60 mg daily and discontinued labetolol under our recommendation.      Gender: female \"Asia Ludwig\"  Apgars: 7,, 8  Weight: 4 lbs 15.4 oz. Now weighs 6 lbs 15 oz at last appt.   Her lochia has resolved.  She had her first menses 5/29/25.   She is bottle fed formula without problems. She is pumping and freezing her breast milk.   She denies postpartum blues/depression.  Her EPDS is 0.    Substance use screen:O    Last Pap: 02/23/2021 normal with negative HR HPV     We discussed contraceptive options in detail including birth control pills, Depo Provera, Mirena/Kyleena IUD, Nexplanon in detail.  At this point she would like POP.     History obtained from: patient    Review of Systems  Medical History Reviewed by provider this encounter:  Tobacco  Allergies  Meds  Problems  Med Hx  Surg Hx  Fam Hx     .  Medications Ordered Prior to Encounter[1]   Social History[2]     Objective   /76 (BP Location: Left arm, Patient Position: Sitting, Cuff Size: Large)   Ht 5' 2\" (1.575 m)   Wt 109 kg (240 lb 9.6 oz)   LMP 05/29/2025   Breastfeeding Yes   BMI 44.01 kg/m²    O:   LMP 08/19/2024   She appears well and in no distress  Abdomen is soft and nontender; LTCS incision is fully healed.   External genitals are normal  Vagina is normal  Cervix, uterus and adnexa are nontender, no masses palpable.            [1]   Current Outpatient Medications on File Prior to Visit   Medication Sig Dispense Refill    calcium carbonate (Tums) 500 mg chewable tablet Chew 1 tablet in the morning.      metFORMIN (GLUCOPHAGE-XR) 500 mg 24 hr tablet Take 1 tablet (500 mg " total) by mouth daily with dinner (Patient taking differently: Take 1,000 mg by mouth daily with dinner) 30 tablet 0    NIFEdipine ER (ADALAT CC) 60 MG 24 hr tablet Take 1 tablet (60 mg total) by mouth in the morning and 1 tablet (60 mg total) before bedtime. 60 tablet 0    Prenatal Vit-Fe Fumarate-FA (PRENATAL PO) Take by mouth      docusate sodium (COLACE) 100 mg capsule Take 100 mg by mouth 2 (two) times a day (Patient not taking: Reported on 2025)      [DISCONTINUED] benzocaine-menthol-lanolin-aloe (DERMOPLAST) 20-0.5 % topical spray Apply 1 Application topically every 6 (six) hours as needed for irritation or mild pain (Patient not taking: Reported on 2025)      [DISCONTINUED] Blood Glucose Monitoring Suppl (Contour Next EZ) w/Device KIT Test x4 Daily or as instructed (Patient not taking: Reported on 2025) 1 kit 0    [DISCONTINUED] choline fenofibrate (TRILIPIX) 135 MG capsule Take 265 mg by mouth daily (Patient not taking: Reported on 2025)      [DISCONTINUED] hydrocortisone 1 % cream Apply 1 Application topically daily as needed for rash or irritation (Patient not taking: Reported on 2025)      [DISCONTINUED] Microlet Lancets MISC Use 4 a Day or as instructed (Patient not taking: Reported on 2025) 100 each 3    [DISCONTINUED] witch hazel-glycerin (TUCKS) topical pad Apply 1 Pad topically every 4 (four) hours as needed for irritation or hemorrhoids (Patient not taking: Reported on 2025)       No current facility-administered medications on file prior to visit.   [2]   Social History  Tobacco Use    Smoking status: Former     Current packs/day: 0.00     Average packs/day: 1 pack/day for 5.0 years (5.0 ttl pk-yrs)     Types: Cigarettes     Start date:      Quit date: 2017     Years since quittin.4    Smokeless tobacco: Never   Vaping Use    Vaping status: Never Used   Substance and Sexual Activity    Alcohol use: Not Currently     Comment: rarely none with pregnancy    Drug  use: Never     Comment: pt denies, FOB- denies, FOB's brother drug hx- clean x 5 yrs    Sexual activity: Not Currently     Partners: Male     Birth control/protection: None     Comment: desires pregnancy Gerardo WONG

## 2025-06-13 ENCOUNTER — NURSE TRIAGE (OUTPATIENT)
Age: 40
End: 2025-06-13

## 2025-06-13 NOTE — TELEPHONE ENCOUNTER
"REASON FOR CONVERSATION: Vaginal Bleeding    SYMPTOMS: vaginal bleeding    OTHER HEALTH INFORMATION: Patient with breakthrough vaginal bleeding while taking OCP. LMP . Started OCP . Bleeding began again . Postpartum  24    PROTOCOL DISPOSITION: Home Care    CARE ADVICE PROVIDED: Continue to monitor at home. Keep log of days and amounts of bleeding. Ensure adequate hydration and iron rich foods in diet. Continue with prenatal vitamin with iron. Return call if irregular bleeding occurs for more than 2 cycles, if bleeding lasts more than 7 days, if bleeding increases or severe abdominal pain occurs.     PRACTICE FOLLOW-UP: none           Reason for Disposition   Taking birth control pills and hasn't missed taking any pills    Answer Assessment - Initial Assessment Questions  1. BLEEDING SEVERITY: \"Describe the bleeding that you are having.\" \"How much bleeding is there?\"       Mild to moderate bleeding, changing pad hourly for comfort not saturating pads.   2. ONSET: \"When did the bleeding begin?\" \"Is it continuing now?\"      Yesterday   3. MENSTRUAL PERIOD: \"When was the last normal menstrual period?\" \"How is this different than your period?\"      LMP 25, then started bleeding again yesterday   4. REGULARITY: \"How regular are your periods?\"      Prior to pregnancy they were every 28-35 days   5. ABDOMEN PAIN: \"Do you have any pain?\" \"How bad is the pain?\"  (e.g., Scale 0-10; none, mild, moderate, or severe)      denies  6. PREGNANCY: \"Is there any chance you are pregnant?\" \"When was your last menstrual period?\"      Denies   7. BREASTFEEDING: \"Are you breastfeeding?\"      Pumping   8. HORMONE MEDICINES: \"Are you taking any hormone medicines, prescription or over-the-counter?\" (e.g., birth control pills, estrogen)      Started on OCP   9. BLOOD THINNER MEDICINES: \"Do you take any blood thinners?\" (e.g., Coumadin / warfarin, Pradaxa / dabigatran, aspirin)      denies  10. CAUSE: \"What do " "you think is causing the bleeding?\" (e.g., recent gyn surgery, recent gyn procedure; known bleeding disorder, cervical cancer, polycystic ovarian disease, fibroids)          Possibly PCOS not formally diagnosed   11. HEMODYNAMIC STATUS: \"Are you weak or feeling lightheaded?\" If Yes, ask: \"Can you stand and walk normally?\"         Denies   12. OTHER SYMPTOMS: \"What other symptoms are you having with the bleeding?\" (e.g., passed tissue, vaginal discharge, fever, menstrual-type cramps)        Denies    Protocols used: Vaginal Bleeding - Abnormal-Adult-OH    "

## 2025-06-18 DIAGNOSIS — O92.4 HYPOGALACTIA: ICD-10-CM

## 2025-06-18 DIAGNOSIS — E88.819 INSULIN RESISTANCE: ICD-10-CM

## 2025-06-18 RX ORDER — METFORMIN HYDROCHLORIDE 500 MG/1
500 TABLET, EXTENDED RELEASE ORAL
Qty: 30 TABLET | Refills: 0 | Status: CANCELLED | OUTPATIENT
Start: 2025-06-18 | End: 2025-07-18

## 2025-06-19 RX ORDER — METFORMIN HYDROCHLORIDE 500 MG/1
1500 TABLET, EXTENDED RELEASE ORAL
Qty: 90 TABLET | Refills: 0 | Status: SHIPPED | OUTPATIENT
Start: 2025-06-19 | End: 2025-07-19

## 2025-06-23 ENCOUNTER — OFFICE VISIT (OUTPATIENT)
Dept: FAMILY MEDICINE CLINIC | Facility: CLINIC | Age: 40
End: 2025-06-23
Payer: COMMERCIAL

## 2025-06-23 VITALS
SYSTOLIC BLOOD PRESSURE: 116 MMHG | WEIGHT: 241.2 LBS | HEART RATE: 116 BPM | TEMPERATURE: 97.4 F | DIASTOLIC BLOOD PRESSURE: 68 MMHG | HEIGHT: 62 IN | OXYGEN SATURATION: 98 % | BODY MASS INDEX: 44.39 KG/M2

## 2025-06-23 DIAGNOSIS — Z12.31 ENCOUNTER FOR SCREENING MAMMOGRAM FOR BREAST CANCER: ICD-10-CM

## 2025-06-23 DIAGNOSIS — Z00.00 ANNUAL PHYSICAL EXAM: Primary | ICD-10-CM

## 2025-06-23 DIAGNOSIS — M65.4 DE QUERVAIN'S TENOSYNOVITIS: ICD-10-CM

## 2025-06-23 DIAGNOSIS — O24.419 GESTATIONAL DIABETES MELLITUS (GDM) IN THIRD TRIMESTER, GESTATIONAL DIABETES METHOD OF CONTROL UNSPECIFIED: ICD-10-CM

## 2025-06-23 DIAGNOSIS — Z13.220 LIPID SCREENING: ICD-10-CM

## 2025-06-23 DIAGNOSIS — O14.13 SEVERE PRE-ECLAMPSIA IN THIRD TRIMESTER: ICD-10-CM

## 2025-06-23 DIAGNOSIS — R03.0 ELEVATED BLOOD PRESSURE READING: ICD-10-CM

## 2025-06-23 PROCEDURE — 99203 OFFICE O/P NEW LOW 30 MIN: CPT | Performed by: FAMILY MEDICINE

## 2025-06-23 PROCEDURE — 99396 PREV VISIT EST AGE 40-64: CPT | Performed by: FAMILY MEDICINE

## 2025-06-23 RX ORDER — NIFEDIPINE 30 MG
30 TABLET, EXTENDED RELEASE ORAL DAILY
Qty: 30 TABLET | Refills: 0 | Status: SHIPPED | OUTPATIENT
Start: 2025-06-23

## 2025-06-23 NOTE — ASSESSMENT & PLAN NOTE
Orders:    Diclofenac Sodium (VOLTAREN) 1 %; Apply 2 g topically 4 (four) times a day    Splint

## 2025-06-23 NOTE — ASSESSMENT & PLAN NOTE
History of severe preeclampsia, will lower dosage of nifedipine from 60 mg to 30 mg and monitor blood pressure daily    Orders:    NIFEdipine ER (ADALAT CC) 30 MG 24 hr tablet; Take 1 tablet (30 mg total) by mouth daily

## 2025-06-23 NOTE — PROGRESS NOTES
Adult Annual Physical  Name: Teri Mckinney      : 1985      MRN: [id number]  Encounter Provider: Alvino Andrews MD  Encounter Date: 2025   Encounter department: [facility]    :  Assessment & Plan  Annual physical exam  Will patient complete blood work    Orders:    Hemoglobin A1C; Future    CBC and differential; Future    Comprehensive metabolic panel; Future    Lipid panel; Future    Encounter for screening mammogram for breast cancer    Orders:    Mammo screening bilateral w 3d and cad; Future    Severe pre-eclampsia in third trimester  History of severe preeclampsia, will lower dosage of nifedipine from 60 mg to 30 mg and monitor blood pressure daily    Orders:    NIFEdipine ER (ADALAT CC) 30 MG 24 hr tablet; Take 1 tablet (30 mg total) by mouth daily    Elevated blood pressure reading    Orders:    NIFEdipine ER (ADALAT CC) 30 MG 24 hr tablet; Take 1 tablet (30 mg total) by mouth daily    CBC and differential; Future    Comprehensive metabolic panel; Future    De Quervain's tenosynovitis    Orders:    Diclofenac Sodium (VOLTAREN) 1 %; Apply 2 g topically 4 (four) times a day    Splint    Gestational diabetes mellitus (GDM) in third trimester, gestational diabetes method of control unspecified    Lab Results   Component Value Date    HGBA1C 6.0 (H) 2025       Orders:    Hemoglobin A1C; Future    Lipid screening    Orders:    Lipid panel; Future            Immunizations:  - Immunizations due: Prevnar 20         History of Present Illness     Adult Annual Physical:  Patient presents for annual physical. Teri is a 40-year-old female patient presents to [facility].  Patient recently gave birth to a healthy baby girl in April.  She did have preeclampsia during her pregnancy and is on nifedipine extended release 60 mg daily.  Her blood pressure today is 160/68.  She would like to see if she needs to continue this medication.  Patient denies any issues with high blood  "pressure prior to pregnancy however was informed that her blood pressure has been on the higher side of normal.  She also noticed some bilateral wrist pain during pregnancy, initially thought to be secondary to carpal tunnel however despite delivering she continues to have symptoms.  .     Diet and Physical Activity:  - Diet/Nutrition: well balanced diet. Breastfeeding  - Exercise: no formal exercise.    Depression Screening:  - PHQ-2 Score: 0    General Health:  - Sleep: 4-6 hours of sleep on average.  - Hearing: normal hearing bilateral ears.  - Vision: wears glasses and most recent eye exam < 1 year ago.  - Dental: regular dental visits and brushes teeth twice daily.    /GYN Health:  - Follows with GYN: yes.   - History of STDs: no    Advanced Care Planning:  - Has an advanced directive?: no    - Has a durable medical POA?: no      Review of Systems   Constitutional:  Negative for chills and fever.   HENT:  Negative for congestion, rhinorrhea and sore throat.    Respiratory:  Negative for chest tightness and shortness of breath.    Cardiovascular:  Negative for chest pain.   Gastrointestinal:  Negative for abdominal pain, blood in stool, constipation, diarrhea, nausea and vomiting.   Genitourinary:  Negative for dysuria, hematuria, vaginal bleeding and vaginal discharge.   Neurological:  Negative for dizziness, light-headedness and headaches.   Psychiatric/Behavioral:  Negative for sleep disturbance.        Objective   /68 (BP Location: Left arm, Patient Position: Sitting, Cuff Size: Standard)   Pulse (!) 116   Temp (!) 97.4 °F (36.3 °C)   Ht 5' 2\" (1.575 m)   Wt 109 kg (241 lb 3.2 oz)   LMP 05/29/2025   SpO2 98%   Breastfeeding Yes   BMI 44.12 kg/m²     Physical Exam  Vitals reviewed.   Constitutional:       General: She is not in acute distress.     Appearance: Normal appearance. She is obese. She is not ill-appearing, toxic-appearing or diaphoretic.     Cardiovascular:      Rate and Rhythm: " "Normal rate and regular rhythm.      Pulses: Normal pulses.      Heart sounds: Normal heart sounds. No murmur heard.  Pulmonary:      Effort: Pulmonary effort is normal. No respiratory distress.      Breath sounds: Normal breath sounds.   Abdominal:      General: Abdomen is flat.     Musculoskeletal:         General: No swelling or deformity.      Comments: Positive Finkelstein test bilaterally     Skin:     General: Skin is warm and dry.      Capillary Refill: Capillary refill takes less than 2 seconds.      Coloration: Skin is not jaundiced.     Neurological:      General: No focal deficit present.      Mental Status: She is alert and oriented to person, place, and time. Mental status is at baseline.     Psychiatric:         Mood and Affect: Mood normal.           Alvino Andrews M.D.  Family Medicine    Please excuse any \"sound-alike\" errors that may have ocurred during the process of dictation. Parts of this note have been dictated and there may be errors present in the transcription process. Thank you.    "

## 2025-07-12 ENCOUNTER — APPOINTMENT (OUTPATIENT)
Dept: LAB | Facility: CLINIC | Age: 40
End: 2025-07-12
Payer: COMMERCIAL

## 2025-07-12 ENCOUNTER — APPOINTMENT (OUTPATIENT)
Dept: LAB | Facility: CLINIC | Age: 40
End: 2025-07-12
Attending: PREVENTIVE MEDICINE
Payer: COMMERCIAL

## 2025-07-12 DIAGNOSIS — Z13.1 DIABETES MELLITUS SCREENING: ICD-10-CM

## 2025-07-12 DIAGNOSIS — Z86.32 HISTORY OF GESTATIONAL DIABETES MELLITUS (GDM), NOT CURRENTLY PREGNANT: ICD-10-CM

## 2025-07-12 DIAGNOSIS — R03.0 ELEVATED BLOOD PRESSURE READING: ICD-10-CM

## 2025-07-12 DIAGNOSIS — Z13.220 LIPID SCREENING: ICD-10-CM

## 2025-07-12 DIAGNOSIS — Z00.8 HEALTH EXAMINATION IN POPULATION SURVEY: ICD-10-CM

## 2025-07-12 DIAGNOSIS — Z00.00 ANNUAL PHYSICAL EXAM: ICD-10-CM

## 2025-07-12 DIAGNOSIS — O24.419 GESTATIONAL DIABETES MELLITUS (GDM) IN THIRD TRIMESTER, GESTATIONAL DIABETES METHOD OF CONTROL UNSPECIFIED: ICD-10-CM

## 2025-07-12 LAB
ALBUMIN SERPL BCG-MCNC: 4.3 G/DL (ref 3.5–5)
ALP SERPL-CCNC: 68 U/L (ref 34–104)
ALT SERPL W P-5'-P-CCNC: 20 U/L (ref 7–52)
ANION GAP SERPL CALCULATED.3IONS-SCNC: 8 MMOL/L (ref 4–13)
AST SERPL W P-5'-P-CCNC: 13 U/L (ref 13–39)
BASOPHILS # BLD AUTO: 0.05 THOUSANDS/ÂΜL (ref 0–0.1)
BASOPHILS NFR BLD AUTO: 1 % (ref 0–1)
BILIRUB SERPL-MCNC: 0.42 MG/DL (ref 0.2–1)
BUN SERPL-MCNC: 14 MG/DL (ref 5–25)
CALCIUM SERPL-MCNC: 9.3 MG/DL (ref 8.4–10.2)
CHLORIDE SERPL-SCNC: 103 MMOL/L (ref 96–108)
CHOLEST SERPL-MCNC: 169 MG/DL (ref ?–200)
CHOLEST SERPL-MCNC: 169 MG/DL (ref ?–200)
CO2 SERPL-SCNC: 25 MMOL/L (ref 21–32)
CREAT SERPL-MCNC: 0.71 MG/DL (ref 0.6–1.3)
EOSINOPHIL # BLD AUTO: 0.07 THOUSAND/ÂΜL (ref 0–0.61)
EOSINOPHIL NFR BLD AUTO: 1 % (ref 0–6)
ERYTHROCYTE [DISTWIDTH] IN BLOOD BY AUTOMATED COUNT: 12.6 % (ref 11.6–15.1)
EST. AVERAGE GLUCOSE BLD GHB EST-MCNC: 120 MG/DL
GFR SERPL CREATININE-BSD FRML MDRD: 106 ML/MIN/1.73SQ M
GLUCOSE P FAST SERPL-MCNC: 168 MG/DL (ref 65–99)
HBA1C MFR BLD: 5.8 %
HCT VFR BLD AUTO: 39.5 % (ref 34.8–46.1)
HDLC SERPL-MCNC: 51 MG/DL
HDLC SERPL-MCNC: 52 MG/DL
HGB BLD-MCNC: 12.5 G/DL (ref 11.5–15.4)
IMM GRANULOCYTES # BLD AUTO: 0.03 THOUSAND/UL (ref 0–0.2)
IMM GRANULOCYTES NFR BLD AUTO: 0 % (ref 0–2)
LDLC SERPL CALC-MCNC: 92 MG/DL (ref 0–100)
LDLC SERPL CALC-MCNC: 93 MG/DL (ref 0–100)
LYMPHOCYTES # BLD AUTO: 2.12 THOUSANDS/ÂΜL (ref 0.6–4.47)
LYMPHOCYTES NFR BLD AUTO: 23 % (ref 14–44)
MCH RBC QN AUTO: 28.1 PG (ref 26.8–34.3)
MCHC RBC AUTO-ENTMCNC: 31.6 G/DL (ref 31.4–37.4)
MCV RBC AUTO: 89 FL (ref 82–98)
MONOCYTES # BLD AUTO: 0.45 THOUSAND/ÂΜL (ref 0.17–1.22)
MONOCYTES NFR BLD AUTO: 5 % (ref 4–12)
NEUTROPHILS # BLD AUTO: 6.34 THOUSANDS/ÂΜL (ref 1.85–7.62)
NEUTS SEG NFR BLD AUTO: 70 % (ref 43–75)
NONHDLC SERPL-MCNC: 117 MG/DL
NONHDLC SERPL-MCNC: 118 MG/DL
NRBC BLD AUTO-RTO: 0 /100 WBCS
PLATELET # BLD AUTO: 446 THOUSANDS/UL (ref 149–390)
PMV BLD AUTO: 9.6 FL (ref 8.9–12.7)
POTASSIUM SERPL-SCNC: 3.7 MMOL/L (ref 3.5–5.3)
PROT SERPL-MCNC: 7.4 G/DL (ref 6.4–8.4)
RBC # BLD AUTO: 4.45 MILLION/UL (ref 3.81–5.12)
SODIUM SERPL-SCNC: 136 MMOL/L (ref 135–147)
TRIGL SERPL-MCNC: 124 MG/DL (ref ?–150)
TRIGL SERPL-MCNC: 127 MG/DL (ref ?–150)
WBC # BLD AUTO: 9.06 THOUSAND/UL (ref 4.31–10.16)

## 2025-07-12 PROCEDURE — 80053 COMPREHEN METABOLIC PANEL: CPT

## 2025-07-12 PROCEDURE — 85025 COMPLETE CBC W/AUTO DIFF WBC: CPT

## 2025-07-12 PROCEDURE — 36415 COLL VENOUS BLD VENIPUNCTURE: CPT

## 2025-07-12 PROCEDURE — 80061 LIPID PANEL: CPT

## 2025-07-12 PROCEDURE — 83036 HEMOGLOBIN GLYCOSYLATED A1C: CPT

## 2025-07-18 DIAGNOSIS — E88.819 INSULIN RESISTANCE: ICD-10-CM

## 2025-07-18 DIAGNOSIS — O92.4 HYPOGALACTIA: ICD-10-CM

## 2025-07-20 RX ORDER — METFORMIN HYDROCHLORIDE 500 MG/1
1500 TABLET, EXTENDED RELEASE ORAL
Qty: 90 TABLET | Refills: 0 | Status: SHIPPED | OUTPATIENT
Start: 2025-07-20 | End: 2025-08-19

## 2025-07-29 ENCOUNTER — OFFICE VISIT (OUTPATIENT)
Dept: FAMILY MEDICINE CLINIC | Facility: CLINIC | Age: 40
End: 2025-07-29
Payer: COMMERCIAL

## 2025-07-29 VITALS
OXYGEN SATURATION: 97 % | HEART RATE: 110 BPM | RESPIRATION RATE: 16 BRPM | TEMPERATURE: 98 F | SYSTOLIC BLOOD PRESSURE: 98 MMHG | DIASTOLIC BLOOD PRESSURE: 62 MMHG

## 2025-07-29 DIAGNOSIS — R03.0 ELEVATED BLOOD PRESSURE READING: Primary | ICD-10-CM

## 2025-07-29 DIAGNOSIS — E88.819 INSULIN RESISTANCE: ICD-10-CM

## 2025-07-29 DIAGNOSIS — O24.410 DIET CONTROLLED GESTATIONAL DIABETES MELLITUS (GDM) IN THIRD TRIMESTER: ICD-10-CM

## 2025-07-29 DIAGNOSIS — O92.4 HYPOGALACTIA: ICD-10-CM

## 2025-07-29 PROCEDURE — 99214 OFFICE O/P EST MOD 30 MIN: CPT | Performed by: FAMILY MEDICINE

## 2025-07-29 RX ORDER — NIFEDIPINE 10 MG/1
10 CAPSULE ORAL 3 TIMES DAILY
Qty: 90 CAPSULE | Refills: 0 | Status: SHIPPED | OUTPATIENT
Start: 2025-07-29

## 2025-07-29 RX ORDER — METFORMIN HYDROCHLORIDE 500 MG/1
1500 TABLET, EXTENDED RELEASE ORAL
Qty: 90 TABLET | Refills: 0 | Status: SHIPPED | OUTPATIENT
Start: 2025-07-29 | End: 2025-08-28

## 2025-08-07 ENCOUNTER — TELEPHONE (OUTPATIENT)
Age: 40
End: 2025-08-07

## 2025-08-07 ENCOUNTER — ANNUAL EXAM (OUTPATIENT)
Dept: OBGYN CLINIC | Facility: MEDICAL CENTER | Age: 40
End: 2025-08-07
Payer: COMMERCIAL

## 2025-08-07 VITALS
DIASTOLIC BLOOD PRESSURE: 70 MMHG | BODY MASS INDEX: 45.64 KG/M2 | WEIGHT: 248 LBS | HEIGHT: 62 IN | SYSTOLIC BLOOD PRESSURE: 102 MMHG

## 2025-08-07 DIAGNOSIS — R73.03 PREDIABETES: ICD-10-CM

## 2025-08-07 DIAGNOSIS — I15.8 OTHER SECONDARY HYPERTENSION: ICD-10-CM

## 2025-08-07 DIAGNOSIS — Z01.419 ENCOUNTER FOR GYNECOLOGICAL EXAMINATION (GENERAL) (ROUTINE) WITHOUT ABNORMAL FINDINGS: Primary | ICD-10-CM

## 2025-08-07 DIAGNOSIS — Z30.011 ENCOUNTER FOR PRESCRIPTION OF ORAL CONTRACEPTIVES: ICD-10-CM

## 2025-08-07 DIAGNOSIS — Z12.31 ENCOUNTER FOR SCREENING MAMMOGRAM FOR MALIGNANT NEOPLASM OF BREAST: ICD-10-CM

## 2025-08-07 DIAGNOSIS — Z31.9 DESIRE FOR PREGNANCY: ICD-10-CM

## 2025-08-07 PROBLEM — O24.410 DIET CONTROLLED GESTATIONAL DIABETES MELLITUS (GDM) IN THIRD TRIMESTER: Status: RESOLVED | Noted: 2025-03-03 | Resolved: 2025-08-07

## 2025-08-07 PROBLEM — O09.513 AMA (ADVANCED MATERNAL AGE) PRIMIGRAVIDA 35+, THIRD TRIMESTER: Status: RESOLVED | Noted: 2025-03-30 | Resolved: 2025-08-07

## 2025-08-07 PROBLEM — O99.213 SEVERE OBESITY DUE TO EXCESS CALORIES AFFECTING PREGNANCY IN THIRD TRIMESTER (HCC): Status: RESOLVED | Noted: 2025-03-30 | Resolved: 2025-08-07

## 2025-08-07 PROBLEM — E66.01 SEVERE OBESITY DUE TO EXCESS CALORIES AFFECTING PREGNANCY IN THIRD TRIMESTER (HCC): Status: RESOLVED | Noted: 2025-03-30 | Resolved: 2025-08-07

## 2025-08-07 PROBLEM — O14.10 SEVERE PREECLAMPSIA: Status: RESOLVED | Noted: 2025-04-18 | Resolved: 2025-08-07

## 2025-08-07 PROBLEM — Z98.891 S/P PRIMARY LOW TRANSVERSE C-SECTION: Status: RESOLVED | Noted: 2025-04-16 | Resolved: 2025-08-07

## 2025-08-07 PROBLEM — O09.813 PREGNANCY RESULTING FROM IN VITRO FERTILIZATION IN THIRD TRIMESTER: Status: RESOLVED | Noted: 2025-03-30 | Resolved: 2025-08-07

## 2025-08-07 PROCEDURE — S0612 ANNUAL GYNECOLOGICAL EXAMINA: HCPCS | Performed by: CLINICAL NURSE SPECIALIST

## 2025-08-07 RX ORDER — ACETAMINOPHEN AND CODEINE PHOSPHATE 120; 12 MG/5ML; MG/5ML
1 SOLUTION ORAL DAILY
Qty: 84 TABLET | Refills: 4 | Status: SHIPPED | OUTPATIENT
Start: 2025-08-07

## (undated) DEVICE — Device

## (undated) DEVICE — SUT PLAIN 2-0 CTX 27 IN 872H

## (undated) DEVICE — ANTIBACTERIAL UNDYED BRAIDED (POLYGLACTIN 910), SYNTHETIC ABSORBABLE SUTURE: Brand: COATED VICRYL

## (undated) DEVICE — SUT VICRYL 0 CT-1 27 IN J260H

## (undated) DEVICE — SUT MONOCRYL 3-0 UNDYED KS CS-1 Y523H

## (undated) DEVICE — ADHESIVE SKIN HIGH VISCOSITY EXOFIN 1ML

## (undated) DEVICE — CHLORAPREP HI-LITE 26ML ORANGE

## (undated) DEVICE — EXOFIN PRECISION PEN HIGH VISCOSITY TOPICAL SKIN ADHESIVE: Brand: EXOFIN PRECISION PEN, 1G

## (undated) DEVICE — MEDI-VAC YANKAUER SUCTION HANDLE W/STRAIGHT TIP & CONTROL VENT: Brand: CARDINAL HEALTH

## (undated) DEVICE — ADHESIVE SKN CLSR HISTOACRYL FLEX 0.5ML LF

## (undated) DEVICE — PACK C-SECTION PBDS

## (undated) DEVICE — GLOVE INDICATOR PI UNDERGLOVE SZ 7 BLUE

## (undated) DEVICE — SUT VICRYL 0 CTX 36 IN J978H

## (undated) DEVICE — SKIN MARKER DUAL TIP WITH RULER CAP, FLEXIBLE RULER AND LABELS: Brand: DEVON

## (undated) DEVICE — GLOVE SRG BIOGEL ECLIPSE 7